# Patient Record
Sex: FEMALE | Race: WHITE | NOT HISPANIC OR LATINO | Employment: OTHER | URBAN - METROPOLITAN AREA
[De-identification: names, ages, dates, MRNs, and addresses within clinical notes are randomized per-mention and may not be internally consistent; named-entity substitution may affect disease eponyms.]

---

## 2017-03-24 ENCOUNTER — HOSPITAL ENCOUNTER (OUTPATIENT)
Dept: MRI IMAGING | Facility: HOSPITAL | Age: 72
Discharge: HOME | End: 2017-03-24
Admitting: INTERNAL MEDICINE

## 2017-03-24 DIAGNOSIS — C50.812 BILATERAL MALIGNANT NEOPLASM OF OVERLAPPING SITES OF BREAST IN FEMALE (HCC): ICD-10-CM

## 2017-03-24 DIAGNOSIS — C50.811 BILATERAL MALIGNANT NEOPLASM OF OVERLAPPING SITES OF BREAST IN FEMALE (HCC): ICD-10-CM

## 2017-03-24 PROCEDURE — 0 GADOBENATE DIMEGLUMINE 529 MG/ML SOLUTION: Performed by: INTERNAL MEDICINE

## 2017-03-24 PROCEDURE — C8908 MRI W/O FOL W/CONT, BREAST,: HCPCS

## 2017-03-24 PROCEDURE — 0159T MRI BREAST BILATERAL W WO CONTRAST: CPT | Performed by: RADIOLOGY

## 2017-03-24 PROCEDURE — A9577 INJ MULTIHANCE: HCPCS | Performed by: INTERNAL MEDICINE

## 2017-03-24 PROCEDURE — 0159T HC MRI BREAST COMPUTER ANALYSIS: CPT

## 2017-03-24 PROCEDURE — 77059 MRI BREAST BILATERAL W WO CONTRAST: CPT | Performed by: RADIOLOGY

## 2017-03-24 RX ADMIN — GADOBENATE DIMEGLUMINE 15 ML: 529 INJECTION, SOLUTION INTRAVENOUS at 13:30

## 2017-03-31 ENCOUNTER — HOSPITAL ENCOUNTER (OUTPATIENT)
Dept: MAMMOGRAPHY | Facility: HOSPITAL | Age: 72
End: 2017-03-31

## 2017-03-31 ENCOUNTER — APPOINTMENT (OUTPATIENT)
Dept: MAMMOGRAPHY | Facility: HOSPITAL | Age: 72
End: 2017-03-31

## 2017-03-31 ENCOUNTER — HOSPITAL ENCOUNTER (OUTPATIENT)
Dept: ULTRASOUND IMAGING | Facility: HOSPITAL | Age: 72
End: 2017-03-31

## 2017-04-05 ENCOUNTER — APPOINTMENT (OUTPATIENT)
Dept: MAMMOGRAPHY | Facility: HOSPITAL | Age: 72
End: 2017-04-05

## 2017-04-05 ENCOUNTER — HOSPITAL ENCOUNTER (OUTPATIENT)
Dept: ULTRASOUND IMAGING | Facility: HOSPITAL | Age: 72
Discharge: HOME | End: 2017-04-05

## 2017-04-05 ENCOUNTER — HOSPITAL ENCOUNTER (OUTPATIENT)
Dept: MAMMOGRAPHY | Facility: HOSPITAL | Age: 72
Discharge: HOME | End: 2017-04-05
Admitting: INTERNAL MEDICINE

## 2017-04-05 ENCOUNTER — HOSPITAL ENCOUNTER (OUTPATIENT)
Dept: MAMMOGRAPHY | Facility: HOSPITAL | Age: 72
Discharge: HOME | End: 2017-04-05

## 2017-04-05 ENCOUNTER — HOSPITAL ENCOUNTER (OUTPATIENT)
Dept: MAMMOGRAPHY | Facility: HOSPITAL | Age: 72
End: 2017-04-05

## 2017-04-05 ENCOUNTER — APPOINTMENT (OUTPATIENT)
Dept: ULTRASOUND IMAGING | Facility: HOSPITAL | Age: 72
End: 2017-04-05

## 2017-04-05 DIAGNOSIS — C50.811 CANCER OF MIDLINE OF BREAST, RIGHT (HCC): ICD-10-CM

## 2017-04-05 DIAGNOSIS — C50.811 BILATERAL MALIGNANT NEOPLASM OF OVERLAPPING SITES OF BREAST IN FEMALE (HCC): ICD-10-CM

## 2017-04-05 DIAGNOSIS — C50.812 BILATERAL MALIGNANT NEOPLASM OF OVERLAPPING SITES OF BREAST IN FEMALE (HCC): ICD-10-CM

## 2017-04-05 PROCEDURE — G0206 DX MAMMO INCL CAD UNI: HCPCS | Performed by: RADIOLOGY

## 2017-04-05 PROCEDURE — 19083 BX BREAST 1ST LESION US IMAG: CPT | Performed by: RADIOLOGY

## 2017-04-05 PROCEDURE — 88305 TISSUE EXAM BY PATHOLOGIST: CPT | Performed by: RADIOLOGY

## 2017-04-05 PROCEDURE — G0279 TOMOSYNTHESIS, MAMMO: HCPCS

## 2017-04-05 PROCEDURE — G0206 DX MAMMO INCL CAD UNI: HCPCS

## 2017-04-05 RX ORDER — LIDOCAINE HYDROCHLORIDE 10 MG/ML
5 INJECTION, SOLUTION INFILTRATION; PERINEURAL ONCE
Status: COMPLETED | OUTPATIENT
Start: 2017-04-05 | End: 2017-04-05

## 2017-04-05 RX ORDER — LIDOCAINE HYDROCHLORIDE AND EPINEPHRINE 10; 10 MG/ML; UG/ML
10 INJECTION, SOLUTION INFILTRATION; PERINEURAL ONCE
Status: COMPLETED | OUTPATIENT
Start: 2017-04-05 | End: 2017-04-05

## 2017-04-05 RX ADMIN — LIDOCAINE HYDROCHLORIDE 5 ML: 10 INJECTION, SOLUTION INFILTRATION; PERINEURAL at 12:37

## 2017-04-05 RX ADMIN — LIDOCAINE HYDROCHLORIDE,EPINEPHRINE BITARTRATE 10 ML: 10; .01 INJECTION, SOLUTION INFILTRATION; PERINEURAL at 12:37

## 2017-04-05 NOTE — PROGRESS NOTES
Alert and orientated. Denies discomfort. No active bleeding. Steri-strips & gauze bandage applied by MENDY CassidyRT . Ice packs given. Verbalizes and demonstrates understanding of post-care instructions and written copy given.

## 2017-04-06 LAB
CYTO UR: NORMAL
LAB AP CASE REPORT: NORMAL
LAB AP CLINICAL INFORMATION: NORMAL
LAB AP DIAGNOSIS COMMENT: NORMAL
Lab: NORMAL
PATH REPORT.FINAL DX SPEC: NORMAL
PATH REPORT.GROSS SPEC: NORMAL

## 2017-04-10 ENCOUNTER — TELEPHONE (OUTPATIENT)
Dept: MAMMOGRAPHY | Facility: HOSPITAL | Age: 72
End: 2017-04-10

## 2017-04-10 NOTE — TELEPHONE ENCOUNTER
04.10.17 @ 1230: Notified pt of recommended follow-up in 6 months. She is aware of appt with Dr Ma in [place] tomorrow (04.11.17 @ [id number]).

## 2020-10-14 ENCOUNTER — HOSPITAL ENCOUNTER (INPATIENT)
Facility: HOSPITAL | Age: 75
LOS: 3 days | Discharge: HOME | End: 2020-10-17
Attending: INTERNAL MEDICINE | Admitting: INTERNAL MEDICINE

## 2020-10-14 ENCOUNTER — APPOINTMENT (OUTPATIENT)
Dept: CT IMAGING | Facility: HOSPITAL | Age: 75
End: 2020-10-14

## 2020-10-14 ENCOUNTER — APPOINTMENT (OUTPATIENT)
Dept: OTHER | Facility: HOSPITAL | Age: 75
End: 2020-10-14

## 2020-10-14 DIAGNOSIS — I63.9 CEREBROVASCULAR ACCIDENT (CVA), UNSPECIFIED MECHANISM (HCC): Primary | ICD-10-CM

## 2020-10-14 DIAGNOSIS — I48.0 PAROXYSMAL ATRIAL FIBRILLATION (HCC): ICD-10-CM

## 2020-10-14 DIAGNOSIS — Z00.6 EXAMINATION FOR NORMAL COMPARISON FOR CLINICAL RESEARCH: ICD-10-CM

## 2020-10-14 LAB
GLUCOSE BLDC GLUCOMTR-MCNC: 115 MG/DL (ref 70–130)
GLUCOSE BLDC GLUCOMTR-MCNC: 127 MG/DL (ref 70–130)

## 2020-10-14 PROCEDURE — 99222 1ST HOSP IP/OBS MODERATE 55: CPT | Performed by: NURSE PRACTITIONER

## 2020-10-14 PROCEDURE — 93005 ELECTROCARDIOGRAM TRACING: CPT | Performed by: STUDENT IN AN ORGANIZED HEALTH CARE EDUCATION/TRAINING PROGRAM

## 2020-10-14 PROCEDURE — 0 IOPAMIDOL PER 1 ML: Performed by: INTERNAL MEDICINE

## 2020-10-14 PROCEDURE — 93010 ELECTROCARDIOGRAM REPORT: CPT | Performed by: INTERNAL MEDICINE

## 2020-10-14 PROCEDURE — 70450 CT HEAD/BRAIN W/O DYE: CPT

## 2020-10-14 PROCEDURE — 0042T HC CT CEREBRAL PERFUSION W/WO CONTRAST: CPT

## 2020-10-14 PROCEDURE — 99222 1ST HOSP IP/OBS MODERATE 55: CPT | Performed by: INTERNAL MEDICINE

## 2020-10-14 PROCEDURE — 82962 GLUCOSE BLOOD TEST: CPT

## 2020-10-14 RX ORDER — LOSARTAN POTASSIUM AND HYDROCHLOROTHIAZIDE 25; 100 MG/1; MG/1
1 TABLET ORAL DAILY
Status: ON HOLD | COMMUNITY
End: 2020-12-17

## 2020-10-14 RX ORDER — MULTIPLE VITAMINS W/ MINERALS TAB 2148-113
1 TAB ORAL DAILY
COMMUNITY

## 2020-10-14 RX ORDER — ASPIRIN 81 MG/1
81 TABLET, CHEWABLE ORAL DAILY
Status: ON HOLD | COMMUNITY
End: 2020-12-17

## 2020-10-14 RX ORDER — ATORVASTATIN CALCIUM 40 MG/1
80 TABLET, FILM COATED ORAL NIGHTLY
Status: DISCONTINUED | OUTPATIENT
Start: 2020-10-14 | End: 2020-10-17 | Stop reason: HOSPADM

## 2020-10-14 RX ORDER — ANASTROZOLE 1 MG/1
1 TABLET ORAL DAILY
COMMUNITY
End: 2023-03-02

## 2020-10-14 RX ORDER — AMLODIPINE BESYLATE 10 MG/1
10 TABLET ORAL DAILY
Status: DISCONTINUED | OUTPATIENT
Start: 2020-10-14 | End: 2020-10-17 | Stop reason: HOSPADM

## 2020-10-14 RX ORDER — ASPIRIN 300 MG/1
300 SUPPOSITORY RECTAL DAILY
Status: DISCONTINUED | OUTPATIENT
Start: 2020-10-14 | End: 2020-10-17 | Stop reason: HOSPADM

## 2020-10-14 RX ORDER — SODIUM CHLORIDE 0.9 % (FLUSH) 0.9 %
10 SYRINGE (ML) INJECTION EVERY 12 HOURS SCHEDULED
Status: DISCONTINUED | OUTPATIENT
Start: 2020-10-14 | End: 2020-10-17

## 2020-10-14 RX ORDER — ASPIRIN 325 MG
325 TABLET ORAL DAILY
Status: DISCONTINUED | OUTPATIENT
Start: 2020-10-14 | End: 2020-10-17 | Stop reason: HOSPADM

## 2020-10-14 RX ORDER — BUSPIRONE HYDROCHLORIDE 5 MG/1
5 TABLET ORAL 2 TIMES DAILY
COMMUNITY

## 2020-10-14 RX ORDER — AMLODIPINE BESYLATE 10 MG/1
10 TABLET ORAL DAILY
COMMUNITY
End: 2022-02-28 | Stop reason: SDUPTHER

## 2020-10-14 RX ORDER — GEMFIBROZIL 600 MG/1
600 TABLET, FILM COATED ORAL
COMMUNITY
End: 2020-11-09

## 2020-10-14 RX ORDER — SODIUM CHLORIDE 0.9 % (FLUSH) 0.9 %
10 SYRINGE (ML) INJECTION AS NEEDED
Status: DISCONTINUED | OUTPATIENT
Start: 2020-10-14 | End: 2020-10-17 | Stop reason: HOSPADM

## 2020-10-14 RX ORDER — CLOPIDOGREL BISULFATE 75 MG/1
75 TABLET ORAL DAILY
Status: DISCONTINUED | OUTPATIENT
Start: 2020-10-14 | End: 2020-10-17 | Stop reason: HOSPADM

## 2020-10-14 RX ORDER — OMEPRAZOLE 40 MG/1
40 CAPSULE, DELAYED RELEASE ORAL DAILY
COMMUNITY
End: 2020-11-09

## 2020-10-14 RX ADMIN — ATORVASTATIN CALCIUM 80 MG: 40 TABLET, FILM COATED ORAL at 22:32

## 2020-10-14 RX ADMIN — AMLODIPINE BESYLATE 10 MG: 10 TABLET ORAL at 18:46

## 2020-10-14 RX ADMIN — IOPAMIDOL 40 ML: 755 INJECTION, SOLUTION INTRAVENOUS at 17:30

## 2020-10-14 RX ADMIN — SODIUM CHLORIDE, PRESERVATIVE FREE 10 ML: 5 INJECTION INTRAVENOUS at 22:32

## 2020-10-14 RX ADMIN — ASPIRIN 325 MG ORAL TABLET 325 MG: 325 PILL ORAL at 17:42

## 2020-10-14 RX ADMIN — CLOPIDOGREL BISULFATE 75 MG: 75 TABLET ORAL at 17:42

## 2020-10-14 NOTE — NURSING NOTE
ACC REVIEW REPORT: [facility]        PATIENT NAME: Yumi Crooks    PATIENT ID: [id number]      COVID-19 ACC SCREENING       DOES THE PATIENT HAVE A FEVER GREATER THAN OR EQUAL .4: DENIES    IS THE PATIENT EXPERIENCING SHORTNESS OF BREATH: DENIES    DOES THE PATIENT HAVE A COUGH: DENIES    DOES THE PATIENT HAVE ANY OF THE FOLLOWING RISK FACTORS:    EXPOSURE TO SUSPECTED OR KNOWN COVID-19: DENIES    RECENT TRAVEL HISTORY TO ENDEMIC AREA (DOMESTIC/LOCAL): DENIES    IS THE PATIENT A HEALTHCARE WORKER: DENIES    HAS THE PATIENT BEEN TESTED FOR COVID-19: YES, PENDING    DATE TESTED: 10/13    LAB TESTING SENT TO: UNKNOWN      BED: S304    BED TYPE: TELE    BED GIVEN TO:     TIME BED GIVEN: 1347    YOB: 1945    AGE: 76YO    GENDER: F    PREVIOUS ADMIT TO [facility]:     PREVIOUS ADMISSION DATE:     PATIENT CLASS: INPATIENT    TODAY'S DATE: 10/14/2020    TRANSFER DATE: 10/14/2020    ETA: 1430    TRANSFERRING FACILITY: [facility]    TRANSFERRING FACILITY PHONE # : [phone]    TRANSFERRING MD: CHI    ACCEPTING PROVIDER: MD POWERS    NEUROLOGY PHYSICIAN:     DATE/TIME REQUEST RECEIVED:     [facility] RN: REBA HINKLE    REPORT FROM: BRANDEN VALVERDE    TIME REPORT TAKEN: 1540    DIAGNOSIS: STROKE    REASON FOR TRANSFER TO [facility]: HIGHER LEVEL OF CARE    TRANSPORTATION: AMBULANCE    CLINICAL REASON FOR TRANSFER TO [facility]: PATIENT HAS HAD INCREASING RIGHT ARM TINGLING, WEAKNESS, AND NUMBNESS FOR THE PAST MONTH. PATIENT BEGAN TO DISPLAY SIGNS OF STROKE LAST Thursday, LAST KNOWN WELL MOST LIKELY 2000 10/13.  PATIENT WAS TRANSPORTED VIA EMS TO ED WITH DIAGNOSIS OF ACUTE STROKE AND UTI.     CLINICAL INFORMATION    HEIGHT: UNKNOWN PER RN    WEIGHT: 172.2LBS    ALLERGIES: SULFA, AMOXICILLIN, OXYCODONE    PIMENTEL: NONE    INFECTIOUS DISEASE: NONE    ISOLATION: NONE    LAST VITAL SIGNS:  TIME: 1200  TEMP: 98.4  PULSE: 60   B/P: 153/83  RESP: 16    LAB INFORMATION: 10/13 2300 - WBC 6.0, HBG 14.0, HCT 41.3, , MG 2.1, , K 3.8,  CREATININE 1.0, GLUCOSE 112     CULTURE INFORMATION: NONE    MEDS/IV FLUIDS: 20' LEFT AC, SALINE LOCKED      CARDIAC SYSTEM:    CHEST PAIN: NONE    RATE: NONE    SCALE: NONE    RHYTHM: SINUS BRADYCARDIA TO NORMAL SINUS RHYTHM    Is patient taking or has patient been given any drugs that could increase bleeding? YES  (Plavix, Brilinta, Effient, Eliquis, Xarelto, Warfarin, Integrilin, Angiomax)    DRUG: PLAVIX, ELIQUIS     DOSE/FREQUENCY: 75MG PLAVIX DAILY, 5 MG ELIQUIS DAILY    CARDIAC ENZYMES: NOT DONE    CARDIAC NOTES: 10/13/20 EKG NORMAL SINUS RHYTHM.      RESPIRATORY SYSTEM:    LUNG SOUNDS: PRESENT    CLEAR: YES    OXYGEN: ROOM AIR    O2 SAT: 97%    RESPIRATORY STATUS: PER RN, PATIENT IS NOT LABORED, O2 SAT GREATER THAN 95%.    CNS/MUSCULOSKELETAL      MARCEL COMA SCALE:    E: 4  M: 6  V: 5    LAST KNOWN WELL: 10/14/20      NIHSS    Survey Item  0: Means Alert  1: Drowsy or Answer Correctly  2: Incorrect, Forced, Can't Resist Gravity  3: Complete or No Effort  4: No Movement  NT: Not Testable Acceptable As Noted Above    1A: Level of Consciousness: 0    1B: LOC Questions (month, age) : 0    1C: LOC Commands (open/close eyes, make a fist & let go): 0    2:  Best Gaze (eyes open-pt follows examiner's fingers or face): 0    3:  Visual (introduce visual stimulus/threat to pt's visual field quad. Cover 1 eye and hold up fingers in all 4 quadrants) : 0    4.  Facial Palsy (show teeth, raise eyebrows and squeeze eyes tightly shut): 0    5A: Motor Arm-Left (elevate extremity to 90 degrees and score drift/movement.  Count to 10 aloud and use fingers for visual cue): 0    5B:  Motor Arm-Right (elevate extremity to 90 degrees and score drift/movement.  Count to 10 aloud and use fingers for visual cue): 0    6A:  Motor Leg-Left (elevate extremity to 30 degrees and score drift/movement.  Count to 5 out loud and use fingers for visual cue): 0    6B:  Motor Leg-Right (elevate extremity to 30 degrees and score drift/movement.   Count to 5 out loud and use fingers for visual cue): 0    7:  Limb Ataxia- finger to nose, heel down shin: 0    8:  Sensory- pin prick to face, arms, trunk, and legs. Compare sharpness side to side: 0    9:  Best Language- name, items, describe picture, and read sentences.  Do not forget glasses if they normally wear them: 0    10: Dysarthria- elevate speech clarity by pt reading or repeating words on a list: 0    11: Extinction and Inattention- Use information from prior testing or double simultaneous stimuli testing to identify neglect. Face, arms, legs and visual field: 0    Total NIHSS Score: 0  Date: 10/14/2020   Time of NIHSS Assessment: 0800  PER RN, PATIENT DOES NOT DISPLAY ANY DEFICITS. RN IS UNABLE TO ACCESS PREVIOUS NIHSS ASSESSMENT.      SIZE OF HEMORRHAGE: UNKNOWN PER RN    CAT SCAN RESULTS: 10/14 CT OF HEAD SHOWS MULTIPLE MINOR BLOCKAGES, MULTIPLE ACUTE INFARCTS, 1 LARGE CEREBRAL POSTERIOR OCCLUSION OF CONCERN (LEFT POSTERIOR CEREBELLAR ARTERY).    MRI RESULTS: 10/14 PENDING, RESULTS UNKNOWN PER RN    CNS/MUSCULOSKELETAL NOTES:  10/14/20 BILATERAL LOWER EXTREMITY DOPPLER SCAN, RESULTS PENDING. 10/13/20 CTA HEAD AND NECK, RESULTS PENDING. RESULTS UNKNOWN PER RN.    GI//GY    ABDOMINAL PAIN: NONE    VOMITING: NONE    DIARRHEA: 10/14/20 - LIQUID STOOL, MOST LIKELY DUE TO CONTRAST    NAUSEA: NONE    BOWEL SOUNDS: PRESENT    OCCULT STOOL: NONE    VAGINAL BLEEDING: NONE    HEMATURIA: NONE    NG TUBE:    SIZE:   DATE INSERTED:       ULTRASOUND:     ULTRASOUND RESULTS:       ACUTE ABDOMEN:     ACUTE ABDOMEN RESULTS:       CT SCAN:     CT SCAN RESULTS:       GI//GY NOTES:     PAST MEDICAL HISTORY: HTN, HIGH CHOLESTEROL, MINI STROKES, GERD, BREAST CANCER    OTHER SYMPTOM NOTES: PATIENT COMPLAINTS OF BILATERAL LOWER EXTREMITY TINGLING 10/14 AM, RESULTING IN DUPLEX SCAN, PATIENT HAS HAD NO COMPLAINTS SINCE THEN.    ADDITIONAL NOTES: PER RN, PATIENT IS UP AD YARIEL, VITALS ARE STABLE.     PER DR. MIRELES DURING  CONVERSATION WITH STROKE NAVIGATOR, PATIENT HAS RIGHT SIDED WEAKNESS, MINOR HEMINOPSIA OF THE RIGHT EYE, AND SOME APHASIA (NIHSS 5).      Jamila Bishop RN  10/14/2020  13:52 EDT

## 2020-10-14 NOTE — CONSULTS
"Stroke Consult Note    Patient Name: Yumi Crooks   MRN: [id number]  Age: 75 y.o.  Sex: female  : 1945    Primary Care Physician: Kya Huerta MD  Referring Physician:  Leonila Osborn MD    TIME STROKE TEAM CALLED: 1557 EST     TIME PATIENT SEEN: 1605 EST    Handedness: Right  Race: white    Chief Complaint/Reason for Consultation: right arm numbness    HPI: Mrs Crooks is a 75 year old white, right handed female with known diagnosis of breast cancer (s/p mastectomy, currently on arimidex),HTN, HLD and anxiety that started having right arm numbness approximately one month ago.  She has been going to the chiropractor because she thought it was \"pinched nerve\". Has also had intermittent posterior headache, 2/10, with associated nausea and blurred vision. Has been to her opthamologist and got new glasses which did not help blurred vision.     Right arm numbness worsened in the past week prompting her to go to OSH for evaluation yesterday.  CTA at their facility revealed and reported occluded right vertebral artery and left PCA occlusion, thus she was transferred to our facility for further evaluation.     She is on ASA 81mg daily at home. Initial /104    Last Known Normal Date/Time: one month ago     Review of Systems   Constitutional: Negative for fever.   HENT: Negative for trouble swallowing.    Eyes: Negative for photophobia and visual disturbance.   Respiratory: Negative for cough and shortness of breath.    Cardiovascular: Negative for chest pain and palpitations.   Gastrointestinal: Positive for nausea. Negative for vomiting.   Endocrine: Positive for cold intolerance and heat intolerance.   Genitourinary: Positive for frequency.   Musculoskeletal: Positive for arthralgias.   Skin: Negative.    Allergic/Immunologic: Negative.    Neurological: Positive for dizziness, numbness and headaches. Negative for tremors, seizures, syncope, facial asymmetry, speech difficulty, weakness and " light-headedness.   Hematological: Negative.    Psychiatric/Behavioral: Negative.         Temp:  [97.8 °F (36.6 °C)] 97.8 °F (36.6 °C)  Heart Rate:  [64] 64  Resp:  [18] 18  BP: (195)/(104) 195/104    Neurological Exam  Mental Status  Awake, alert and oriented to person, place and time.Alert. Recent and remote memory are intact. Speech is normal. Language is fluent with no aphasia. Attention and concentration are normal. Fund of knowledge is appropriate for level of education.    Cranial Nerves  CN II: Left visual acuity: normal. Right homonymous hemianopsia.  CN III, IV, VI: Extraocular movements intact bilaterally.  CN V: Facial sensation is normal.  CN VII: Full and symmetric facial movement.  CN IX, X: Palate elevates symmetrically  CN XI: Shoulder shrug strength is normal.  CN XII: Tongue midline without atrophy or fasciculations.    Motor  Normal muscle bulk throughout. No fasciculations present. Strength is 5/5 throughout all four extremities.    Sensory  Light touch is normal in upper and lower extremities.     Coordination  Finger-to-nose, rapid alternating movements and heel-to-shin normal bilaterally without dysmetria.    Gait  Normal casual, toe, heel and tandem gait.      Physical Exam  Vitals signs and nursing note reviewed.   Constitutional:       Appearance: Normal appearance.   HENT:      Head: Normocephalic and atraumatic.      Mouth/Throat:      Mouth: Mucous membranes are moist.   Eyes:      Extraocular Movements: Extraocular movements intact.   Cardiovascular:      Rate and Rhythm: Normal rate and regular rhythm.   Abdominal:      General: Abdomen is flat.   Skin:     General: Skin is warm and dry.   Neurological:      Mental Status: She is alert.      Coordination: Coordination is intact.      Deep Tendon Reflexes: Strength normal.   Psychiatric:         Mood and Affect: Mood normal.         Speech: Speech normal.         Behavior: Behavior normal.         Acute Stroke Data    Alteplase (tPA)  Inclusion / Exclusion Criteria    Time: 1600  Person Administering Scale: DIONY Sofia    Inclusion Criteria  [x]   18 years of age or greater   []   Onset of symptoms < 4.5 hours before beginning treatment (stroke onset = time patient was last seen well or without symptoms).   []   Diagnosis of acute ischemic stroke causing measurable disabling deficit (Complete Hemianopia, Any Aphasia, Visual or Sensory Extinction, Any weakness limiting sustained effort against gravity)   []   Any remaining deficit considered potentially disabling in view of patient and practitioner   Exclusion criteria (Do not proceed with Alteplase if any are checked under exclusion criteria)  [x]   Onset unknown or GREATER than 4.5 hours   []   ICH on CT/MRI   []   CT demonstrates hypodensity representing acute or subacute infarct   []   Significant head trauma or prior stroke in the previous 3 months   []   Symptoms suggestive of subarachnoid hemorrhage   []   History of un-ruptured intracranial aneurysm GREATER than 10 mm   []   Recent intracranial or intraspinal surgery within the last 3 months   []   Arterial puncture at a non-compressible site in the previous 7 days   []   Active internal bleeding   []   Acute bleeding tendency   []   Platelet count LESS than 100,000 for known hematological diseases such as leukemia, thrombocytopenia or chronic cirrhosis   []   Current use of anticoagulant with INR GREATER than 1.7 or PT GREATER than 15 seconds, aPTT GREATER than 40 seconds   []   Heparin received within 48 hours, resulting in abnormally elevated aPTT GREATER than upper limit of normal   []   Current use of direct thrombin inhibitors or direct factor Xa inhibitors in the past 48 hours   []   Elevated blood pressure refractory to treatment (systolic GREATER than 185 mm/Hg or diastolic  GREATER than 110 mm/Hg   []   Suspected infective endocarditis and aortic arch dissection   []   Current use of therapeutic treatment dose of  low-molecular-weight heparin (LMWH) within the previous 24 hours   []   Structural GI malignancy or bleed   Relative exclusion for all patients  []   Only minor non-disabling symptoms   []   Pregnancy   []   Seizure at onset with postictal residual neurological impairments   []   Major surgery or previous trauma within past 14 days   []   History of previous spontaneous ICH, intracranial neoplasm, or AV malformation   []   Postpartum (within previous 14 days)   []   Recent GI or urinary tract hemorrhage (within previous 21 days)   []   Recent acute MI (within previous 3 months)   []   History of un-ruptured intracranial aneurysm LESS than 10 mm   []   History of ruptured intracranial aneurysm   []   Blood glucose LESS than 50 mg/dL (2.7 mmol/L)   []   Dural puncture within the last 7 days   []   Known GREATER than 10 cerebral microbleeds   Additional exclusions for patients with symptoms onset between 3 and 4.5 hours.  []   Age > 80.   []   On any anticoagulants regardless of INR  >>> Warfarin (Coumadin), Heparin, Enoxaparin (Lovenox), fondaparinux (Arixtra), bivalirudin (Angiomax), Argatroban, dabigatran (Pradaxa), rivaroxaban (Xarelto), or apixaban (Eliquis)   []   Severe stroke (NIHSS > 25).   []   History of BOTH diabetes and previous ischemic stroke.   []   The risks and benefits have been discussed with the patient or family related to the administration of IV Alteplase for stroke symptoms.   []   I have discussed and reviewed the patient's case and imaging with the attending prior to IV Alteplase.   Not administered Time Alteplase administered       History reviewed. No pertinent past medical history.  Past Surgical History:   Procedure Laterality Date   • BREAST BIOPSY Right     2015     Family History   Problem Relation Age of Onset   • Breast cancer Neg Hx    • Ovarian cancer Neg Hx      Social History     Socioeconomic History   • Marital status:      Spouse name: Not on file   • Number of  children: Not on file   • Years of education: Not on file   • Highest education level: Not on file   Tobacco Use   • Smoking status: Never Smoker   • Smokeless tobacco: Never Used     No Known Allergies  Prior to Admission medications    Not on File       Hospital Meds:  Scheduled- [COMPLETED] iopamidol, 50 mL, Intravenous, Once in imaging      Infusions-     PRNs-     Functional Status Prior to Current Stroke/[place] Score: 0    NIH Stroke Scale  Time: 1600  Person Administering Scale: DIONY Sofia    1a  Level of consciousness: 0=alert; keenly responsive   1b. LOC questions:  0=Performs both tasks correctly   1c. LOC commands: 0=Performs both tasks correctly   2.  Best Gaze: 0=normal   3.  Visual: 1=Partial hemianopia   4. Facial Palsy: 0=Normal symmetric movement   5a.  Motor left arm: 0=No drift, limb holds 90 (or 45) degrees for full 10 seconds   5b.  Motor right arm: 0=No drift, limb holds 90 (or 45) degrees for full 10 seconds   6a. motor left le=No drift, limb holds 90 (or 45) degrees for full 10 seconds   6b  Motor right le=No drift, limb holds 90 (or 45) degrees for full 10 seconds   7. Limb Ataxia: 0=Absent   8.  Sensory: 0=Normal; no sensory loss   9. Best Language:  0=No aphasia, normal   10. Dysarthria: 0=Normal   11. Extinction and Inattention: 0=No abnormality    Total:   1       Results Reviewed:  I have personally reviewed current lab, radiology, and data and agree with results.  Lab Results (last 24 hours)     ** No results found for the last 24 hours. **        Imaging Results (Last 24 Hours)     Procedure Component Value Units Date/Time    CT Cerebral Perfusion With & Without Contrast [[id number]] Collected: 10/14/20 1643     Updated: 10/14/20 1654    Narrative:      EXAMINATION: CT CEREBRAL PERFUSION W WO CONTRAST-10/14/2020:      INDICATION: Transient ischemic attack (TIA); Z00.6-Encounter for  examination for normal comparison and control in clinical research  program.      TECHNIQUE: Cerebral perfusion analysis was performed using computed  tomography with IV contrast administration, including postprocessing of  parametric maps with determination of cerebral blood flow, cerebral  blood volume, mean transit time and time to drain.     The radiation dose reduction device was turned on for each scan per the  ALARA (As Low as Reasonably Achievable) protocol.     COMPARISON: Unenhanced CT scan of same day.     FINDINGS: Rapid analysis shows no areas of the brain with cerebral blood  flow less than 30% or T-Max greater than six seconds. Individual  perfusion maps, however, show a roughly 7 x 3 cm area of increased time  to drain, mean transit time and T-Max in the left medial parietal lobe,  perhaps extending into the occipital lobe. There is mild, if any  asymmetry on cerebral blood flow images, and cerebral blood volume  images appear appear essentially normal. This suggests possibility of  left posterior cerebral artery territory slow flow, without apparent  ischemia or infarct.       Impression:      1. Negative rapid study for significant ischemia or infarct.  2. Individual perfusion maps suggest slow flow centered in the medial  left parietal lobe, perhaps in the posterior left MCA/PCA watershed  territory.     D:  10/14/2020  E:  10/14/2020             CT Head Without Contrast [[id number]] Collected: 10/14/20 1636     Updated: 10/14/20 1644    Narrative:      EXAMINATION: CT HEAD WO CONTRAST-      INDICATION: stroke; Z00.6-Encounter for examination for normal  comparison and control in clinical research program     TECHNIQUE: 5 mm unenhanced images through the brain     The radiation dose reduction device was turned on for each scan per the  ALARA (As Low as Reasonably Achievable) protocol.     COMPARISON: NONE     FINDINGS: Patient history indicates stroke, right arm numbness.     The calvarium appears intact. Included paranasal sinuses and mastoids  appear clear. Soft tissue window  images show expected degree of  generalized cervical atrophy for age. There is extensive central white  matter change, typical of microvascular leukoencephalopathy.  Low-attenuation changes in both right and left basal ganglia may  represent chronic ischemic foci. There is a sharply defined CSF density  2.2 cm lesion of the medial left temporal lobe is in the usual location  of a normal variant dilated perivascular space, although larger than  typically seen, possibly an arachnoid cyst, but likely benign in any  event. No similar findings are identified elsewhere. There is no  apparent well-defined edema/infarct, no evidence of hemorrhage,  hydrocephalus, soft tissue mass or significant mass effect, or abnormal  extra-axial collection.                   Impression:      1. Extensive central white matter changes, likely representing chronic  small vessel disease. Punctate low-density changes of the right and left  basal ganglia, favored to represent small old ischemic lesions.  2. Probable 2.2 cm arachnoid cyst of the medial left temporal lobe, as  noted above.  3. No evidence of intracranial hemorrhage, well-defined infarct, or  other clearly acute intracranial abnormality.              Note: Exam time is shown as 4:23 PM. Study was reviewed dictated and  signed at 4:40 PM.     This report was finalized on 10/14/2020 4:41 PM by Dr. Nabeel Kendrick MD.       MRI Outside Films [[id number]] Resulted: 10/14/20 1632     Updated: 10/14/20 1632    Narrative:      This procedure was auto-finalized with no dictation required.    CT Outside Films [[id number]] Resulted: 10/14/20 1630     Updated: 10/14/20 1630    Narrative:      This procedure was auto-finalized with no dictation required.    CT Outside Films [[id number]] Resulted: 10/14/20 1629     Updated: 10/14/20 1629    Narrative:      This procedure was auto-finalized with no dictation required.             Assessment/Plan:   Mrs Crooks is a 75 year old white, right handed  "female with known diagnosis of breast cancer (s/p mastectomy, currently on arimidex),HTN, HLD and anxiety that started having right arm numbness approximately one month ago.  She has been going to the chiropractor because she thought it was \"pinched nerve\". Has also had intermittent posterior headache, 2/10, with associated nausea and blurred vision. Has been to her opthamologist and got new glasses which did not help blurred vision.     Right arm numbness worsened in the past week prompting her to go to OSH for evaluation yesterday.  CTA at their facility reported occluded right vertebral artery and left PCA occlusion, and MRI revealed multiple small infarcts of left splenium of corpus collosum and medial left occipital, thus she was transferred to our facility for further evaluation.     She is on ASA 81mg daily at home. Initial /104          1. Acute CVA  -CT head negative for acute changes  -CTA head from OSH reviewed left PCA occlusion, right vertebral occlusion  -MRI from OSH reviewed multiple small infarcts left splenium of corpus collosum and medial left occipital infarct  -Initiate TIA/AIS order set  -Echo performed at OSH, will not repeat at this time  -ASA 325mg daily  -Plavix 75mg daily  -Lipitor 80mg qhs  -a1c in am  -lipid panel in am    2.  HTN  -slowly normalize BP  -will start only amlodipine today, hold Hyzar    3.  HLD  -lipid panel in am  -start Lipitor 80mg qhs    Thank you for the consult. Case discussed with Dr Jim and imaging reviewed. Also discussed with patient and nursing staff.            Shanna Mcconnell, APRN  October 14, 2020  17:22 EDT      "

## 2020-10-15 ENCOUNTER — APPOINTMENT (OUTPATIENT)
Dept: OTHER | Facility: HOSPITAL | Age: 75
End: 2020-10-15

## 2020-10-15 PROBLEM — E78.5 HLD (HYPERLIPIDEMIA): Status: ACTIVE | Noted: 2020-10-15

## 2020-10-15 PROBLEM — I10 HTN (HYPERTENSION): Status: ACTIVE | Noted: 2020-10-15

## 2020-10-15 LAB
ALBUMIN SERPL-MCNC: 4.1 G/DL (ref 3.5–5.2)
ALBUMIN/GLOB SERPL: 1.6 G/DL
ALP SERPL-CCNC: 112 U/L (ref 39–117)
ALT SERPL W P-5'-P-CCNC: 21 U/L (ref 1–33)
ANION GAP SERPL CALCULATED.3IONS-SCNC: 12 MMOL/L (ref 5–15)
AST SERPL-CCNC: 25 U/L (ref 1–32)
BASOPHILS # BLD AUTO: 0.09 10*3/MM3 (ref 0–0.2)
BASOPHILS NFR BLD AUTO: 1.1 % (ref 0–1.5)
BILIRUB SERPL-MCNC: 0.2 MG/DL (ref 0–1.2)
BUN SERPL-MCNC: 21 MG/DL (ref 8–23)
BUN/CREAT SERPL: 23.9 (ref 7–25)
CALCIUM SPEC-SCNC: 9.6 MG/DL (ref 8.6–10.5)
CHLORIDE SERPL-SCNC: 108 MMOL/L (ref 98–107)
CHOLEST SERPL-MCNC: 203 MG/DL (ref 0–200)
CO2 SERPL-SCNC: 21 MMOL/L (ref 22–29)
CREAT SERPL-MCNC: 0.88 MG/DL (ref 0.57–1)
DEPRECATED RDW RBC AUTO: 44.2 FL (ref 37–54)
EOSINOPHIL # BLD AUTO: 0.33 10*3/MM3 (ref 0–0.4)
EOSINOPHIL NFR BLD AUTO: 4.1 % (ref 0.3–6.2)
ERYTHROCYTE [DISTWIDTH] IN BLOOD BY AUTOMATED COUNT: 12.3 % (ref 12.3–15.4)
GFR SERPL CREATININE-BSD FRML MDRD: 63 ML/MIN/1.73
GLOBULIN UR ELPH-MCNC: 2.5 GM/DL
GLUCOSE BLDC GLUCOMTR-MCNC: 112 MG/DL (ref 70–130)
GLUCOSE BLDC GLUCOMTR-MCNC: 168 MG/DL (ref 70–130)
GLUCOSE SERPL-MCNC: 106 MG/DL (ref 65–99)
HBA1C MFR BLD: 5.7 % (ref 4.8–5.6)
HCT VFR BLD AUTO: 40.3 % (ref 34–46.6)
HDLC SERPL-MCNC: 30 MG/DL (ref 40–60)
HGB BLD-MCNC: 13.3 G/DL (ref 12–15.9)
IMM GRANULOCYTES # BLD AUTO: 0.02 10*3/MM3 (ref 0–0.05)
IMM GRANULOCYTES NFR BLD AUTO: 0.2 % (ref 0–0.5)
INR PPP: 1.09 (ref 0.85–1.16)
LDLC SERPL CALC-MCNC: 143 MG/DL (ref 0–100)
LDLC/HDLC SERPL: 4.68 {RATIO}
LYMPHOCYTES # BLD AUTO: 3.1 10*3/MM3 (ref 0.7–3.1)
LYMPHOCYTES NFR BLD AUTO: 38.4 % (ref 19.6–45.3)
MCH RBC QN AUTO: 32.1 PG (ref 26.6–33)
MCHC RBC AUTO-ENTMCNC: 33 G/DL (ref 31.5–35.7)
MCV RBC AUTO: 97.3 FL (ref 79–97)
MONOCYTES # BLD AUTO: 0.68 10*3/MM3 (ref 0.1–0.9)
MONOCYTES NFR BLD AUTO: 8.4 % (ref 5–12)
NEUTROPHILS NFR BLD AUTO: 3.85 10*3/MM3 (ref 1.7–7)
NEUTROPHILS NFR BLD AUTO: 47.8 % (ref 42.7–76)
NRBC BLD AUTO-RTO: 0 /100 WBC (ref 0–0.2)
PLATELET # BLD AUTO: 267 10*3/MM3 (ref 140–450)
PMV BLD AUTO: 10.9 FL (ref 6–12)
POTASSIUM SERPL-SCNC: 3.8 MMOL/L (ref 3.5–5.2)
PROT SERPL-MCNC: 6.6 G/DL (ref 6–8.5)
PROTHROMBIN TIME: 13.9 SECONDS (ref 11.5–14)
RBC # BLD AUTO: 4.14 10*6/MM3 (ref 3.77–5.28)
SARS-COV-2 RDRP RESP QL NAA+PROBE: NOT DETECTED
SODIUM SERPL-SCNC: 141 MMOL/L (ref 136–145)
TRIGL SERPL-MCNC: 163 MG/DL (ref 0–150)
VLDLC SERPL-MCNC: 30 MG/DL (ref 5–40)
WBC # BLD AUTO: 8.07 10*3/MM3 (ref 3.4–10.8)

## 2020-10-15 PROCEDURE — 80061 LIPID PANEL: CPT | Performed by: STUDENT IN AN ORGANIZED HEALTH CARE EDUCATION/TRAINING PROGRAM

## 2020-10-15 PROCEDURE — 92523 SPEECH SOUND LANG COMPREHEN: CPT

## 2020-10-15 PROCEDURE — 97165 OT EVAL LOW COMPLEX 30 MIN: CPT

## 2020-10-15 PROCEDURE — 82962 GLUCOSE BLOOD TEST: CPT

## 2020-10-15 PROCEDURE — 97161 PT EVAL LOW COMPLEX 20 MIN: CPT

## 2020-10-15 PROCEDURE — 85025 COMPLETE CBC W/AUTO DIFF WBC: CPT | Performed by: INTERNAL MEDICINE

## 2020-10-15 PROCEDURE — 80053 COMPREHEN METABOLIC PANEL: CPT | Performed by: INTERNAL MEDICINE

## 2020-10-15 PROCEDURE — 85610 PROTHROMBIN TIME: CPT | Performed by: INTERNAL MEDICINE

## 2020-10-15 PROCEDURE — 83036 HEMOGLOBIN GLYCOSYLATED A1C: CPT | Performed by: STUDENT IN AN ORGANIZED HEALTH CARE EDUCATION/TRAINING PROGRAM

## 2020-10-15 PROCEDURE — 99233 SBSQ HOSP IP/OBS HIGH 50: CPT | Performed by: STUDENT IN AN ORGANIZED HEALTH CARE EDUCATION/TRAINING PROGRAM

## 2020-10-15 PROCEDURE — 87635 SARS-COV-2 COVID-19 AMP PRB: CPT | Performed by: INTERNAL MEDICINE

## 2020-10-15 RX ORDER — LOSARTAN POTASSIUM 50 MG/1
100 TABLET ORAL
Status: DISCONTINUED | OUTPATIENT
Start: 2020-10-15 | End: 2020-10-17 | Stop reason: HOSPADM

## 2020-10-15 RX ORDER — SODIUM CHLORIDE 9 MG/ML
75 INJECTION, SOLUTION INTRAVENOUS CONTINUOUS
Status: DISCONTINUED | OUTPATIENT
Start: 2020-10-15 | End: 2020-10-16

## 2020-10-15 RX ORDER — BUSPIRONE HYDROCHLORIDE 10 MG/1
5 TABLET ORAL DAILY
Status: DISCONTINUED | OUTPATIENT
Start: 2020-10-15 | End: 2020-10-17 | Stop reason: HOSPADM

## 2020-10-15 RX ORDER — SODIUM CHLORIDE 0.9 % (FLUSH) 0.9 %
10 SYRINGE (ML) INJECTION EVERY 12 HOURS SCHEDULED
Status: DISCONTINUED | OUTPATIENT
Start: 2020-10-15 | End: 2020-10-17 | Stop reason: HOSPADM

## 2020-10-15 RX ORDER — PANTOPRAZOLE SODIUM 40 MG/1
40 TABLET, DELAYED RELEASE ORAL EVERY MORNING
Status: DISCONTINUED | OUTPATIENT
Start: 2020-10-15 | End: 2020-10-17 | Stop reason: HOSPADM

## 2020-10-15 RX ORDER — SODIUM CHLORIDE 0.9 % (FLUSH) 0.9 %
10 SYRINGE (ML) INJECTION AS NEEDED
Status: DISCONTINUED | OUTPATIENT
Start: 2020-10-15 | End: 2020-10-17 | Stop reason: HOSPADM

## 2020-10-15 RX ORDER — ANASTROZOLE 1 MG/1
1 TABLET ORAL DAILY
Status: DISCONTINUED | OUTPATIENT
Start: 2020-10-15 | End: 2020-10-17 | Stop reason: HOSPADM

## 2020-10-15 RX ADMIN — PANTOPRAZOLE SODIUM 40 MG: 40 TABLET, DELAYED RELEASE ORAL at 06:48

## 2020-10-15 RX ADMIN — AMLODIPINE BESYLATE 10 MG: 10 TABLET ORAL at 09:55

## 2020-10-15 RX ADMIN — ANASTROZOLE 1 MG: 1 TABLET, COATED ORAL at 09:56

## 2020-10-15 RX ADMIN — ATORVASTATIN CALCIUM 80 MG: 40 TABLET, FILM COATED ORAL at 22:41

## 2020-10-15 RX ADMIN — SODIUM CHLORIDE 500 ML: 9 INJECTION, SOLUTION INTRAVENOUS at 17:16

## 2020-10-15 RX ADMIN — BUSPIRONE HYDROCHLORIDE 5 MG: 10 TABLET ORAL at 09:55

## 2020-10-15 RX ADMIN — CLOPIDOGREL BISULFATE 75 MG: 75 TABLET ORAL at 09:56

## 2020-10-15 RX ADMIN — ASPIRIN 325 MG ORAL TABLET 325 MG: 325 PILL ORAL at 09:55

## 2020-10-15 RX ADMIN — LOSARTAN POTASSIUM 100 MG: 50 TABLET, FILM COATED ORAL at 09:55

## 2020-10-15 RX ADMIN — SODIUM CHLORIDE, PRESERVATIVE FREE 10 ML: 5 INJECTION INTRAVENOUS at 22:41

## 2020-10-15 RX ADMIN — SODIUM CHLORIDE, PRESERVATIVE FREE 10 ML: 5 INJECTION INTRAVENOUS at 22:42

## 2020-10-15 RX ADMIN — SODIUM CHLORIDE 75 ML/HR: 9 INJECTION, SOLUTION INTRAVENOUS at 17:16

## 2020-10-15 NOTE — PLAN OF CARE
Goal Outcome Evaluation:  Pt. A&Ox4, VSS, NS-SB per monitor. RA while awake, 2L NC while sleeping to keep O2 saturation >90%. No c/o pain, no c/o N/V/D. NIH 0. Will continue to monitor.

## 2020-10-15 NOTE — PROGRESS NOTES
Discharge Planning Assessment  [facility]     Patient Name: Yumi Crooks  MRN: [id number]  Today's Date: 10/15/2020    Admit Date: 10/14/2020    Discharge Needs Assessment     Row Name 10/15/20 1515       Living Environment    Lives With  spouse    Name(s) of Who Lives With Patient  Linus    Current Living Arrangements  home/apartment/condo    Primary Care Provided by  self    Provides Primary Care For  no one    Family Caregiver if Needed  spouse;child(jovani), adult    Family Caregiver Names  Sofia and spouse    Quality of Family Relationships  supportive    Able to Return to Prior Arrangements  yes       Resource/Environmental Concerns    Resource/Environmental Concerns  none    Transportation Concerns  car, none       Transition Planning    Patient/Family Anticipates Transition to  home with family    Patient/Family Anticipated Services at Transition  rehabilitation services    Transportation Anticipated  family or friend will provide       Discharge Needs Assessment    Readmission Within the Last 30 Days  no previous admission in last 30 days    Equipment Currently Used at Home  -- has access to cane, walker and rollator but she does not use for her self    Concerns to be Addressed  discharge planning    Anticipated Changes Related to Illness  none        Discharge Plan     Row Name 10/15/20 6747       Plan    Plan  HOme with family    Plan Comments  I spoke with patient who was up in room with her daughter Sofia. She tells me she is independent with ADL's at home, using no DME for herself. Rehab has seen and no further recommendations with this. Plans home with family.    Final Discharge Disposition Code  01 - home or self-care        Continued Care and Services - Admitted Since 10/14/2020    Coordination has not been started for this encounter.       Expected Discharge Date and Time     Expected Discharge Date Expected Discharge Time    Oct 18, 2020         Demographic Summary     Row Name 10/15/20 1519        General Information    Admission Type  inpatient    Referral Source  admission list    Preferred Language  English    General Information Comments  PCP Sofia Oquendo       Contact Information    Permission Granted to Share Info With  ;family/designee Sofia daughter        Functional Status     Row Name 10/15/20 1515       Functional Status    Usual Activity Tolerance  excellent       Functional Status, IADL    Medications  independent    Meal Preparation  independent    Housekeeping  independent    Laundry  independent    Shopping  independent       Employment/    Employment/ Comments  Patient has [employer] Medicare insurance and denies concerns regarding coverage or disruption in coverage issues. Patient has drug coverage and denies issues obtaining or affording current medications. .        Psychosocial    No documentation.       Abuse/Neglect    No documentation.       Legal    No documentation.       Substance Abuse    No documentation.       Patient Forms    No documentation.           Kallie Rasmussen RN

## 2020-10-15 NOTE — CONSULTS
Patient does not meet diabetes education order criteria, therefore patient was not seen for diabetes education at this time. Current A1C is 5.7%. No hx No DM meds.  Please re consult as needed.

## 2020-10-15 NOTE — H&P
"    [facility] Medicine Services  HISTORY AND PHYSICAL    Patient Name: Yumi Crooks  : 1945  MRN: [id number]  Primary Care Physician: Sofia Oquendo APRN  Date of admission: 10/14/2020      Subjective   Subjective     Chief Complaint:  Transfer for stroke evaluation    HPI:  Yumi Crooks is a 75 y.o. female with history of breast cancer on anastrozole, HTN, dyslipidemia who was transferred from outside hospital for stroke evaluation.  She reports approximately 1 month of right upper extremity numbness and right perioral numbness.  She thought she had pinched nerve and had seen her chiropractor to have her self \"adjusted\" but did not improve.  During this time she did develop blurring of her vision and generalized symmetrical weakness of the lower extremities stating \"my legs just could not carry me.\" Since her symptoms persisted she presented to the outside hospital where she had imaging initially reported is concerning for acute posterior circulation stroke and was transferred to our facility for further evaluation.  At this time she reports persistent numbness is noted, denies any weakness of the extremities, slurring of speech.  She denies fever, shortness of breath, change in taste or smell      Current COVID Risks are:  [] Fever []  Cough [] Shortness of breath [] Fatigue [] Change in taste or smell    [] Exposure to COVID positive patient  [] High risk facility   [x]  NONE    Review of Systems   Gen- No fevers, chills  CV- No chest pain, palpitations  Resp- No cough, dyspnea  GI- No N/V/D, abd pain    All other systems reviewed and are negative.     Personal History     Past Medical History:   Diagnosis Date   • Dyslipidemia    • Hypertension        Past Surgical History:   Procedure Laterality Date   • BREAST BIOPSY Right            Family History: family history is not on file. Otherwise pertinent FHx was reviewed and unremarkable.     Social History:  reports that " she has never smoked. She has never used smokeless tobacco.  Social History     Social History Narrative   • Not on file       Medications:  Available home medication information reviewed.  Medications Prior to Admission   Medication Sig Dispense Refill Last Dose   • amLODIPine (NORVASC) 10 MG tablet Take 10 mg by mouth Daily.      • anastrozole (ARIMIDEX) 1 MG tablet Take 1 mg by mouth Daily.      • aspirin 81 MG chewable tablet Chew 81 mg Daily.      • busPIRone (BUSPAR) 5 MG tablet Take 5 mg by mouth Daily.      • gemfibrozil (LOPID) 600 MG tablet Take 600 mg by mouth 2 (Two) Times a Day Before Meals.      • losartan-hydrochlorothiazide (HYZAAR) 100-25 MG per tablet Take 1 tablet by mouth Daily.      • Multiple Vitamins-Minerals (multivitamin, eye vitamin,) tablet tablet Take 1 tablet by mouth Daily.      • omeprazole (priLOSEC) 40 MG capsule Take 40 mg by mouth Daily.          No Known Allergies    Objective   Objective     Vital Signs:   Temp:  [97.8 °F (36.6 °C)-98.7 °F (37.1 °C)] 98.7 °F (37.1 °C)  Heart Rate:  [62-65] 62  Resp:  [17-18] 18  BP: (132-195)/() 149/80   Total (NIH Stroke Scale): 0    Physical Exam   Constitutional: Awake, alert, NAD, laying in bed  Eyes: PERRL, sclerae anicteric, no conjunctival injection  HENT: NCAT, mucous membranes moist  Neck: Supple, trachea midline  Respiratory: Clear to auscultation bilaterally, nonlabored respirations   Cardiovascular: RRR, no murmurs, rubs, or gallops, palpable radial pulses bilaterally  Gastrointestinal: Positive bowel sounds, soft, nontender, nondistended  Musculoskeletal: No bilateral ankle edema, no clubbing or cyanosis to extremities  Psychiatric: Appropriate affect, cooperative  Neurologic: Oriented x 3, strength symmetric in all extremities, speech clear    Results Reviewed:  I have personally reviewed most recent indicated data and agree with findings including:  []  Laboratory  [x]  Radiology  [x]  EKG/Telemetry  []  Pathology  []   Cardiac/Vascular Studies  []  Old records  []  Other:  Most pertinent findings include: NSR on telemetry, CT imaging/perfusion with potential area of watershed poor perfusion, old stroke              Invalid input(s):  ALKPHOS  CrCl cannot be calculated (No successful lab value found.).  Brief Urine Lab Results     None        Imaging Results (Last 24 Hours)     Procedure Component Value Units Date/Time    CT Cerebral Perfusion With & Without Contrast [[id number]] Collected: 10/14/20 1643     Updated: 10/14/20 2039    Narrative:      EXAMINATION: CT CEREBRAL PERFUSION W WO CONTRAST-10/14/2020:      INDICATION: Transient ischemic attack (TIA); Z00.6-Encounter for  examination for normal comparison and control in clinical research  program.     TECHNIQUE: Cerebral perfusion analysis was performed using computed  tomography with IV contrast administration, including postprocessing of  parametric maps with determination of cerebral blood flow, cerebral  blood volume, mean transit time and time to drain.     The radiation dose reduction device was turned on for each scan per the  ALARA (As Low as Reasonably Achievable) protocol.     COMPARISON: Unenhanced CT scan of same day.     FINDINGS: RAPID analysis shows no areas of the brain with cerebral blood  flow less than 30% or T-Max greater than six seconds. Individual  perfusion maps, however, show a roughly 7 x 3 cm area of increased time  to drain, mean transit time and T-Max in the left medial parietal lobe,  perhaps extending into the occipital lobe. There is mild, if any  asymmetry on cerebral blood flow images, and cerebral blood volume  images appear appear essentially normal. This suggests possibility of  left posterior cerebral artery territory slow flow, without apparent  ischemia or infarct.       Impression:      1. Negative RAPID study for significant ischemia or infarct.  2. Individual perfusion maps suggest slow flow centered in the medial  left parietal lobe,  perhaps in the posterior left MCA/PCA watershed  territory.     D:  10/14/2020  E:  10/14/2020           This report was finalized on 10/14/2020 8:36 PM by Dr. Nabeel Kendrick MD.       CT Head Without Contrast [[id number]] Collected: 10/14/20 1636     Updated: 10/14/20 1644    Narrative:      EXAMINATION: CT HEAD WO CONTRAST-      INDICATION: stroke; Z00.6-Encounter for examination for normal  comparison and control in clinical research program     TECHNIQUE: 5 mm unenhanced images through the brain     The radiation dose reduction device was turned on for each scan per the  ALARA (As Low as Reasonably Achievable) protocol.     COMPARISON: NONE     FINDINGS: Patient history indicates stroke, right arm numbness.     The calvarium appears intact. Included paranasal sinuses and mastoids  appear clear. Soft tissue window images show expected degree of  generalized cervical atrophy for age. There is extensive central white  matter change, typical of microvascular leukoencephalopathy.  Low-attenuation changes in both right and left basal ganglia may  represent chronic ischemic foci. There is a sharply defined CSF density  2.2 cm lesion of the medial left temporal lobe is in the usual location  of a normal variant dilated perivascular space, although larger than  typically seen, possibly an arachnoid cyst, but likely benign in any  event. No similar findings are identified elsewhere. There is no  apparent well-defined edema/infarct, no evidence of hemorrhage,  hydrocephalus, soft tissue mass or significant mass effect, or abnormal  extra-axial collection.                   Impression:      1. Extensive central white matter changes, likely representing chronic  small vessel disease. Punctate low-density changes of the right and left  basal ganglia, favored to represent small old ischemic lesions.  2. Probable 2.2 cm arachnoid cyst of the medial left temporal lobe, as  noted above.  3. No evidence of intracranial hemorrhage,  well-defined infarct, or  other clearly acute intracranial abnormality.              Note: Exam time is shown as 4:23 PM. Study was reviewed dictated and  signed at 4:40 PM.     This report was finalized on 10/14/2020 4:41 PM by Dr. Nabeel Kendrick MD.       MRI Outside Films [[id number]] Resulted: 10/14/20 1632     Updated: 10/14/20 1632    Narrative:      This procedure was auto-finalized with no dictation required.    CT Outside Films [[id number]] Resulted: 10/14/20 1630     Updated: 10/14/20 1630    Narrative:      This procedure was auto-finalized with no dictation required.    CT Outside Films [[id number]] Resulted: 10/14/20 1629     Updated: 10/14/20 1629    Narrative:      This procedure was auto-finalized with no dictation required.             Assessment/Plan   Assessment & Plan     Active Hospital Problems    Diagnosis POA   • HTN (hypertension) [I10] Unknown   • HLD (hyperlipidemia) [E78.5] Unknown   • Stroke (CMS/HCC) [I63.9] Yes     Summary: This is a 75-year-old female with history of breast cancer now on Rheumatrex, HTN, dyslipidemia transferred from outside facility for 1 month of right upper extremity and right perioral numbness with associated blurred vision headache concerns for posterior circulation strokes on imaging transferred for stroke work-up    Assessment/plan    Stroke evaluation  - Imaging at outside hospital concerning for stroke  -Imaging here concerning for old stroke in the left temporal region, perfusion study with possible low flow/watershed area  -Seen by the stroke navigator who initiated the ischemic stroke order set, will not duplicate  -Had echo at outside facility  - Aspirin, Plavix, Lipitor started by stroke navigator  - Risk factor modification, monitor on telemetry  -Basic labs    HTN  - Restart home losartan, monitor blood pressure, eventually restart HCTZ    Dyslipidemia  - Atorvastatin high intensity, we do not have gemfibrozil so we will hold at this time    DVT prophylaxis:  Mechanical    CODE STATUS:   Code Status and Medical Interventions:   Ordered at: 10/14/20 1728     Level Of Support Discussed With:    Patient     Code Status:    CPR     Medical Interventions (Level of Support Prior to Arrest):    Full       Admission Status:  I believe this patient meets INPATIENT status due to stroke evaluation with abnormal brain imaging.  I feel patient’s risk for adverse outcomes and need for care warrant INPATIENT evaluation and I predict the patient’s care encounter to likely last beyond 2 midnights.    Linus Dean, DO  10/15/20

## 2020-10-15 NOTE — NURSING NOTE
Patient complaining of right foot numbness and tingling around her mouth. Dr. Diandra anderson. HR-58, B/P-133/78, O2-94% on room air, glucose-168. 500  saline bolus given. MD came to unit to access patient. See new orders. Will continue to monitor.

## 2020-10-15 NOTE — PROGRESS NOTES
Stroke Progress Note       Chief Complaint:  Vision problems     Subjective    Subjective     Subjective:  Decreased vision    Review of Systems   Constitutional: No fatigue  HENT: Negative for nosebleeds and rhinorrhea.    Eyes: Negative for redness.   Respiratory: Negative for cough.    Gastrointestinal: Negative for anal bleeding.   Endocrine: Negative for polydipsia.   Genitourinary: Negative for enuresis and urgency.   Musculoskeletal: Negative for joint swelling.   Neurological: Negative for tremors.   Psychiatric/Behavioral: Negative for hallucinations.     Objective      Temp:  [97.8 °F (36.6 °C)-98.7 °F (37.1 °C)] 98 °F (36.7 °C)  Heart Rate:  [50-65] 54  Resp:  [17-18] 17  BP: (130-195)/() 138/77    GEN: NAD, pleasant, cooperative  Eyes-show anicteric sclera, moist conjunctiva with no lid lag, no redness  Neck-trachea midline.  There is no thyromegaly.  ENMT-oropharynx clear with moist mucous membranes and good dentition.  Skin-no rash, lesions or ulcers.  Cardiovascular exam-no pedal edema, regular rate and rhythm.  CHEST: No signs of resp distress, on room air  Abdomen-no abdominal distention, nontender.  Psychiatric exam-alert oriented x3 with intact judgment and insight      NEURO    MENTAL STATUS: AAOx3, memory intact, fund of knowledge appropriate    LANG/SPEECH: Naming and repetition intact, fluent, follows 3-step commands    CRANIAL NERVES:      II: Pupils equal and reactive, no RAPD, Right quadrantanopsia     III, IV, VI: EOM intact, no gaze preference or deviation, no nystagmus.      V: normal sensation in V1, V2, and V3 segments bilaterally      VII: no asymmetry, no nasolabial fold flattening      VIII: normal hearing to speech      IX, X: normal palatal elevation, no uvular deviation      XI: 5/5 head turn and 5/5 shoulder shrug bilaterally      XII: midline tongue protrusion    MOTOR:  Normal tone throughout  5/5 muscle power in Rt shoulder abductors/adductors, elbow flexors/extensors,  wrist flexors/extensors, finger abductors/adductors.  5/5 in Rt hip flexors/extensors, knee flexors/extensors, ankle dorsiflexors and plantar flexors.    5/5 muscle power in Lt shoulder abductors/adductors, elbow flexors/extensors, wrist flexors/extensors, finger abductors/adductors.  5/5 in Lt hip flexors/extensors, knee flexors/extensors, ankle dorsiflexors and plantea flexors.    REFLEXES:  no Gonsales's, no clonus    SENSORY:    Normal to light touch all throughout     COORDINATION: Normal finger to nose and heel to shin, no tremor, no dysmetria    STATION: Not assessed due to patient condition    GAIT: Not assessed due to patient condition      Results Review:    I reviewed the patient's new clinical results.    Lab Results (last 24 hours)     Procedure Component Value Units Date/Time    Protime-INR [[id number]]  (Normal) Collected: 10/15/20 0332    Specimen: Blood Updated: 10/15/20 0506     Protime 13.9 Seconds      INR 1.09    Lipid Panel [[id number]]  (Abnormal) Collected: 10/15/20 0331    Specimen: Blood Updated: 10/15/20 0502     Total Cholesterol 203 mg/dL      Triglycerides 163 mg/dL      HDL Cholesterol 30 mg/dL      LDL Cholesterol  143 mg/dL      VLDL Cholesterol 30 mg/dL      LDL/HDL Ratio 4.68    Narrative:      Cholesterol Reference Ranges  (U.S. Department of Health and Human Services ATP III Classifications)    Desirable          <200 mg/dL  Borderline High    200-239 mg/dL  High Risk          >240 mg/dL      Triglyceride Reference Ranges  (U.S. Department of Health and Human Services ATP III Classifications)    Normal           <150 mg/dL  Borderline High  150-199 mg/dL  High             200-499 mg/dL  Very High        >500 mg/dL    HDL Reference Ranges  (U.S. Department of Health and Human Services ATP III Classifcations)    Low     <40 mg/dl (major risk factor for CHD)  High    >60 mg/dl ('negative' risk factor for CHD)        LDL Reference Ranges  (U.S. Department of Health and Human Services  ATP III Classifcations)    Optimal          <100 mg/dL  Near Optimal     100-129 mg/dL  Borderline High  130-159 mg/dL  High             160-189 mg/dL  Very High        >189 mg/dL    Comprehensive Metabolic Panel [[id number]]  (Abnormal) Collected: 10/15/20 0331    Specimen: Blood Updated: 10/15/20 0502     Glucose 106 mg/dL      BUN 21 mg/dL      Creatinine 0.88 mg/dL      Sodium 141 mmol/L      Potassium 3.8 mmol/L      Chloride 108 mmol/L      CO2 21.0 mmol/L      Calcium 9.6 mg/dL      Total Protein 6.6 g/dL      Albumin 4.10 g/dL      ALT (SGPT) 21 U/L      AST (SGOT) 25 U/L      Alkaline Phosphatase 112 U/L      Total Bilirubin 0.2 mg/dL      eGFR Non African Amer 63 mL/min/1.73      Globulin 2.5 gm/dL      A/G Ratio 1.6 g/dL      BUN/Creatinine Ratio 23.9     Anion Gap 12.0 mmol/L     Narrative:      GFR Normal >60  Chronic Kidney Disease <60  Kidney Failure <15      Hemoglobin A1c [[id number]]  (Abnormal) Collected: 10/15/20 0331    Specimen: Blood Updated: 10/15/20 0500     Hemoglobin A1C 5.70 %     Narrative:      Hemoglobin A1C Ranges:    Increased Risk for Diabetes  5.7% to 6.4%  Diabetes                     >= 6.5%  Diabetic Goal                < 7.0%    CBC & Differential [[id number]]  (Abnormal) Collected: 10/15/20 0331    Specimen: Blood Updated: 10/15/20 0447    Narrative:      The following orders were created for panel order CBC & Differential.  Procedure                               Abnormality         Status                     ---------                               -----------         ------                     CBC Auto Differential[[id number]]        Abnormal            Final result                 Please view results for these tests on the individual orders.    CBC Auto Differential [[id number]]  (Abnormal) Collected: 10/15/20 0331    Specimen: Blood Updated: 10/15/20 0447     WBC 8.07 10*3/mm3      RBC 4.14 10*6/mm3      Hemoglobin 13.3 g/dL      Hematocrit 40.3 %      MCV 97.3 fL      MCH 32.1  pg      MCHC 33.0 g/dL      RDW 12.3 %      RDW-SD 44.2 fl      MPV 10.9 fL      Platelets 267 10*3/mm3      Neutrophil % 47.8 %      Lymphocyte % 38.4 %      Monocyte % 8.4 %      Eosinophil % 4.1 %      Basophil % 1.1 %      Immature Grans % 0.2 %      Neutrophils, Absolute 3.85 10*3/mm3      Lymphocytes, Absolute 3.10 10*3/mm3      Monocytes, Absolute 0.68 10*3/mm3      Eosinophils, Absolute 0.33 10*3/mm3      Basophils, Absolute 0.09 10*3/mm3      Immature Grans, Absolute 0.02 10*3/mm3      nRBC 0.0 /100 WBC     POC Glucose Once [[id number]]  (Normal) Collected: 10/14/20 2354    Specimen: Blood Updated: 10/14/20 2356     Glucose 115 mg/dL     POC Glucose Once [[id number]]  (Normal) Collected: 10/14/20 1819    Specimen: Blood Updated: 10/14/20 1821     Glucose 127 mg/dL         Ct Head Without Contrast    Result Date: 10/14/2020  1. Extensive central white matter changes, likely representing chronic small vessel disease. Punctate low-density changes of the right and left basal ganglia, favored to represent small old ischemic lesions. 2. Probable 2.2 cm arachnoid cyst of the medial left temporal lobe, as noted above. 3. No evidence of intracranial hemorrhage, well-defined infarct, or other clearly acute intracranial abnormality.     Note: Exam time is shown as 4:23 PM. Study was reviewed dictated and signed at 4:40 PM.  This report was finalized on 10/14/2020 4:41 PM by Dr. Nabeel Kendrick MD.      Ct Cerebral Perfusion With & Without Contrast    Result Date: 10/14/2020  1. Negative RAPID study for significant ischemia or infarct. 2. Individual perfusion maps suggest slow flow centered in the medial left parietal lobe, perhaps in the posterior left MCA/PCA watershed territory.  D:  10/14/2020 E:  10/14/2020    This report was finalized on 10/14/2020 8:36 PM by Dr. Nabeel Kendrick MD.            Assessment/Plan     Assessment/Plan:    75 right-handed white female with a history of breast cancer status post mastectomy 3 years  ago, currently taking hormone supplement, hypertension, hyperlipidemia had subacute onset of symptoms including right arm numbness, vision and memory issues.           Diagnostics- I personally reviewed all the imaging and labs  -CT head-no acute abnormality, chronic white matter changes, arachnoid cyst in the left temporal lobe.  -CT head and neck-intracranial atherosclerosis, questionable right V4 occlusion/ending in PICA, right before atherosclerosis, left P1 P2 junction occlusion.  -MRI brain-  infarcts in the left splenium of corpus callosum and left occipital lobe.   -Transesophageal echo pending  -143 LDL, triglycerides 163, cholesterol 203, A1c 5.7       1a. Acute ischemic stroke, left PCA, likely etiology small vessel versus cardio embolic  We will plan to get a transesophageal echo, cardiac event monitor at discharge  1b. Stroke secondary prevention-aspirin Plavix for 90 days followed by aspirin monotherapy.  1c. Stroke recovery- Activity, PT/OT/Speech- full consult  1d. Stroke education- Educated on modifiable stroke risk factors.                       #2 Hypertension-normalize blood pressure goals  #4 Hyperlipidemia- LDL goal less than < 70. Start high intensity statin        This was a high complex patient and nature of presentation was acute necessitating evaluation for life/limb saving condition. The test results were discussed with the patient/ family and with admitting/primary physician taking care in the hospital and recommended further diagnostics and management plans in a collaborative fashion.               Zach Jim MD  10/15/20  10:33 EDT

## 2020-10-15 NOTE — PROGRESS NOTES
Patient seen and examined at the bedside.  Patient resting in bed in no acute distress eating her lunch.  Tells me that she feels much better but earlier today she had an episode of low blood pressure and she experienced stroke-like symptoms for a very short period of time.  Currently all symptoms resolved.  -Continue current care.  Put parameters on blood pressure medications.  -Neurology has seen and evaluated the patient.  They recommend dual platelet therapy for 90 days.  He also recommend cardiac monitoring for possible atrial fibrillation on discharge.  -Medicine will follow.

## 2020-10-15 NOTE — THERAPY DISCHARGE NOTE
Acute Care - Occupational Therapy Discharge  [facility]    Patient Name: Yumi Crooks  : 1945    MRN: [id number]                              Today's Date: 10/15/2020       Admit Date: 10/14/2020    Visit Dx:     ICD-10-CM ICD-9-CM   1. Examination for normal comparison for clinical research  Z00.6 V70.7     Patient Active Problem List   Diagnosis   • Stroke (CMS/HCC)   • HTN (hypertension)   • HLD (hyperlipidemia)     Past Medical History:   Diagnosis Date   • Dyslipidemia    • Hypertension      Past Surgical History:   Procedure Laterality Date   • BREAST BIOPSY Right          General Information     Row Name 10/15/20 1450          OT Time and Intention    Document Type  discharge evaluation/summary  -AN     Row Name 10/15/20 145          General Information    Patient Profile Reviewed  yes  -AN     Prior Level of Function  independent:;all household mobility;community mobility;transfer;bed mobility;ADL's;home management;cleaning;driving;using stairs pt takes care of spouse with physical deficits at home  -AN     Existing Precautions/Restrictions  no known precautions/restrictions  -AN     Barriers to Rehab  none identified  -AN     Row Name 10/15/20 [facility]          Living Environment    Lives With  spouse  -AN     Row Name 10/15/20 [facility]          Home Main Entrance    Number of Stairs, Main Entrance  none  -AN     Row Name 10/15/20 145          Stairs Within Home, Primary    Number of Stairs, Within Home, Primary  none  -AN     Row Name 10/15/20 [facility]0          Cognition    Orientation Status (Cognition)  oriented x 4  -AN     Row Name 10/15/20 [facility]0          Safety Issues, Functional Mobility    Comment, Safety Issues/Impairments (Mobility)  Pt reports decreased sensation in R toes, but did not affect balance during eval.  -AN       User Key  (r) = Recorded By, (t) = Taken By, (c) = Cosigned By    Initials Name Provider Type    AN Isi Varghese, OT Occupational Therapist        Mobility/ADL's     Row  Name 10/15/20 1456          Bed Mobility    Bed Mobility  -- pt out of bed  -AN     Row Name 10/15/20 1456          Transfers    Transfers  sit-stand transfer  -AN     Sit-Stand [place] (Transfers)  standby assist  -AN     Row Name 10/15/20 1456          Functional Mobility    Functional Mobility- Ind. Level  supervision required  -AN     Functional Mobility-Distance (Feet)  40  -AN     Functional Mobility- Comment  pt did not need assist, nor did she have LOB; pt reports she is just somewhat slower  -AN     Row Name 10/15/20 1456          Activities of Daily Living    BADL Assessment/Intervention  lower body dressing;grooming;bathing  -AN     Row Name 10/15/20 1456          Lower Body Dressing Assessment/Training    [place] Level (Lower Body Dressing)  don;shoes/slippers;socks;set up;modified independence  -AN     Position (Lower Body Dressing)  unsupported sitting  -AN     Row Name 10/15/20 1456          Grooming Assessment/Training    [place] Level (Grooming)  hair care, combing/brushing;independent  -AN     Row Name 10/15/20 1456          Bathing Assessment/Intervention    Comment (Bathing)  daughter reports she was supv for pt's shower with shower chair prior to OT eval  -AN       User Key  (r) = Recorded By, (t) = Taken By, (c) = Cosigned By    Initials Name Provider Type    AN Isi Varghese, OT Occupational Therapist        Obj/Interventions     Row Name 10/15/20 1458          Vision Assessment/Intervention    Visual Impairment/Limitations  unable/difficult to assess pt appeared to have some difficulty in R medial upper quadrant, pt denies with basic screening  -AN     Row Name 10/15/20 1458          Range of Motion Comprehensive    General Range of Motion  no range of motion deficits identified  -AN     Row Name 10/15/20 1458          Strength Comprehensive (MMT)    General Manual Muscle Testing (MMT) Assessment  no strength deficits identified  -AN     Comment, General Manual Muscle Testing  (MMT) Assessment  Johnathan UE WFl  -AN     Row Name 10/15/20 1458          Balance    Static Sitting Balance  WNL  -AN     Dynamic Sitting Balance  WNL  -AN     Static Standing Balance  WNL  -AN     Dynamic Standing Balance  WFL  -AN       User Key  (r) = Recorded By, (t) = Taken By, (c) = Cosigned By    Initials Name Provider Type    Isi Waggoner OT Occupational Therapist        Goals/Plan    No documentation.       Clinical Impression     Row Name 10/15/20 1502          Pain Scale: Numbers Pre/Post-Treatment    Pretreatment Pain Rating  0/10 - no pain  -AN     Posttreatment Pain Rating  0/10 - no pain  -AN     Row Name 10/15/20 1502          Plan of Care Review    Plan of Care Reviewed With  patient  -AN     Outcome Summary  Pt does not exhibit any functional decline despite c/o numbness in R side toes, possible slight change in R vision. Pt reports she is fatigued, but has cardiology tests tomorrow. OT recommends home with assist from family to manage household and assist spouse.  -AN     Row Name 10/15/20 1502          Therapy Assessment/Plan (OT)    Therapy Frequency (OT)  evaluation only  -AN     [facility] Name 10/15/20 1502          Vital Signs    Pre Systolic BP Rehab  111  -AN     Pre Treatment Diastolic BP  68  -AN     Post Systolic BP Rehab  128  -AN     Post Treatment Diastolic BP  70  -AN     Pre SpO2 (%)  93  -AN     O2 Delivery Pre Treatment  room air  -AN     Post SpO2 (%)  95  -AN     O2 Delivery Post Treatment  room air  -AN     [facility] Name 10/15/20 1502          Positioning and Restraints    Pre-Treatment Position  standing in room  -AN     Post Treatment Position  chair  -AN     In Chair  with family/caregiver;reclined;call light within reach;encouraged to call for assist  -AN       User Key  (r) = Recorded By, (t) = Taken By, (c) = Cosigned By    Initials Name Provider Type    Isi Waggoner, FLAKO Occupational Therapist        Outcome Measures     Row Name 10/15/20 1507          How much help from  another is currently needed...    Putting on and taking off regular lower body clothing?  4  -AN     Bathing (including washing, rinsing, and drying)  4  -AN     Toileting (which includes using toilet bed pan or urinal)  4  -AN     Putting on and taking off regular upper body clothing  4  -AN     Taking care of personal grooming (such as brushing teeth)  4  -AN     Eating meals  4  -AN     AM-PAC 6 Clicks Score (OT)  24  -AN     Row Name 10/15/20 1507          Modified [place] Scale    Modified [place] Scale  1 - No significant disability despite symptoms.  Able to carry out all usual duties and activities.  -AN     Row Name 10/15/20 1507          Functional Assessment    Outcome Measure Options  AM-PAC 6 Clicks Daily Activity (OT)  -AN       User Key  (r) = Recorded By, (t) = Taken By, (c) = Cosigned By    Initials Name Provider Type    Isi Waggoner, OT Occupational Therapist          OT Recommendation and Plan  Retired Outcome Summary/Treatment Plan (OT)  Anticipated Discharge Disposition (OT): home  Therapy Frequency (OT): evaluation only  Plan of Care Review  Plan of Care Reviewed With: patient  Outcome Summary: Pt does not exhibit any functional decline despite c/o numbness in R side toes, possible slight change in R vision. Pt reports she is fatigued, but has cardiology tests tomorrow. OT recommends home with assist from family to manage household and assist spouse.  Plan of Care Reviewed With: patient  Outcome Summary: Pt does not exhibit any functional decline despite c/o numbness in R side toes, possible slight change in R vision. Pt reports she is fatigued, but has cardiology tests tomorrow. OT recommends home with assist from family to manage household and assist spouse.     Time Calculation:   Time Calculation- OT     Row Name 10/15/20 1509             Time Calculation- OT    OT Start Time  1424  -AN      Total Timed Code Minutes- OT  0 minute(s)  -AN      OT Received On  10/15/20  -AN        User Key   (r) = Recorded By, (t) = Taken By, (c) = Cosigned By    Initials Name Provider Type    Isi Waggoner OT Occupational Therapist        Therapy Charges for Today     Code Description Service Date Service Provider Modifiers Qty    [id number]  OT EVAL LOW COMPLEXITY 4 10/15/2020 Isi Varghese OT GO 1               Iis Varghese OT  10/15/2020

## 2020-10-15 NOTE — THERAPY DISCHARGE NOTE
Patient Name: Yumi Crooks  : 1945    MRN: [id number]                              Today's Date: 10/15/2020       Admit Date: 10/14/2020    Visit Dx:     ICD-10-CM ICD-9-CM   1. Examination for normal comparison for clinical research  Z00.6 V70.7     Patient Active Problem List   Diagnosis   • Stroke (CMS/HCC)   • HTN (hypertension)   • HLD (hyperlipidemia)     Past Medical History:   Diagnosis Date   • Dyslipidemia    • Hypertension      Past Surgical History:   Procedure Laterality Date   • BREAST BIOPSY Right          General Information     Row Name 10/15/20 0930          Physical Therapy Time and Intention    Document Type  discharge evaluation/summary  -CD     Mode of Treatment  physical therapy  -CD     Row Name 10/15/20 0930          General Information    Patient Profile Reviewed  yes  -CD     Prior Level of Function  independent:;all household mobility;community mobility;driving;yard work;ADL's  -CD     Existing Precautions/Restrictions  no known precautions/restrictions  -CD     Barriers to Rehab  none identified  -CD     Row Name 10/15/20 0930          Living Environment    Lives With  spouse 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  -CD     Row Name 10/15/20 0930          Home Main Entrance    Number of Stairs, Main Entrance  none  -CD     Row Name 10/15/20 0930          Stairs Within Home, Primary    Number of Stairs, Within Home, Primary  none  -CD     Row Name 10/15/20 0930          Cognition    Orientation Status (Cognition)  oriented x 4  -CD     Row Name 10/15/20 0930          Safety Issues, Functional Mobility    Comment, Safety Issues/Impairments (Mobility)  MILD WEAKNESS  R LE BUT DOES NOT IMPACT FUNCTION.  -CD       User Key  (r) = Recorded By, (t) = Taken By, (c) = Cosigned By    Initials Name Provider Type    CD Britni Norris PT Physical Therapist        Mobility     Row Name 10/15/20 0932          Bed Mobility    Bed Mobility  bed mobility (all) activities   -CD     All Activities, [place] (Bed Mobility)  independent  -CD     Row Name 10/15/20 0932          Sit-Stand Transfer    Sit-Stand [place] (Transfers)  independent  -CD     Row Name 10/15/20 0932          Gait/Stairs (Locomotion)    [place] Level (Gait)  independent  -CD     Distance in Feet (Gait)  400 FEET  -CD     Comment (Gait/Stairs)  NO LOB OR DIFFICULTY WITH BACKWARDS GAIT, TURNING 360 DEGREES OR RETRIEVING ITEM FROM FLOOR.  -CD       User Key  (r) = Recorded By, (t) = Taken By, (c) = Cosigned By    Initials Name Provider Type    CD Britni Norris PT Physical Therapist        Obj/Interventions     Row Name 10/15/20 0936          Range of Motion Comprehensive    General Range of Motion  bilateral lower extremity ROM WFL  -CD     Row Name 10/15/20 0936          Strength Comprehensive (MMT)    Comment, General Manual Muscle Testing (MMT) Assessment  MILD WEAKNESS B HIP FLEX 4/5, R DF 4/5 OTHERWISE 5/5 B LE'S  -CD     Row Name 10/15/20 0936          Balance    Balance Assessment  sitting static balance;standing static balance;standing dynamic balance;sitting dynamic balance  -CD     Static Sitting Balance  WNL  -CD     Dynamic Sitting Balance  WNL  -CD     Static Standing Balance  WNL  -CD     Dynamic Standing Balance  WNL  -CD     Comment, Balance  SEE GAIT NOTE.  -CD     Row Name 10/15/20 0936          Sensory Assessment (Somatosensory)    Sensory Assessment (Somatosensory)  sensation intact B LE INTACT AND SYMMETRICAL TO LT TOUCH . PT ABLE TO READ SIGNS IN [place] ON L/R WHILE AMBULATING.  -CD       User Key  (r) = Recorded By, (t) = Taken By, (c) = Cosigned By    Initials Name Provider Type    Britni Cornell PT Physical Therapist        Goals/Plan    No documentation.       Clinical Impression     Row Name 10/15/20 0937          Pain    Additional Documentation  Pain Scale: Numbers Pre/Post-Treatment (Group)  -CD     Row Name 10/15/20 0937          Pain Scale: Numbers Pre/Post-Treatment     Pretreatment Pain Rating  0/10 - no pain  -CD     Posttreatment Pain Rating  0/10 - no pain  -CD     Row Name 10/15/20 0937          Plan of Care Review    Plan of Care Reviewed With  patient;daughter  -CD     Outcome Summary  GOALS NOT ESTABLISHED AS PT IS AT BASELINE WITH FUNCTIONAL MOBILITY. DEMONSTRATED INDEPENDENCE WITH DYNAMIC BALANCE ACTIVITIES AND GAIT X 400 FEET. RECOMMEND D/C HOME.  -CD     Row Name 10/15/20 0937          Therapy Assessment/Plan (PT)    Patient/Family Therapy Goals Statement (PT)  TO GO HOME.  -CD     Criteria for Skilled Interventions Met (PT)  no  -CD     Row Name 10/15/20 0937          Vital Signs    Pre Systolic BP Rehab  138  -CD     Pre Treatment Diastolic BP  77  -CD     Post Systolic BP Rehab  155  -CD     Post Treatment Diastolic BP  137  -CD     Posttreatment Heart Rate (beats/min)  67  -CD     Pre SpO2 (%)  97  -CD     O2 Delivery Pre Treatment  room air  -CD     O2 Delivery Intra Treatment  room air  -CD     Post SpO2 (%)  97  -CD     Pre Patient Position  Supine  -CD     Intra Patient Position  Standing  -CD     Post Patient Position  Sitting  -CD     Row Name 10/15/20 0937          Positioning and Restraints    Pre-Treatment Position  in bed  -CD     Post Treatment Position  chair  -CD     In Chair  reclined;notified nsg;legs elevated;exit alarm on;encouraged to call for assist DR MONZON PRESENT.  -CD       User Key  (r) = Recorded By, (t) = Taken By, (c) = Cosigned By    Initials Name Provider Type    CD Britni Norris, PT Physical Therapist        Outcome Measures     Row Name 10/15/20 0942          How much help from another person do you currently need...    Turning from your back to your side while in flat bed without using bedrails?  4  -CD     Moving from lying on back to sitting on the side of a flat bed without bedrails?  4  -CD     Moving to and from a bed to a chair (including a wheelchair)?  4  -CD     Standing up from a chair using your arms (e.g., wheelchair,  bedside chair)?  4  -CD     Climbing 3-5 steps with a railing?  4  -CD     To walk in hospital room?  4  -CD     AM-PAC 6 Clicks Score (PT)  24  -CD     Row Name 10/15/20 0942          Modified [place] Scale    Modified [place] Scale  1 - No significant disability despite symptoms.  Able to carry out all usual duties and activities. MILD WEAKNESS R DF.  -CD     Row Name 10/15/20 0942          Functional Assessment    Outcome Measure Options  AM-PAC 6 Clicks Basic Mobility (PT);Modified [place]  -CD       User Key  (r) = Recorded By, (t) = Taken By, (c) = Cosigned By    Initials Name Provider Type    CD Britni Norris PT Physical Therapist        Physical Therapy Education                 Title: PT OT SLP Therapies (In Progress)     Topic: Physical Therapy (In Progress)     Point: Mobility training (Not Started)     Learner Progress:  Not documented in this visit.          Point: Home exercise program (Not Started)     Learner Progress:  Not documented in this visit.          Point: Body mechanics (Not Started)     Learner Progress:  Not documented in this visit.          Point: Precautions (Done)     Learning Progress Summary           Patient Acceptance, E, VU by CD at 10/15/2020 0943    Comment: [employer] CON[employer],  F.A.S.T.   Caregiver Acceptance, E, VU by CD at 10/15/2020 0943    Comment: [employer] CON[employer],  F.A.S.T.                               User Key     Initials Effective Dates Name Provider Type Discipline    CD 06/19/15 -  Britni Norris PT Physical Therapist PT              PT Recommendation and Plan     Plan of Care Reviewed With: patient, daughter  Outcome Summary: GOALS NOT ESTABLISHED AS PT IS AT BASELINE WITH FUNCTIONAL MOBILITY. DEMONSTRATED INDEPENDENCE WITH DYNAMIC BALANCE ACTIVITIES AND GAIT X 400 FEET. RECOMMEND D/C HOME.     Time Calculation:   PT Charges     Row Name 10/15/20 0944             Time Calculation    Start Time  0855  -CD      PT Received On  10/15/20  -        User Key  (r) = Recorded By, (t) =  Taken By, (c) = Cosigned By    Initials Name Provider Type    CD Britni Norris, PT Physical Therapist        Therapy Charges for Today     Code Description Service Date Service Provider Modifiers Qty    16675958277 HC PT EVAL LOW COMPLEXITY 4 10/15/2020 Britni Norris, PT GP 1          PT G-Codes  Outcome Measure Options: AM-PAC 6 Clicks Basic Mobility (PT), Modified [place]  AM-PAC 6 Clicks Score (PT): 24  Modified [place] Scale: 1 - No significant disability despite symptoms.  Able to carry out all usual duties and activities.(MILD WEAKNESS R DF.)    Britni Norris, PT  10/15/2020

## 2020-10-15 NOTE — PLAN OF CARE
Problem: Adult Inpatient Plan of Care  Goal: Plan of Care Review  Outcome: Ongoing, Progressing  Flowsheets (Taken 10/15/2020 0937 by Britni Norris PT)  Plan of Care Reviewed With:   patient   daughter    SLP evaluation completed. Will sign-off as no deficits identified with speech, language or cognition at this time. Please see note for further details and recommendations.

## 2020-10-15 NOTE — THERAPY DISCHARGE NOTE
Acute Care - Speech Language Pathology Initial Evaluation/Discharge  [facility]   Cognitive-Communication Evaluation       Patient Name: Yumi Crooks  : 1945  MRN: [id number]  Today's Date: 10/15/2020               Admit Date: 10/14/2020     Visit Dx:    ICD-10-CM ICD-9-CM   1. Examination for normal comparison for clinical research  Z00.6 V70.7     Patient Active Problem List   Diagnosis   • Stroke (CMS/HCC)   • HTN (hypertension)   • HLD (hyperlipidemia)     Past Medical History:   Diagnosis Date   • Dyslipidemia    • Hypertension      Past Surgical History:   Procedure Laterality Date   • BREAST BIOPSY Right               SLP EVALUATION (last 72 hours)      SLP SLC Evaluation     Row Name 10/15/20 1030                   Communication Assessment/Intervention    Document Type  discharge evaluation/summary  -        Subjective Information  no complaints  -        Patient Observations  alert;cooperative;agree to therapy  -        Patient/Family/Caregiver Comments/Observations  daughter present  -        Patient Effort  excellent  -           General Information    Patient Profile Reviewed  yes  -        Pertinent History Of Current Problem  Pt. admitted on stroke pathway with acute R arm numbness, vision and recall issues. SLC evaluation completed per stroke protocol.   -        Precautions/Limitations, Vision  WFL;for purposes of eval  -CH        Precautions/Limitations, Hearing  WFL;for purposes of eval  -        Prior Level of Function-Communication  WFL  -        Plans/Goals Discussed with  patient and family;agreed upon  -        Barriers to Rehab  none identified  -        Patient's Goals for Discharge  return to home  -           Pain    Additional Documentation  Pain Scale: FACES Pre/Post-Treatment (Group)  -           Pain Scale: Numbers Pre/Post-Treatment    Pretreatment Pain Rating  0/10 - no pain  -        Posttreatment Pain Rating  0/10 - no pain  -            Pain Scale: FACES Pre/Post-Treatment    Pain: FACES Scale, Pretreatment  0-->no hurt  -CH        Posttreatment Pain Rating  0-->no hurt  -CH           Comprehension Assessment/Intervention    Comprehension Assessment/Intervention  Auditory Comprehension;Reading Comprehension  -           Auditory Comprehension Assessment/Intervention    Auditory Comprehension (Communication)  WNL  -        Answers Questions (Communication)  yes/no;wh questions;personal;simple;concrete;mulit-unit;abstract;WFL  -        Able to Follow Commands (Communication)  multi-step;L  -        Narrative Discourse  conversational level;L  -           Reading Comprehension Assessment/Intervention    Reading Comprehension (Communication)  WNL  -        Functional Reading Tasks  WNL  -           Expression Assessment/Intervention    Expression Assessment/Intervention  verbal expression;graphic expression  -           Verbal Expression Assessment/Intervention    Verbal Expression  WNL  -        Repetition  sentences;WNL  -        Phrase Completion  unpredictable;WNL  -        Responsive Naming  complex;[facility]  -        Confrontational Naming  low frequency;[facility]  -        Spontaneous/Functional Words  complex;[facility]  -        Sentence Formulation  complex;[facility]  -        Conversational Discourse/Fluency  WN  -           Graphic Expression Assessment/Intervention    Graphic Expression  WNL  -        Biographical Information  name;WNL  -           Oral Motor Structure and Function    Oral Motor Structure and Function  WNL  -           Motor Speech Assessment/Intervention    Motor Speech Function  WNL  -           Cursory Voice Assessment/Intervention    Quality and Resonance (Voice)  WNL  -           Cognitive Assessment Intervention- SLP    Cognitive Function (Cognition)  WNL  -        Orientation Status (Cognition)  awareness of basic personal information;person;place;time;situation;WNL  -        Memory  (Cognitive)  simple;functional;immediate;short-term;long-term;WNL  -        Attention (Cognitive)  WNL  -        Thought Organization (Cognitive)  WNL  -        Reasoning (Cognitive)  simple;deductive;WNL  -        Problem Solving (Cognitive)  simple;temporal;WNL  -        Functional Math (Cognitive)  simple;word problems;WNL  -        Executive Function (Cognition)  WNL  -        Pragmatics (Communication)  WNL  -           SLP Clinical Impressions    SLP Diagnosis  No deficits identified with speech, language or cognition at this time  -        SLC Criteria for Skilled Therapy Interventions Met  no problems identified which require skilled intervention;baseline status  -        Functional Impact  no impact on function  -           Recommendations    Therapy Frequency (SLP SLC)  evaluation only  -        Anticipated Discharge Disposition (SLP)  home  -           SLP Discharge Summary    Discharge Destination  unknown  -        Discharge Diagnostic Statement  No deficits identified with speech, language or cognition at this time  -        Progress Toward Achieving Short/long Term Goals  discharge on same date as initial evaluation  -        Reason for Discharge  all goals and outcomes met, no further needs identified  -          User Key  (r) = Recorded By, (t) = Taken By, (c) = Cosigned By    Initials Name Effective Dates     Damaris Coffey, MS CCC-SLP 08/09/20 -            EDUCATION  The patient has been educated in the following areas:   Cognitive Impairment Communication Impairment.      SLP Recommendation and Plan  SLP Diagnosis: No deficits identified with speech, language or cognition at this time     SLC Criteria for Skilled Therapy Interventions Met: no problems identified which require skilled intervention, baseline status  Anticipated Discharge Disposition (SLP): home                    Reason for Discharge: all goals and outcomes met, no further needs identified                    Time Calculation:   Time Calculation- SLP     Row Name 10/15/20 1111             Time Calculation- SLP    SLP Start Time  1030  -CH      SLP Received On  10/15/20  -        User Key  (r) = Recorded By, (t) = Taken By, (c) = Cosigned By    Initials Name Provider Type    Damaris Norman MS CCC-SLP Speech and Language Pathologist          Therapy Charges for Today     Code Description Service Date Service Provider Modifiers Qty    62644724726 HC ST EVAL SPEECH AND PROD W LANG  3 10/15/2020 Damaris Coffey MS CCC-SLP GN 1                   SLP Discharge Summary  Anticipated Discharge Disposition (SLP): home  Reason for Discharge: all goals and outcomes met, no further needs identified  Progress Toward Achieving Short/long Term Goals: discharge on same date as initial evaluation  Discharge Destination: unknown    Damaris Coffey MS CCC-SLP  10/15/2020     Patient was not wearing a face mask and did not exhibit coughing during this therapy encounter.  Procedure performed was aerosolizing, involved close contact (within 6 feet for at least 15 minutes or longer), and did not involve contact with infectious secretions or specimens.  Therapist used appropriate personal protective equipment including gloves, standard procedure mask and eye protection.  Appropriate PPE was worn during the entire therapy session.  Hand hygiene was completed before and after therapy session.

## 2020-10-16 ENCOUNTER — APPOINTMENT (OUTPATIENT)
Dept: CARDIOLOGY | Facility: HOSPITAL | Age: 75
End: 2020-10-16

## 2020-10-16 ENCOUNTER — APPOINTMENT (OUTPATIENT)
Dept: NEUROLOGY | Facility: HOSPITAL | Age: 75
End: 2020-10-16

## 2020-10-16 LAB
BH CV ECHO MEAS - BSA(HAYCOCK): 1.9 M^2
BH CV ECHO MEAS - BSA: 1.9 M^2
BH CV ECHO MEAS - BZI_BMI: 28 KILOGRAMS/M^2
BH CV ECHO MEAS - BZI_METRIC_HEIGHT: 167 CM
BH CV ECHO MEAS - BZI_METRIC_WEIGHT: 78 KG
BH CV VAS BP LEFT ARM: NORMAL MMHG

## 2020-10-16 PROCEDURE — 25010000002 MIDAZOLAM PER 1 MG: Performed by: INTERNAL MEDICINE

## 2020-10-16 PROCEDURE — 95816 EEG AWAKE AND DROWSY: CPT

## 2020-10-16 PROCEDURE — 99233 SBSQ HOSP IP/OBS HIGH 50: CPT | Performed by: STUDENT IN AN ORGANIZED HEALTH CARE EDUCATION/TRAINING PROGRAM

## 2020-10-16 PROCEDURE — 93312 ECHO TRANSESOPHAGEAL: CPT

## 2020-10-16 PROCEDURE — 93325 DOPPLER ECHO COLOR FLOW MAPG: CPT

## 2020-10-16 PROCEDURE — 93312 ECHO TRANSESOPHAGEAL: CPT | Performed by: INTERNAL MEDICINE

## 2020-10-16 PROCEDURE — 93320 DOPPLER ECHO COMPLETE: CPT

## 2020-10-16 PROCEDURE — 93320 DOPPLER ECHO COMPLETE: CPT | Performed by: INTERNAL MEDICINE

## 2020-10-16 PROCEDURE — 99232 SBSQ HOSP IP/OBS MODERATE 35: CPT | Performed by: INTERNAL MEDICINE

## 2020-10-16 PROCEDURE — 99152 MOD SED SAME PHYS/QHP 5/>YRS: CPT

## 2020-10-16 PROCEDURE — 93325 DOPPLER ECHO COLOR FLOW MAPG: CPT | Performed by: INTERNAL MEDICINE

## 2020-10-16 RX ORDER — MIDAZOLAM HYDROCHLORIDE 1 MG/ML
INJECTION INTRAMUSCULAR; INTRAVENOUS
Status: COMPLETED | OUTPATIENT
Start: 2020-10-16 | End: 2020-10-16

## 2020-10-16 RX ADMIN — SODIUM CHLORIDE, PRESERVATIVE FREE 10 ML: 5 INJECTION INTRAVENOUS at 21:06

## 2020-10-16 RX ADMIN — MIDAZOLAM 2 MG: 1 INJECTION INTRAMUSCULAR; INTRAVENOUS at 15:07

## 2020-10-16 RX ADMIN — SODIUM CHLORIDE 75 ML/HR: 9 INJECTION, SOLUTION INTRAVENOUS at 05:52

## 2020-10-16 RX ADMIN — ASPIRIN 325 MG ORAL TABLET 325 MG: 325 PILL ORAL at 17:53

## 2020-10-16 RX ADMIN — AMLODIPINE BESYLATE 10 MG: 10 TABLET ORAL at 17:54

## 2020-10-16 RX ADMIN — PANTOPRAZOLE SODIUM 40 MG: 40 TABLET, DELAYED RELEASE ORAL at 05:52

## 2020-10-16 RX ADMIN — ANASTROZOLE 1 MG: 1 TABLET, COATED ORAL at 18:28

## 2020-10-16 RX ADMIN — METHOHEXITAL SODIUM 30 MG: 500 INJECTION, POWDER, LYOPHILIZED, FOR SOLUTION INTRAMUSCULAR; INTRAVENOUS; RECTAL at 15:08

## 2020-10-16 RX ADMIN — LOSARTAN POTASSIUM 100 MG: 50 TABLET, FILM COATED ORAL at 17:54

## 2020-10-16 RX ADMIN — BUSPIRONE HYDROCHLORIDE 5 MG: 10 TABLET ORAL at 17:53

## 2020-10-16 RX ADMIN — PANTOPRAZOLE SODIUM 40 MG: 40 TABLET, DELAYED RELEASE ORAL at 17:53

## 2020-10-16 RX ADMIN — MIDAZOLAM 2 MG: 1 INJECTION INTRAMUSCULAR; INTRAVENOUS at 15:09

## 2020-10-16 RX ADMIN — CLOPIDOGREL BISULFATE 75 MG: 75 TABLET ORAL at 17:54

## 2020-10-16 RX ADMIN — ATORVASTATIN CALCIUM 80 MG: 40 TABLET, FILM COATED ORAL at 21:05

## 2020-10-16 NOTE — PROGRESS NOTES
[facility] Medicine Services  PROGRESS NOTE    Patient Name: Yumi Crooks  : 1945  MRN: [id number]    Date of Admission: 10/14/2020  Primary Care Physician: Sofia Oquendo APRN    Subjective   Subjective     CC:  Patient was transferred from outside hospital for CVA evaluation.    HPI:  Resting in bed in no acute distress and feels comfortable.  No fever or chills.  No chest pain or palpitation or shortness of breath at rest.  No nausea, vomiting, diarrhea, abdominal pain.    Review of Systems      Objective   Objective     Vital Signs:   Temp:  [98.2 °F (36.8 °C)-98.6 °F (37 °C)] 98.5 °F (36.9 °C)  Heart Rate:  [52-76] 60  Resp:  [16-24] 20  BP: (136-202)/() 152/85  Total (NIH Stroke Scale): 1     Physical Exam:  Constitutional: No acute distress, looks comfortable  HENT: NCAT, mucous membranes moist  Respiratory: Clear to auscultation bilaterally, respiratory effort normal   Cardiovascular: RRR, no murmurs, rubs, or gallops.  Gastrointestinal: Positive bowel sounds, soft, nontender, nondistended  Musculoskeletal: No bilateral ankle edema  Psychiatric: Appropriate affect, cooperative  Neurologic: Oriented x 3, strength symmetric in all extremities, Cranial Nerves grossly intact to confrontation, speech clear  Skin: No rashes    Results Reviewed:  Results from last 7 days   Lab Units 10/15/20  0332 10/15/20  033   WBC 10*3/mm3  --  8.07   HEMOGLOBIN g/dL  --  13.3   HEMATOCRIT %  --  40.3   PLATELETS 10*3/mm3  --  267   INR  1.09  --      Results from last 7 days   Lab Units 10/15/20  0331   SODIUM mmol/L 141   POTASSIUM mmol/L 3.8   CHLORIDE mmol/L 108*   CO2 mmol/L 21.0*   BUN mg/dL 21   CREATININE mg/dL 0.88   GLUCOSE mg/dL 106*   CALCIUM mg/dL 9.6   ALT (SGPT) U/L 21   AST (SGOT) U/L 25     Estimated Creatinine Clearance: 58.2 mL/min (by C-G formula based on SCr of 0.88 mg/dL).    Microbiology Results Abnormal     Procedure Component Value - Date/Time    COVID PRE-OP  / PRE-PROCEDURE SCREENING ORDER (NO ISOLATION) - Swab, Nasal Cavity [[id number]]  (Normal) Collected: 10/15/20 1540    Lab Status: Final result Specimen: Swab from Nasal Cavity Updated: 10/15/20 1637    Narrative:      The following orders were created for panel order COVID PRE-OP / PRE-PROCEDURE SCREENING ORDER (NO ISOLATION) - Swab, Nasal Cavity.  Procedure                               Abnormality         Status                     ---------                               -----------         ------                     COVID-19, ABBOTT IN-HOUS...[[id number]]  Normal              Final result                 Please view results for these tests on the individual orders.    COVID-19, ABBOTT IN-HOUSE,NP Swab (NO TRANSPORT MEDIA) 2 HR TAT - Swab, Nasal Cavity [[id number]]  (Normal) Collected: 10/15/20 1540    Lab Status: Final result Specimen: Swab from Nasal Cavity Updated: 10/15/20 1637     COVID19 Not Detected    Narrative:      Fact sheet for providers: https://www.fda.gov/media/392003/download     Fact sheet for patients: https://www.fda.gov/media/907281/download          Imaging Results (Last 24 Hours)     ** No results found for the last 24 hours. **          Results for orders placed during the hospital encounter of 10/14/20   Adult Transesophageal Echo (VIDAL) W/ Cont if Necessary Per Protocol    Narrative · Left ventricular ejection fraction appears to be 61 - 65%. Left   ventricular systolic function is normal.  · Left ventricular wall thickness is consistent with concentric   hypertrophy.  · No evidence of a left atrial appendage thrombus  · Saline test results are negative for right to left atrial level shunt.          I have reviewed the medications:  Scheduled Meds:amLODIPine, 10 mg, Oral, Daily  anastrozole, 1 mg, Oral, Daily  aspirin, 325 mg, Oral, Daily    Or  aspirin, 300 mg, Rectal, Daily  atorvastatin, 80 mg, Oral, Nightly  busPIRone, 5 mg, Oral, Daily  clopidogrel, 75 mg, Oral, Daily  losartan, 100 mg,  Oral, Q24H  pantoprazole, 40 mg, Oral, QAM  sodium chloride, 10 mL, Intravenous, Q12H  sodium chloride, 10 mL, Intravenous, Q12H      Continuous Infusions:niCARdipine, 5-15 mg/hr, Last Rate: Stopped (10/14/20 0697)      PRN Meds:.sodium chloride  •  sodium chloride    Assessment/Plan   Assessment & Plan     Active Hospital Problems    Diagnosis  POA   • HTN (hypertension) [I10]  Unknown   • HLD (hyperlipidemia) [E78.5]  Unknown   • Stroke (CMS/HCC) [I63.9]  Yes      Resolved Hospital Problems   No resolved problems to display.        Brief Hospital Course to date:  Yumi Crooks is a 75 y.o. female with past medical history significant for hypertension, hyperlipidemia, history of breast cancer status post mastectomy and currently on hormone therapy.  Patient was transferred to [facility] for stroke evaluation.  MRI done at the other hospital is significant for acute/subacute CVA.  VIDAL done here is negative for PFO or any left atrial appendage blood clot.  Ejection fraction also is normal.    PLAN:  - continue current care  - home when ok with neurology possibly tomorrow.  - cardiac monitoring      DVT Prophylaxis:        Disposition: home soon.    CODE STATUS:   Code Status and Medical Interventions:   Ordered at: 10/14/20 0175     Level Of Support Discussed With:    Patient     Code Status:    CPR     Medical Interventions (Level of Support Prior to Arrest):    Full       Garo Velazquez MD  10/16/20

## 2020-10-16 NOTE — PROGRESS NOTES
Stroke Progress Note       Chief Complaint:  Vision problems     Subjective    Subjective     Subjective:  Patient had transient right arm and vision fluctuation     Review of Systems   Constitutional: No fatigue  HENT: Negative for nosebleeds and rhinorrhea.    Eyes: Negative for redness.   Respiratory: Negative for cough.    Gastrointestinal: Negative for anal bleeding.   Endocrine: Negative for polydipsia.   Genitourinary: Negative for enuresis and urgency.   Musculoskeletal: Negative for joint swelling.   Neurological: Negative for tremors.   Psychiatric/Behavioral: Negative for hallucinations.     Objective      Temp:  [98.2 °F (36.8 °C)-98.6 °F (37 °C)] 98.5 °F (36.9 °C)  Heart Rate:  [52-67] 56  Resp:  [16-18] 16  BP: (133-169)/(78-94) 156/86    GEN: NAD, pleasant, cooperative  Eyes-show anicteric sclera, moist conjunctiva with no lid lag, no redness  Neck-trachea midline.  There is no thyromegaly.  ENMT-oropharynx clear with moist mucous membranes and good dentition.  Skin-no rash, lesions or ulcers.  Cardiovascular exam-no pedal edema, regular rate and rhythm.  CHEST: No signs of resp distress, on room air  Abdomen-no abdominal distention, nontender.  Psychiatric exam-alert oriented x3 with intact judgment and insight      NEURO    MENTAL STATUS: AAOx3, memory intact, fund of knowledge appropriate    LANG/SPEECH: Naming and repetition intact, fluent, follows 3-step commands    CRANIAL NERVES:      II: Pupils equal and reactive, no RAPD, Right quadrantanopsia     III, IV, VI: EOM intact, no gaze preference or deviation, no nystagmus.      V: normal sensation in V1, V2, and V3 segments bilaterally      VII: no asymmetry, no nasolabial fold flattening      VIII: normal hearing to speech      IX, X: normal palatal elevation, no uvular deviation      XI: 5/5 head turn and 5/5 shoulder shrug bilaterally      XII: midline tongue protrusion    MOTOR:  Normal tone throughout  5/5 muscle power in Rt shoulder  abductors/adductors, elbow flexors/extensors, wrist flexors/extensors, finger abductors/adductors.  5/5 in Rt hip flexors/extensors, knee flexors/extensors, ankle dorsiflexors and plantar flexors.    5/5 muscle power in Lt shoulder abductors/adductors, elbow flexors/extensors, wrist flexors/extensors, finger abductors/adductors.  5/5 in Lt hip flexors/extensors, knee flexors/extensors, ankle dorsiflexors and plantea flexors.    REFLEXES:  no Gonsales's, no clonus    SENSORY:    Normal to light touch all throughout     COORDINATION: Normal finger to nose and heel to shin, no tremor, no dysmetria    STATION: Not assessed due to patient condition    GAIT: Not assessed due to patient condition      Results Review:    I reviewed the patient's new clinical results.    Lab Results (last 24 hours)     Procedure Component Value Units Date/Time    COVID PRE-OP / PRE-PROCEDURE SCREENING ORDER (NO ISOLATION) - Swab, Nasal Cavity [[id number]]  (Normal) Collected: 10/15/20 1540    Specimen: Swab from Nasal Cavity Updated: 10/15/20 1637    Narrative:      The following orders were created for panel order COVID PRE-OP / PRE-PROCEDURE SCREENING ORDER (NO ISOLATION) - Swab, Nasal Cavity.  Procedure                               Abnormality         Status                     ---------                               -----------         ------                     COVID-19, ABBOTT IN-HOUS...[[id number]]  Normal              Final result                 Please view results for these tests on the individual orders.    COVID-19, ABBOTT IN-HOUSE,NP Swab (NO TRANSPORT MEDIA) 2 HR TAT - Swab, Nasal Cavity [[id number]]  (Normal) Collected: 10/15/20 1540    Specimen: Swab from Nasal Cavity Updated: 10/15/20 1637     COVID19 Not Detected    Narrative:      Fact sheet for providers: https://www.fda.gov/media/245492/download     Fact sheet for patients: https://www.fda.gov/media/179956/download    POC Glucose Once [[id number]]  (Abnormal) Collected:  10/15/20 1520    Specimen: Blood Updated: 10/15/20 1522     Glucose 168 mg/dL         Ct Head Without Contrast    Result Date: 10/14/2020  1. Extensive central white matter changes, likely representing chronic small vessel disease. Punctate low-density changes of the right and left basal ganglia, favored to represent small old ischemic lesions. 2. Probable 2.2 cm arachnoid cyst of the medial left temporal lobe, as noted above. 3. No evidence of intracranial hemorrhage, well-defined infarct, or other clearly acute intracranial abnormality.     Note: Exam time is shown as 4:23 PM. Study was reviewed dictated and signed at 4:40 PM.  This report was finalized on 10/14/2020 4:41 PM by Dr. Nabeel Kendrick MD.      Ct Cerebral Perfusion With & Without Contrast    Result Date: 10/14/2020  1. Negative RAPID study for significant ischemia or infarct. 2. Individual perfusion maps suggest slow flow centered in the medial left parietal lobe, perhaps in the posterior left MCA/PCA watershed territory.  D:  10/14/2020 E:  10/14/2020    This report was finalized on 10/14/2020 8:36 PM by Dr. Nabeel Kendrick MD.            Assessment/Plan     Assessment/Plan:    75 right-handed white female with a history of breast cancer status post mastectomy 3 years ago, currently taking hormone supplement, hypertension, hyperlipidemia had subacute onset of symptoms including right arm numbness, vision and memory issues.           Diagnostics- I personally reviewed all the imaging and labs  -CT head-no acute abnormality, chronic white matter changes, arachnoid cyst in the left temporal lobe.  -CT head and neck-intracranial atherosclerosis, questionable right V4 occlusion/ending in PICA, right before atherosclerosis, left P1 P2 junction occlusion.  -MRI brain-  infarcts in the left splenium of corpus callosum and left occipital lobe.   -Transesophageal echo pending  -143 LDL, triglycerides 163, cholesterol 203, A1c 5.7       1a. Acute ischemic stroke, left  PCA, likely etiology small vessel versus cardio embolic  We will plan to get a transesophageal echo, cardiac event monitor at discharge  1b. Stroke secondary prevention-aspirin Plavix for 90 days followed by aspirin monotherapy.  1c. Stroke recovery- Activity, PT/OT/Speech- full consult  1d. Stroke education- Educated on modifiable stroke risk factors.                  #2 Hypertension-normalize blood pressure goals  #4 Hyperlipidemia- LDL goal less than < 70. Start high intensity statin    Plan  -If VIDAL is negative, recommend event monitor at discharge with DAPT for 90 days followed by aspirin monotherapy                 Zach Jim MD  10/16/20  11:36 EDT

## 2020-10-16 NOTE — PROGRESS NOTES
Continued Stay Note  Psychiatric     Patient Name: Yumi Crooks  MRN: [id number]  Today's Date: 10/16/2020    Admit Date: 10/14/2020    Discharge Plan     Row Name 10/16/20 1409       Plan    Plan  Home    Plan Comments  No current discharge planning needs identified. Patient plans home with spouse.    Final Discharge Disposition Code  01 - home or self-care        Discharge Codes    No documentation.       Expected Discharge Date and Time     Expected Discharge Date Expected Discharge Time    Oct 18, 2020             Kallie Rasmussen RN

## 2020-10-17 VITALS
DIASTOLIC BLOOD PRESSURE: 89 MMHG | BODY MASS INDEX: 27.64 KG/M2 | OXYGEN SATURATION: 97 % | HEIGHT: 66 IN | SYSTOLIC BLOOD PRESSURE: 147 MMHG | RESPIRATION RATE: 16 BRPM | TEMPERATURE: 98.2 F | WEIGHT: 172 LBS | HEART RATE: 68 BPM

## 2020-10-17 PROCEDURE — 93010 ELECTROCARDIOGRAM REPORT: CPT | Performed by: INTERNAL MEDICINE

## 2020-10-17 PROCEDURE — 99231 SBSQ HOSP IP/OBS SF/LOW 25: CPT | Performed by: NURSE PRACTITIONER

## 2020-10-17 PROCEDURE — 99239 HOSP IP/OBS DSCHRG MGMT >30: CPT | Performed by: INTERNAL MEDICINE

## 2020-10-17 PROCEDURE — 93005 ELECTROCARDIOGRAM TRACING: CPT | Performed by: INTERNAL MEDICINE

## 2020-10-17 RX ORDER — ATORVASTATIN CALCIUM 80 MG/1
80 TABLET, FILM COATED ORAL NIGHTLY
Qty: 30 TABLET | Refills: 0 | Status: SHIPPED | OUTPATIENT
Start: 2020-10-17 | End: 2020-11-09

## 2020-10-17 RX ORDER — CLOPIDOGREL BISULFATE 75 MG/1
75 TABLET ORAL DAILY
Qty: 30 TABLET | Refills: 1 | Status: SHIPPED | OUTPATIENT
Start: 2020-10-18 | End: 2020-11-09 | Stop reason: SDUPTHER

## 2020-10-17 RX ADMIN — PANTOPRAZOLE SODIUM 40 MG: 40 TABLET, DELAYED RELEASE ORAL at 08:46

## 2020-10-17 RX ADMIN — SODIUM CHLORIDE, PRESERVATIVE FREE 10 ML: 5 INJECTION INTRAVENOUS at 08:46

## 2020-10-17 RX ADMIN — CLOPIDOGREL BISULFATE 75 MG: 75 TABLET ORAL at 08:46

## 2020-10-17 RX ADMIN — BUSPIRONE HYDROCHLORIDE 5 MG: 10 TABLET ORAL at 08:45

## 2020-10-17 RX ADMIN — ASPIRIN 325 MG ORAL TABLET 325 MG: 325 PILL ORAL at 08:46

## 2020-10-17 RX ADMIN — ANASTROZOLE 1 MG: 1 TABLET, COATED ORAL at 08:45

## 2020-10-17 RX ADMIN — LOSARTAN POTASSIUM 100 MG: 50 TABLET, FILM COATED ORAL at 08:46

## 2020-10-17 RX ADMIN — AMLODIPINE BESYLATE 10 MG: 10 TABLET ORAL at 08:46

## 2020-10-17 NOTE — PROGRESS NOTES
Stroke Progress Note       Chief Complaint:  Vision problems     Subjective    Subjective     Subjective:  Patient had transient right arm and vision fluctuation. Denies any vision disturbances currently. No acute events overnight, wants to go home today.     Review of Systems   Constitutional: No fatigue  HENT: Negative for nosebleeds and rhinorrhea.    Eyes: Negative for redness.   Respiratory: Negative for cough.    Gastrointestinal: Negative for anal bleeding.   Endocrine: Negative for polydipsia.   Genitourinary: Negative for enuresis and urgency.   Musculoskeletal: Negative for joint swelling.   Neurological: Negative for tremors.   Psychiatric/Behavioral: Negative for hallucinations.     Objective      Temp:  [98.1 °F (36.7 °C)-98.5 °F (36.9 °C)] 98.3 °F (36.8 °C)  Heart Rate:  [51-76] 51  Resp:  [16-24] 16  BP: (136-202)/() 176/78    GEN: NAD, pleasant, cooperative  Eyes-show anicteric sclera, moist conjunctiva with no lid lag, no redness  Neck-trachea midline.  There is no thyromegaly.  ENMT-oropharynx clear with moist mucous membranes and good dentition.  Skin-no rash, lesions or ulcers.  Cardiovascular exam-no pedal edema, regular rate and rhythm.  CHEST: No signs of resp distress, on room air  Abdomen-no abdominal distention, nontender.  Psychiatric exam-alert oriented x3 with intact judgment and insight      NEURO    MENTAL STATUS: AAOx3, memory intact, fund of knowledge appropriate    LANG/SPEECH: Naming and repetition intact, fluent, follows 3-step commands    CRANIAL NERVES:      II: Pupils equal and reactive, no RAPD, no visual field deficits    III, IV, VI: EOM intact, no gaze preference or deviation, no nystagmus.      V: normal sensation in V1, V2, and V3 segments bilaterally      VII: no asymmetry, no nasolabial fold flattening      VIII: normal hearing to speech      IX, X: normal palatal elevation, no uvular deviation      XI: 5/5 head turn and 5/5 shoulder shrug bilaterally      XII:  midline tongue protrusion    MOTOR:  Normal tone throughout  Strength 5/5 throughout    SENSORY:    Normal to light touch all throughout     COORDINATION: Normal finger to nose and heel to shin, no tremor, no dysmetria    STATION: Not assessed due to patient condition    GAIT: Not assessed due to patient condition      Results Review:    I reviewed the patient's new clinical results.    Lab Results (last 24 hours)     ** No results found for the last 24 hours. **        No radiology results for the last day        Assessment/Plan     Assessment/Plan:    75 right-handed white female with a history of breast cancer status post mastectomy 3 years ago, currently taking hormone supplement, hypertension, hyperlipidemia had subacute onset of symptoms including right arm numbness, vision and memory issues.           Diagnostics- I personally reviewed all the imaging and labs  -CT head-no acute abnormality, chronic white matter changes, arachnoid cyst in the left temporal lobe.  -CT head and neck-intracranial atherosclerosis, questionable right V4 occlusion/ending in PICA, right before atherosclerosis, left P1 P2 junction occlusion.  -MRI brain-  infarcts in the left splenium of corpus callosum and left occipital lobe.   -Transesophageal echo negative for PFO or atrial appendage  -143 LDL, triglycerides 163, cholesterol 203, A1c 5.7       1a. Acute ischemic stroke, left PCA, likely etiology small vessel vs cardioembolic  VIDAL negative. Will order cardiac event monitor at discharge  1b. Stroke secondary prevention-aspirin Plavix for 90 days followed by aspirin monotherapy.  1c. Stroke recovery- Activity, PT/OT/Speech  1d. Stroke education- Educated on modifiable stroke risk factors.                  #2 Hypertension-normalize blood pressure goals. Discussed with patient the importance of BP control <140/90, fu with PCP closely    #4 Hyperlipidemia- LDL goal less than < 70. Cont high intensity statin. Recommended to pt recheck  in 3-6 months with PCP    Ok to discharge today. Followup with outpatient Neurology in 4-6 weeks.                  Shanna Mcconnell, DIONY  10/17/20  08:32 EDT

## 2020-10-17 NOTE — DISCHARGE SUMMARY
[facility] Medicine Services  DISCHARGE SUMMARY    Patient Name: Yumi Crooks  : 1945  MRN: [id number]    Date of Admission: 10/14/2020  3:57 PM  Date of Discharge:  10/17/20  Primary Care Physician: Sofia Oquendo APRN    Consults     Date and Time Order Name Status Description    10/17/2020 0032 Inpatient Cardiology Consult      10/14/2020 1721 Inpatient Neurology Consult Stroke Completed           Hospital Course     Presenting Problem:   Stroke (CMS/HCC) [I63.9]    Active Hospital Problems    Diagnosis  POA   • HTN (hypertension) [I10]  Unknown   • HLD (hyperlipidemia) [E78.5]  Unknown   • Stroke (CMS/HCC) [I63.9]  Yes      Resolved Hospital Problems   No resolved problems to display.          Hospital Course:  Yumi Crooks is a 75 y.o. female  with past medical history significant for hypertension, hyperlipidemia, history of breast cancer status post mastectomy and currently on hormone therapy.  Patient was transferred to [facility] for stroke evaluation.  MRI done at the other hospital is significant for acute/subacute CVA.   Initial evaluation here included a CBC which was basically normal study.  Compressive metabolic panel showed glucose of 116 BUN/creatinine sodium potassium and liver enzymes were all within normal limits.  Patient was admitted to telemetry and neurology was consulted.  Neurology saw evaluated and followed the patient while the patient was in the hospital.  Patient was put on aspirin and Plavix.  Home medication for blood pressure control also was resumed.  Patient also was put on high-dose Lipitor.  Fasting lipid was also measured which showed total cholesterol 203 triglyceride 163 HDL 30 and .  I talked to the patient in detail about lowering cholesterol.  Hemoglobin A1c also was measured which was elevated at 5.70.     VIDAL done here is negative for PFO or any left atrial appendage blood clot.  Ejection fraction also is normal.   Patient was monitored here for possible rhythm problem.  However, no atrial fibrillation was documented here.  We will send the patient home with a cardiac monitor which will be mailed to the patient by cardiology.  Currently patient is hemodynamically stable and very very eager to go home as her  is sick at home and she is the caregiver.  Patient is to follow-up with the primary care physician within 1 week and also follow-up with the neurology as per schedule.      Discharge Follow Up Recommendations for outpatient labs/diagnostics:       Day of Discharge     HPI:   Earlier this morning patient had an episode of feeling weak but the episode resolved after she lied down.  Currently resting in bed in no acute distress.  No fever or chills.  No chest pain, palpitation, shortness of breath at rest.  No nausea, vomiting, diarrhea, abdominal pain.    Review of Systems      Vital Signs:   Temp:  [98.1 °F (36.7 °C)-98.3 °F (36.8 °C)] 98.2 °F (36.8 °C)  Heart Rate:  [51-76] 68  Resp:  [16-24] 16  BP: (136-202)/() 147/89     Physical Exam:  Constitutional: No acute distress, looks comfortable  HENT: NCAT, mucous membranes moist  Respiratory: Clear to auscultation bilaterally, respiratory effort normal   Cardiovascular: RRR, no murmurs, rubs, or gallops.  Gastrointestinal: Positive bowel sounds, soft, nontender, nondistended  Musculoskeletal: No bilateral ankle edema  Psychiatric: Appropriate affect, cooperative  Neurologic: Oriented x 3, strength symmetric in all extremities, Cranial Nerves grossly intact to confrontation, speech clear  Skin: No rashes    Pertinent  and/or Most Recent Results     Results from last 7 days   Lab Units 10/15/20  0331   WBC 10*3/mm3 8.07   HEMOGLOBIN g/dL 13.3   HEMATOCRIT % 40.3   PLATELETS 10*3/mm3 267   SODIUM mmol/L 141   POTASSIUM mmol/L 3.8   CHLORIDE mmol/L 108*   CO2 mmol/L 21.0*   BUN mg/dL 21   CREATININE mg/dL 0.88   GLUCOSE mg/dL 106*   CALCIUM mg/dL 9.6     Results  from last 7 days   Lab Units 10/15/20  0332 10/15/20  0331   BILIRUBIN mg/dL  --  0.2   ALK PHOS U/L  --  112   ALT (SGPT) U/L  --  21   AST (SGOT) U/L  --  25   PROTIME Seconds 13.9  --    INR  1.09  --      Results from last 7 days   Lab Units 10/15/20  0331   CHOLESTEROL mg/dL 203*   TRIGLYCERIDES mg/dL 163*   HDL CHOL mg/dL 30*     Results from last 7 days   Lab Units 10/15/20  0331   HEMOGLOBIN A1C % 5.70*       Brief Urine Lab Results     None          Microbiology Results Abnormal     Procedure Component Value - Date/Time    COVID PRE-OP / PRE-PROCEDURE SCREENING ORDER (NO ISOLATION) - Swab, Nasal Cavity [[id number]]  (Normal) Collected: 10/15/20 1540    Lab Status: Final result Specimen: Swab from Nasal Cavity Updated: 10/15/20 1637    Narrative:      The following orders were created for panel order COVID PRE-OP / PRE-PROCEDURE SCREENING ORDER (NO ISOLATION) - Swab, Nasal Cavity.  Procedure                               Abnormality         Status                     ---------                               -----------         ------                     COVID-19, ABBOTT IN-HOUS...[[id number]]  Normal              Final result                 Please view results for these tests on the individual orders.    COVID-19, ABBOTT IN-HOUSE,NP Swab (NO TRANSPORT MEDIA) 2 HR TAT - Swab, Nasal Cavity [[id number]]  (Normal) Collected: 10/15/20 1540    Lab Status: Final result Specimen: Swab from Nasal Cavity Updated: 10/15/20 1637     COVID19 Not Detected    Narrative:      Fact sheet for providers: https://www.fda.gov/media/800788/download     Fact sheet for patients: https://www.fda.gov/media/072858/download          Imaging Results (All)     Procedure Component Value Units Date/Time    XR Outside Films [[id number]] Resulted: 10/15/20 0936     Updated: 10/15/20 0936    Narrative:      This procedure was auto-finalized with no dictation required.    CT Outside Films [[id number]] Resulted: 10/15/20 0931     Updated: 10/15/20  0931    Narrative:      This procedure was auto-finalized with no dictation required.    CT Cerebral Perfusion With & Without Contrast [[id number]] Collected: 10/14/20 1643     Updated: 10/14/20 2039    Narrative:      EXAMINATION: CT CEREBRAL PERFUSION W WO CONTRAST-10/14/2020:      INDICATION: Transient ischemic attack (TIA); Z00.6-Encounter for  examination for normal comparison and control in clinical research  program.     TECHNIQUE: Cerebral perfusion analysis was performed using computed  tomography with IV contrast administration, including postprocessing of  parametric maps with determination of cerebral blood flow, cerebral  blood volume, mean transit time and time to drain.     The radiation dose reduction device was turned on for each scan per the  ALARA (As Low as Reasonably Achievable) protocol.     COMPARISON: Unenhanced CT scan of same day.     FINDINGS: RAPID analysis shows no areas of the brain with cerebral blood  flow less than 30% or T-Max greater than six seconds. Individual  perfusion maps, however, show a roughly 7 x 3 cm area of increased time  to drain, mean transit time and T-Max in the left medial parietal lobe,  perhaps extending into the occipital lobe. There is mild, if any  asymmetry on cerebral blood flow images, and cerebral blood volume  images appear appear essentially normal. This suggests possibility of  left posterior cerebral artery territory slow flow, without apparent  ischemia or infarct.       Impression:      1. Negative RAPID study for significant ischemia or infarct.  2. Individual perfusion maps suggest slow flow centered in the medial  left parietal lobe, perhaps in the posterior left MCA/PCA watershed  territory.     D:  10/14/2020  E:  10/14/2020           This report was finalized on 10/14/2020 8:36 PM by Dr. Nabeel Kendrick MD.       CT Head Without Contrast [[id number]] Collected: 10/14/20 1636     Updated: 10/14/20 1644    Narrative:      EXAMINATION: CT HEAD WO  CONTRAST-      INDICATION: stroke; Z00.6-Encounter for examination for normal  comparison and control in clinical research program     TECHNIQUE: 5 mm unenhanced images through the brain     The radiation dose reduction device was turned on for each scan per the  ALARA (As Low as Reasonably Achievable) protocol.     COMPARISON: NONE     FINDINGS: Patient history indicates stroke, right arm numbness.     The calvarium appears intact. Included paranasal sinuses and mastoids  appear clear. Soft tissue window images show expected degree of  generalized cervical atrophy for age. There is extensive central white  matter change, typical of microvascular leukoencephalopathy.  Low-attenuation changes in both right and left basal ganglia may  represent chronic ischemic foci. There is a sharply defined CSF density  2.2 cm lesion of the medial left temporal lobe is in the usual location  of a normal variant dilated perivascular space, although larger than  typically seen, possibly an arachnoid cyst, but likely benign in any  event. No similar findings are identified elsewhere. There is no  apparent well-defined edema/infarct, no evidence of hemorrhage,  hydrocephalus, soft tissue mass or significant mass effect, or abnormal  extra-axial collection.                   Impression:      1. Extensive central white matter changes, likely representing chronic  small vessel disease. Punctate low-density changes of the right and left  basal ganglia, favored to represent small old ischemic lesions.  2. Probable 2.2 cm arachnoid cyst of the medial left temporal lobe, as  noted above.  3. No evidence of intracranial hemorrhage, well-defined infarct, or  other clearly acute intracranial abnormality.              Note: Exam time is shown as 4:23 PM. Study was reviewed dictated and  signed at 4:40 PM.     This report was finalized on 10/14/2020 4:41 PM by Dr. Nabeel Kendrick MD.       MRI Outside Films [[id number]] Resulted: 10/14/20 6294      Updated: 10/14/20 1632    Narrative:      This procedure was auto-finalized with no dictation required.    CT Outside Films [[id number]] Resulted: 10/14/20 1630     Updated: 10/14/20 1630    Narrative:      This procedure was auto-finalized with no dictation required.    CT Outside Films [[id number]] Resulted: 10/14/20 1629     Updated: 10/14/20 1629    Narrative:      This procedure was auto-finalized with no dictation required.                    Results for orders placed during the hospital encounter of 10/14/20   Adult Transesophageal Echo (VIDAL) W/ Cont if Necessary Per Protocol    Narrative · Left ventricular ejection fraction appears to be 61 - 65%. Left   ventricular systolic function is normal.  · Left ventricular wall thickness is consistent with concentric   hypertrophy.  · No evidence of a left atrial appendage thrombus  · Saline test results are negative for right to left atrial level shunt.              Discharge Details        Discharge Medications      New Medications      Instructions Start Date   atorvastatin 80 MG tablet  Commonly known as: LIPITOR   80 mg, Oral, Nightly      clopidogrel 75 MG tablet  Commonly known as: PLAVIX   75 mg, Oral, Daily   Start Date: October 18, 2020        Continue These Medications      Instructions Start Date   amLODIPine 10 MG tablet  Commonly known as: NORVASC   10 mg, Oral, Daily      anastrozole 1 MG tablet  Commonly known as: ARIMIDEX   1 mg, Oral, Daily      aspirin 81 MG chewable tablet   81 mg, Oral, Daily      busPIRone 5 MG tablet  Commonly known as: BUSPAR   5 mg, Oral, Daily      gemfibrozil 600 MG tablet  Commonly known as: LOPID   600 mg, Oral, 2 Times Daily Before Meals      losartan-hydrochlorothiazide 100-25 MG per tablet  Commonly known as: HYZAAR   1 tablet, Oral, Daily      multivitamin (eye vitamin) tablet tablet   1 tablet, Oral, Daily      omeprazole 40 MG capsule  Commonly known as: priLOSEC   40 mg, Oral, Daily             No Known  Allergies      Discharge Disposition:  Home or Self Care    Diet:  Hospital:  Diet Order   Procedures   • Diet Regular; Cardiac       Activity:  Activity Instructions     Activity as Tolerated                   CODE STATUS:    Code Status and Medical Interventions:   Ordered at: 10/14/20 1956     Level Of Support Discussed With:    Patient     Code Status:    CPR     Medical Interventions (Level of Support Prior to Arrest):    Full       No future appointments.    Additional Instructions for the Follow-ups that You Need to Schedule     Discharge Follow-up with PCP   As directed       Currently Documented PCP:    Sofia Oquendo APRN    PCP Phone Number:    [phone]     Follow Up Details: within one week         Discharge Follow-up with Specified Provider: neurology as per schedule.   As directed      To: neurology as per schedule.                     Garo Velazquez MD  10/17/20      Time Spent on Discharge:  I spent  45  minutes on this discharge activity which included: face-to-face encounter with the patient, reviewing the data in the system, coordination of the care with the nursing staff as well as consultants, documentation, and entering orders.    Electronically signed by Garo Velazquez MD, 10/17/20, 1:55 PM EDT.

## 2020-10-17 NOTE — DISCHARGE INSTR - ACTIVITY
If you have an episode sit down and rest. Take your blood pressure and check heart rate. Document all symptoms. EVERY symptom. This will help the neurologist and cardiologist a lot! Included in this packet is a blood pressure form you can use to keep track

## 2020-10-18 ENCOUNTER — READMISSION MANAGEMENT (OUTPATIENT)
Dept: CALL CENTER | Facility: HOSPITAL | Age: 75
End: 2020-10-18

## 2020-10-18 NOTE — OUTREACH NOTE
Prep Survey      Responses   [facility] facility patient discharged from?  [facility]   Is LACE score < 7 ?  No   Eligibility  Readm Mgmt   Discharge diagnosis  Stroke    Does the patient have one of the following disease processes/diagnoses(primary or secondary)?  Stroke (TIA)   Does the patient have Home health ordered?  No   Is there a DME ordered?  No   Prep survey completed?  Yes          Jennifer Mcallister RN

## 2020-10-19 ENCOUNTER — TELEPHONE (OUTPATIENT)
Dept: NEUROLOGY | Facility: CLINIC | Age: 75
End: 2020-10-19

## 2020-10-19 NOTE — TELEPHONE ENCOUNTER
New or established patient: NEW  Date of discharge: 10-17-20  Facility discharged from: [facility]  Diagnosis/symptoms: STROKE  Length of stay (if applicable): 4 DAYS  Best call back number: [phone], DAUGHTER (OMAR) ANNE.  PLEASE LEAVE VM IF NO ANSWER.    PATIENT WAS SEEN BY DR MONZON IN HOSPITAL, REQUESTED FIVE WEEK FOLLOW UP.  NOTHING AVAILABLE PRIOR TO 01-04-20.    PLEASE CALL TO SCHEDULE APPOINTMENT.

## 2020-10-23 ENCOUNTER — READMISSION MANAGEMENT (OUTPATIENT)
Dept: CALL CENTER | Facility: HOSPITAL | Age: 75
End: 2020-10-23

## 2020-10-23 NOTE — OUTREACH NOTE
Stroke Week 1 Survey      Responses   [facility] patient discharged from?  [place]   Does the patient have one of the following disease processes/diagnoses(primary or secondary)?  Stroke (TIA)   Week 1 attempt successful?  Yes   Call start time  2226   Call end time  1029   Discharge diagnosis  Stroke    Meds reviewed with patient/caregiver?  Yes   Is the patient having any side effects they believe may be caused by any medication additions or changes?  No   Does the patient have all medications ordered at discharge?  Yes   Is the patient taking all medications as directed (includes completed medication regime)?  Yes   Does the patient have a primary care provider?   Yes   Does the patient have an appointment with their PCP within 7 days of discharge?  Yes   Has the patient kept scheduled appointments due by today?  Yes   Has home health visited the patient within 72 hours of discharge?  N/A   Psychosocial issues?  No   Does the patient require any assistance with activities of daily living such as eating, bathing, dressing, walking, etc.?  No   Does the patient have any residual symptoms from stroke/TIA?  No   Does the patient understand the diet ordered at discharge?  Yes   Did the patient receive a copy of their discharge instructions?  Yes   Nursing interventions  Reviewed instructions with patient   What is the patient's perception of their health status since discharge?  Improving   Nursing interventions  Nurse provided patient education   Is the patient able to teach back FAST for Stroke?  Yes   Is the patient/caregiver able to teach back the risk factors for a stroke?  High blood pressure-goal below 120/80, High Cholesterol, Physical inactivity and obesity   Is the patient/caregiver able to teach back signs and symptoms related to disease process for when to call PCP?  Yes   Is the patient/caregiver able to teach back signs and symptoms related to disease process for when to call 911?  Yes   Is the  patient/caregiver able to teach back the hierarchy of who to call/visit for symptoms/problems? PCP, Specialist, Home health nurse, Urgent Care, ED, 911  Yes   Week 1 call completed?  Yes          Jatinder Lockwood RN

## 2020-10-30 ENCOUNTER — READMISSION MANAGEMENT (OUTPATIENT)
Dept: CALL CENTER | Facility: HOSPITAL | Age: 75
End: 2020-10-30

## 2020-11-04 ENCOUNTER — READMISSION MANAGEMENT (OUTPATIENT)
Dept: CALL CENTER | Facility: HOSPITAL | Age: 75
End: 2020-11-04

## 2020-11-04 NOTE — OUTREACH NOTE
Stroke Week 2 Survey      Responses   [facility] patient discharged from?  [place]   Does the patient have one of the following disease processes/diagnoses(primary or secondary)?  Stroke (TIA)   Week 2 attempt successful?  Yes   Call start time  1554   Call end time  1557   Discharge diagnosis  Stroke    Meds reviewed with patient/caregiver?  Yes   Is the patient having any side effects they believe may be caused by any medication additions or changes?  No   Does the patient have all medications ordered at discharge?  Yes   Is the patient taking all medications as directed (includes completed medication regime)?  Yes   Does the patient have an appointment with their PCP within 7 days of discharge?  Yes   Has the patient kept scheduled appointments due by today?  Yes   Psychosocial issues?  No   Does the patient require any assistance with activities of daily living such as eating, bathing, dressing, walking, etc.?  No   Does the patient understand the diet ordered at discharge?  Yes [weak, able to do a little]   Did the patient receive a copy of their discharge instructions?  Yes   Nursing interventions  Reviewed instructions with patient   What is the patient's perception of their health status since discharge?  Same   Nursing interventions  Nurse provided patient education   Is the patient able to teach back FAST for Stroke?  Yes   Is the patient/caregiver able to teach back the risk factors for a stroke?  High blood pressure-goal below 120/80   Is the patient/caregiver able to teach back signs and symptoms related to disease process for when to call PCP?  Yes   Is the patient/caregiver able to teach back signs and symptoms related to disease process for when to call 911?  Yes   If the patient is a current smoker, are they able to teach back resources for cessation?  Not a smoker   Is the patient/caregiver able to teach back the hierarchy of who to call/visit for symptoms/problems? PCP, Specialist, Home  health nurse, Urgent Care, ED, 911  Yes   Week 2 call completed?  Yes   Wrap up additional comments  pt feels better, wearing a holter monitor and will see cardio next week.          Jennifer Mcallister RN

## 2020-11-09 ENCOUNTER — CONSULT (OUTPATIENT)
Dept: CARDIOLOGY | Facility: CLINIC | Age: 75
End: 2020-11-09

## 2020-11-09 VITALS
DIASTOLIC BLOOD PRESSURE: 90 MMHG | TEMPERATURE: 98 F | BODY MASS INDEX: 26.52 KG/M2 | HEIGHT: 66 IN | SYSTOLIC BLOOD PRESSURE: 140 MMHG | WEIGHT: 165 LBS | HEART RATE: 51 BPM

## 2020-11-09 DIAGNOSIS — E78.2 MIXED HYPERLIPIDEMIA: ICD-10-CM

## 2020-11-09 DIAGNOSIS — I10 ESSENTIAL HYPERTENSION: Primary | ICD-10-CM

## 2020-11-09 DIAGNOSIS — I63.9 CEREBROVASCULAR ACCIDENT (CVA), UNSPECIFIED MECHANISM (HCC): ICD-10-CM

## 2020-11-09 PROCEDURE — 99204 OFFICE O/P NEW MOD 45 MIN: CPT | Performed by: INTERNAL MEDICINE

## 2020-11-09 PROCEDURE — 93000 ELECTROCARDIOGRAM COMPLETE: CPT | Performed by: INTERNAL MEDICINE

## 2020-11-09 RX ORDER — CLOPIDOGREL BISULFATE 75 MG/1
75 TABLET ORAL DAILY
Qty: 30 TABLET | Refills: 1 | Status: SHIPPED | OUTPATIENT
Start: 2020-11-09 | End: 2020-11-09 | Stop reason: SDUPTHER

## 2020-11-09 RX ORDER — SIMVASTATIN 10 MG
TABLET ORAL
COMMUNITY
Start: 2020-10-22 | End: 2020-11-09

## 2020-11-09 RX ORDER — ESOMEPRAZOLE MAGNESIUM 40 MG/1
40 CAPSULE, DELAYED RELEASE ORAL
Qty: 30 CAPSULE | Refills: 5 | Status: SHIPPED | OUTPATIENT
Start: 2020-11-09 | End: 2021-03-08

## 2020-11-09 RX ORDER — CLOPIDOGREL BISULFATE 75 MG/1
75 TABLET ORAL DAILY
Qty: 90 TABLET | Refills: 1 | Status: SHIPPED | OUTPATIENT
Start: 2020-11-09 | End: 2021-07-08 | Stop reason: SDUPTHER

## 2020-11-09 RX ORDER — ROSUVASTATIN CALCIUM 10 MG/1
10 TABLET, COATED ORAL DAILY
Qty: 30 TABLET | Refills: 11 | Status: SHIPPED | OUTPATIENT
Start: 2020-11-09 | End: 2021-02-22

## 2020-11-09 NOTE — PROGRESS NOTES
[facility]  Consultation H&P  Yumi Crooks  1945  [phone]  There is no work phone number on file..    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 Left ventricular systolic function is normal.  · Left ventricular wall thickness is consistent with concentric hypertrophy.  · No evidence of a left atrial appendage thrombus  · Saline test results are negative for right to left atrial level shunt.    ASSESSMENT:   Diagnosis Plan   1. Essential hypertension     2. Mixed hyperlipidemia     3. Cerebrovascular accident (CVA), unspecified mechanism (CMS/HCC)           PLAN:  CVA: Upcoming follow-up with neurology colleague.  Would consider dual antiplatelet therapy for a total of 3 weeks status post stroke then down titrate to clopidogrel single therapy.  Switching omeprazole to Nexium to limit drug drug interaction with clopidogrel.  Switching simvastatin to rosuvastatin due to its potency, decrease likelihood induced myalgias and avoid interactions with amlodipine.  Continue to titrate afterload reduction as indicated.  Ambulatory ECG monitor pending, not recommending loop recorder if this is nondiagnostic or negative.    Hypertension: Goal less than 130/80 mmHg.  Continue current medical therapy.  Patient will keep a blood pressure log.    Hyperlipidemia: Goal LDL less than 70.  Switching to rosuvastatin 10 mg p.o. daily.  Discontinuing gemfibrozil to limit drug drug interactions.    History of Present Illness   75-year-old female with a history of  "breast cancer status postmastectomy, on Arimidex, hypertension, dyslipidemia presenting with worsening right arm numbness with a evaluation revealing evidence of multiple small infarcts in the left splenium of the corpus callosum and medial left occipital.  VIDAL unremarkable.  Inpatient telemetry unremarkable.  Ambulatory ECG monitor pending.  She was on aspirin at the time of her stroke.  Complaining of fatigue, nausea, intermittent mild headaches, intermittent episodes of sudden onset dizziness, and intermittent nocturnal leg cramps.  She denies chest pain, dyspnea exertion or palpitations.  Blood pressures running less than 140/90 mmHg.  Atorvastatin elicited myalgias.  She is compliant with medical therapy.    PHYSICAL EXAMINATION:  Vitals:    11/09/20 0808   BP: 140/90   BP Location: Left arm   Patient Position: Sitting   Pulse: 51   Temp: 98 °F (36.7 °C)   Weight: 74.8 kg (165 lb)   Height: 167.6 cm (66\")     General Appearance:    Alert, cooperative, no distress, appears stated age   Head:    Normocephalic, without obvious abnormality, atraumatic   Eyes:    conjunctiva/corneas clear, EOM's intact, fundi     benign, both eyes   Ears:    Normal TM's and external ear canals, both ears   Nose:   Nares normal, septum midline, mucosa normal, no drainage    or sinus tenderness   Throat:   Lips, mucosa, and tongue normal; teeth and gums normal   Neck:   Supple, symmetrical, trachea midline, no adenopathy;     thyroid:  no enlargement/tenderness/nodules; no carotid    bruit or JVD   Back:     Symmetric, no curvature, ROM normal, no CVA tenderness   Lungs:     Clear to auscultation bilaterally, respirations unlabored   Chest Wall:    No tenderness or deformity    Heart:    Regular rate and rhythm, S1 and S2 normal, no murmur, rub   or gallop, normal carotid impulse bilaterally without bruit.   Abdomen:     Soft, non-tender, bowel sounds active all four quadrants,     no masses, no organomegaly   Extremities:   " Extremities normal, atraumatic, no cyanosis or edema   Pulses:   2+ and symmetric all extremities   Skin:   Skin color, texture, turgor normal, no rashes or lesions   Lymph nodes:   Cervical, supraclavicular, and axillary nodes normal   Neurologic:   normal strength, sensation intact     throughout       Diagnostic Data:    ECG 12 Lead    Date/Time: 11/9/2020 8:20 AM  Performed by: Nicanor Vega III, MD  Authorized by: Nicanor Vega III, MD   Comparison: compared with previous ECG from 10/17/2020  Similar to previous ECG  Rhythm: sinus bradycardia    Clinical impression: normal ECG          Lab Results   Component Value Date    TRIG 163 (H) 10/15/2020    HDL 30 (L) 10/15/2020     Lab Results   Component Value Date    GLUCOSE 106 (H) 10/15/2020    BUN 21 10/15/2020    CREATININE 0.88 10/15/2020     10/15/2020    K 3.8 10/15/2020     (H) 10/15/2020    CO2 21.0 (L) 10/15/2020     Lab Results   Component Value Date    HGBA1C 5.70 (H) 10/15/2020     Lab Results   Component Value Date    WBC 8.07 10/15/2020    HGB 13.3 10/15/2020    HCT 40.3 10/15/2020     10/15/2020       PROBLEM LIST:  Patient Active Problem List   Diagnosis   • Stroke (CMS/HCC)   • HTN (hypertension)   • HLD (hyperlipidemia)       PAST MEDICAL HX  Past Medical History:   Diagnosis Date   • Dyslipidemia    • Hypertension        Allergies  Allergies   Allergen Reactions   • Lipitor [Atorvastatin] Myalgia   • Lortab [Hydrocodone-Acetaminophen] Other (See Comments)         • Penicillins Other (See Comments)     Had too much as a kid   • Sulfa Antibiotics Other (See Comments)             Current Medications    Current Outpatient Medications:   •  amLODIPine (NORVASC) 10 MG tablet, Take 10 mg by mouth Daily., Disp: , Rfl:   •  anastrozole (ARIMIDEX) 1 MG tablet, Take 1 mg by mouth Daily., Disp: , Rfl:   •  aspirin 81 MG chewable tablet, Chew 81 mg Daily., Disp: , Rfl:   •  busPIRone (BUSPAR) 5 MG tablet, Take 5 mg by mouth Daily., Disp: ,  Rfl:   •  clopidogrel (PLAVIX) 75 MG tablet, Take 1 tablet by mouth Daily., Disp: 30 tablet, Rfl: 1  •  losartan-hydrochlorothiazide (HYZAAR) 100-25 MG per tablet, Take 1 tablet by mouth Daily., Disp: , Rfl:   •  Multiple Vitamins-Minerals (multivitamin, eye vitamin,) tablet tablet, Take 1 tablet by mouth Daily., Disp: , Rfl:   •  esomeprazole (nexIUM) 40 MG capsule, Take 1 capsule by mouth Every Morning Before Breakfast., Disp: 30 capsule, Rfl: 5  •  rosuvastatin (CRESTOR) 10 MG tablet, Take 1 tablet by mouth Daily., Disp: 30 tablet, Rfl: 11         ROS  Review of Systems   Constitution: Positive for malaise/fatigue and weight gain.   Cardiovascular: Positive for leg swelling.   Respiratory: Positive for shortness of breath.    Gastrointestinal: Positive for change in bowel habit and nausea.   Neurological: Positive for dizziness and headaches.   Psychiatric/Behavioral: Positive for memory loss.       All other body systems reviewed and are negative    SOCIAL HX  Social History     Socioeconomic History   • Marital status:      Spouse name: Not on file   • Number of children: Not on file   • Years of education: Not on file   • Highest education level: Not on file   Tobacco Use   • Smoking status: Never Smoker   • Smokeless tobacco: Never Used       FAMILY HX  Family History   Problem Relation Age of Onset   • Breast cancer Neg Hx    • Ovarian cancer Neg Hx              Nicanor Vega III, MD, FACC

## 2020-11-12 ENCOUNTER — OFFICE VISIT (OUTPATIENT)
Dept: NEUROLOGY | Facility: CLINIC | Age: 75
End: 2020-11-12

## 2020-11-12 ENCOUNTER — LAB REQUISITION (OUTPATIENT)
Dept: LAB | Facility: HOSPITAL | Age: 75
End: 2020-11-12

## 2020-11-12 VITALS
TEMPERATURE: 97.7 F | WEIGHT: 165 LBS | BODY MASS INDEX: 26.52 KG/M2 | OXYGEN SATURATION: 98 % | DIASTOLIC BLOOD PRESSURE: 84 MMHG | HEART RATE: 53 BPM | HEIGHT: 66 IN | SYSTOLIC BLOOD PRESSURE: 130 MMHG

## 2020-11-12 DIAGNOSIS — I63.9 CEREBROVASCULAR ACCIDENT (CVA), UNSPECIFIED MECHANISM (HCC): ICD-10-CM

## 2020-11-12 DIAGNOSIS — M79.10 MYALGIA: Primary | ICD-10-CM

## 2020-11-12 DIAGNOSIS — Z00.00 ROUTINE GENERAL MEDICAL EXAMINATION AT A HEALTH CARE FACILITY: ICD-10-CM

## 2020-11-12 DIAGNOSIS — R53.83 FATIGUE, UNSPECIFIED TYPE: ICD-10-CM

## 2020-11-12 PROCEDURE — 36415 COLL VENOUS BLD VENIPUNCTURE: CPT

## 2020-11-12 PROCEDURE — 99215 OFFICE O/P EST HI 40 MIN: CPT | Performed by: PSYCHIATRY & NEUROLOGY

## 2020-11-13 ENCOUNTER — READMISSION MANAGEMENT (OUTPATIENT)
Dept: CALL CENTER | Facility: HOSPITAL | Age: 75
End: 2020-11-13

## 2020-11-13 LAB
25(OH)D3+25(OH)D2 SERPL-MCNC: 43.8 NG/ML (ref 30–100)
ALBUMIN SERPL-MCNC: 4.8 G/DL (ref 3.5–5.2)
ALBUMIN/GLOB SERPL: 2.3 G/DL
ALP SERPL-CCNC: 111 U/L (ref 39–117)
ALT SERPL-CCNC: 30 U/L (ref 1–33)
AST SERPL-CCNC: 24 U/L (ref 1–32)
BILIRUB SERPL-MCNC: 0.2 MG/DL (ref 0–1.2)
BUN SERPL-MCNC: 30 MG/DL (ref 8–23)
BUN/CREAT SERPL: 26.8 (ref 7–25)
CALCIUM SERPL-MCNC: 9.5 MG/DL (ref 8.6–10.5)
CHLORIDE SERPL-SCNC: 107 MMOL/L (ref 98–107)
CHOLEST SERPL-MCNC: 150 MG/DL (ref 0–200)
CK SERPL-CCNC: 210 U/L (ref 20–180)
CO2 SERPL-SCNC: 20 MMOL/L (ref 22–29)
CREAT SERPL-MCNC: 1.12 MG/DL (ref 0.57–1)
GLOBULIN SER CALC-MCNC: 2.1 GM/DL
GLUCOSE SERPL-MCNC: 114 MG/DL (ref 65–99)
HDLC SERPL-MCNC: 45 MG/DL (ref 40–60)
LDLC SERPL CALC-MCNC: 78 MG/DL (ref 0–100)
POTASSIUM SERPL-SCNC: 3.6 MMOL/L (ref 3.5–5.2)
PROT SERPL-MCNC: 6.9 G/DL (ref 6–8.5)
SODIUM SERPL-SCNC: 142 MMOL/L (ref 136–145)
T4 FREE SERPL-MCNC: 1.12 NG/DL (ref 0.93–1.7)
TRIGL SERPL-MCNC: 154 MG/DL (ref 0–150)
TSH SERPL DL<=0.005 MIU/L-ACNC: 1.21 UIU/ML (ref 0.27–4.2)
VIT B12 SERPL-MCNC: 620 PG/ML (ref 211–946)
VLDLC SERPL CALC-MCNC: 27 MG/DL (ref 5–40)

## 2020-11-13 NOTE — PROGRESS NOTES
Subjective:    CC: Yumi Crooks is seen today in consultation at the request of [facility] for stroke    HPI:  75-year-old female accompanied by her daughter with a history of hypertension, hyperlipidemia, breast cancer status post mastectomy, anxiety, depression, arthritis presents as a hospital follow-up for stroke.  As per patient she had symptoms of blurred vision along with right arm numbness and a headache on 10/14.  She was initially taken to an outside hospital and subsequently transferred here.  Her initial blood pressure was 195/104..  Initial CT scan of the head showed arachnoid cyst in the left temporal lobe but no any acute abnormalities.  She had a CT angiogram of the head and neck that showed right distal vertebral occlusion as well as left P1/P2 occlusion with no significant intracarotid stenosis.  MRI brain showed acute infarcts in the left medial occipital lobe as well as an splenium of corpus callosum.  EEG was normal. VIDAL showed a normal EF with left ventricular concentric hypertrophy but normal left atrial appendage thrombus or PFO.  She was started on dual antiplatelets as well as Lipitor 80 mg daily.  Her lipid panel was as follows-, , , HDL 30.  A1c was 5.7.  Patient could not tolerate Lipitor and she was recently switched to Crestor 10 mg daily by her cardiologist but is unable to tolerate that as well as she is having pain in her legs.  She also feels tired at all times.  Had a Holter monitor after discharge the results of which are still pending.  Her systolic blood pressure at home is still running high in the 140s and 150s.  Of note-I personally reviewed her MRI brain and Dr. Jim's's notes.    The following portions of the patient's history were reviewed today and updated as of 11/12/2020  : allergies, current medications, past family history, past medical history, past social history, past surgical history and problem list  These document will be scanned to  "patient's chart.      Current Outpatient Medications:   •  amLODIPine (NORVASC) 10 MG tablet, Take 10 mg by mouth Daily., Disp: , Rfl:   •  anastrozole (ARIMIDEX) 1 MG tablet, Take 1 mg by mouth Daily., Disp: , Rfl:   •  aspirin 81 MG chewable tablet, Chew 81 mg Daily., Disp: , Rfl:   •  busPIRone (BUSPAR) 5 MG tablet, Take 5 mg by mouth Daily., Disp: , Rfl:   •  clopidogrel (PLAVIX) 75 MG tablet, Take 1 tablet by mouth Daily., Disp: 90 tablet, Rfl: 1  •  esomeprazole (nexIUM) 40 MG capsule, Take 1 capsule by mouth Every Morning Before Breakfast., Disp: 30 capsule, Rfl: 5  •  losartan-hydrochlorothiazide (HYZAAR) 100-25 MG per tablet, Take 1 tablet by mouth Daily., Disp: , Rfl:   •  Multiple Vitamins-Minerals (multivitamin, eye vitamin,) tablet tablet, Take 1 tablet by mouth Daily., Disp: , Rfl:   •  rosuvastatin (CRESTOR) 10 MG tablet, Take 1 tablet by mouth Daily., Disp: 30 tablet, Rfl: 11   Past Medical History:   Diagnosis Date   • Dyslipidemia    • Hypertension       Past Surgical History:   Procedure Laterality Date   • BREAST BIOPSY Right     2015      Family History   Problem Relation Age of Onset   • Breast cancer Neg Hx    • Ovarian cancer Neg Hx       Social History     Socioeconomic History   • Marital status:      Spouse name: Not on file   • Number of children: Not on file   • Years of education: Not on file   • Highest education level: Not on file   Tobacco Use   • Smoking status: Never Smoker   • Smokeless tobacco: Never Used     Review of Systems   Constitutional: Positive for fatigue.   Musculoskeletal: Positive for myalgias.   All other systems reviewed and are negative.      Objective:    /84   Pulse 53   Temp 97.7 °F (36.5 °C) (Temporal)   Ht 167.6 cm (65.98\")   Wt 74.8 kg (165 lb)   SpO2 98%   BMI 26.64 kg/m²     Neurology Exam:    General apperance: NAD.     Mental status: Alert, awake and oriented to time place and person.    Recent and Remote memory: Intact.    Attention " span and Concentration: Normal.     Language and Speech: Intact- No dysarthria.    Fluency, Naming , Repitition and Comprehension:  Intact    Cranial Nerves:   CN II: Visual fields are full. Intact. Fundi - Normal, No papillederma, Pupils - CRISTAL  CN III, IV and VI: Extraocular movements are intact. Normal saccades.   CN V: Facial sensation is intact.   CN VII: Muscles of facial expression reveal no asymmetry. Intact.   CN VIII: Hearing is intact. Whispered voice intact.   CN IX and X: Palate elevates symmetrically. Intact  CN XI: Shoulder shrug is intact.   CN XII: Tongue is midline without evidence of atrophy or fasciculation.     Ophthalmoscopic exam of optic disc-normal    Motor:  Right UE muscle strength 5/5. Normal tone.     Left UE muscle strength 5/5. Normal tone.      Right LE muscle strength5/5. Normal tone.     Left LE muscle strength 5/5. Normal tone.      Sensory: Normal light touch, vibration and pinprick sensation bilaterally.    DTRs: 2+ bilaterally in upper and lower extremities.    Babinski: Negative bilaterally.    Co-ordination: Normal finger-to-nose, heel to shin B/L.    Rhomberg: Negative.    Gait: Normal.    Cardiovascular: Regular rate and rhythm without murmur, gallop or rub.    Assessment and Plan:  1. Cerebrovascular accident (CVA), unspecified mechanism (CMS/HCC)  Patient had acute infarcts in the left PCA territory along with left P1/P2 occlusion and distal right vertebral occlusion.  Holter monitor is still pending  I have asked her to continue both aspirin 81 mg and Plavix for now  She can cut back on her Crestor to 5 mg daily as she is unable to tolerate the higher dose.  Goal LDL of less than 100 her LDL was 143.  I will repeat a lipid panel today  Goal blood pressure less than 130/90.  Her blood pressure is still running high hence I have asked her to speak to her cardiologist about adjusting her medications.    - Lipid Panel; Future      2. Myalgia  I will check her electrolytes  and CK level today.  I have told her to reduce her Crestor to 5 mg daily.    - Comprehensive Metabolic Panel; Future  - CK; Future      3. Fatigue, unspecified type  We will check labs  - Comprehensive Metabolic Panel; Future  - Vitamin B12; Future  - TSH+Free T4; Future  - Vitamin D 25 Hydroxy; Future  - Vitamin D 25 Hydroxy  - TSH+Free T4  - Vitamin B12  - Comprehensive Metabolic Panel       Return in about 1 day (around 11/13/2020).     I spent over 50 minutes with the patient face to face out of which over 50% (30 minutes) was spent in management, instructions and education.     Berna Araiza MD

## 2020-11-13 NOTE — OUTREACH NOTE
Stroke Week 3 Survey      Responses   [facility] patient discharged from?  [place]   Does the patient have one of the following disease processes/diagnoses(primary or secondary)?  Stroke (TIA)   Week 3 attempt successful?  Yes   Call start time  1550   Call end time  1552   Discharge diagnosis  Stroke    Is the patient taking all medications as directed (includes completed medication regime)?  Yes   Has the patient kept scheduled appointments due by today?  Yes   Psychosocial issues?  No   Does the patient require any assistance with activities of daily living such as eating, bathing, dressing, walking, etc.?  No   Does the patient have any residual symptoms from stroke/TIA?  No   What is the patient's perception of their health status since discharge?  Improving   Nursing interventions  Nurse provided patient education   Is the patient able to teach back FAST for Stroke?  Yes   Is the patient/caregiver able to teach back the risk factors for a stroke?  High blood pressure-goal below 120/80, Diabetes, Physical inactivity and obesity, History of TIAs, High Cholesterol   Is the patient/caregiver able to teach back signs and symptoms related to disease process for when to call PCP?  Yes   Is the patient/caregiver able to teach back signs and symptoms related to disease process for when to call 911?  Yes   Is the patient/caregiver able to teach back the hierarchy of who to call/visit for symptoms/problems? PCP, Specialist, Home health nurse, Urgent Care, ED, 911  Yes   Week 3 call completed?  Yes   Wrap up additional comments  Patient says she is doing well, she saw her neurologist yesterday, no questions or concerns at this time.          Shruthi Monsivais RN

## 2020-11-17 ENCOUNTER — TELEPHONE (OUTPATIENT)
Dept: CARDIOLOGY | Facility: CLINIC | Age: 75
End: 2020-11-17

## 2020-11-17 NOTE — TELEPHONE ENCOUNTER
Sofia notified of message above from . Verbalized understanding.Stated would call back once all her symptoms subsided and would then request the new Rx be sent to her pharmacy.

## 2020-11-17 NOTE — TELEPHONE ENCOUNTER
Have her start atorvastatin 10 mg p.o. daily once all of her leg pain, dizziness, weakness and headache resolved.  Have her keep a blood pressure log for a week in order to help us further titrate her medical therapy.

## 2020-11-17 NOTE — TELEPHONE ENCOUNTER
Rosuvastatin 10 mg daily has caused severe leg pain,dizziness, weakness and h/a. Refuses to take this medication. Neurologist advised her to decrease it to 5 mg daily. She still does not want to take it because of the symptoms it causes her. She wants to know if there is anything else to replace it with?Please advise. Also Neurologist told her to call and have you adjust her b/p medications. B/P in Neurologist office was 130/84. Today 149/80.  Daughter states b/p is low some days and high other days. No readings to report. Please advise.

## 2020-11-19 ENCOUNTER — TELEPHONE (OUTPATIENT)
Dept: NEUROLOGY | Facility: CLINIC | Age: 75
End: 2020-11-19

## 2020-11-19 NOTE — TELEPHONE ENCOUNTER
----- Message from Berna Araiza MD sent at 11/18/2020  4:34 PM EST -----  Please let the patient know that her cholesterol panel was normal other than slightly elevated triglycerides hence she can reduce her Crestor to 5 mg.  Also her vitamin D level, B12 level and electrolytes are normal but her kidney function has gone down slightly.  She should keep herself well-hydrated as dehydration can do that.  Also avoid taking too many NSAIDs such as ibuprofen

## 2020-12-01 ENCOUNTER — TELEPHONE (OUTPATIENT)
Dept: CARDIOLOGY | Facility: CLINIC | Age: 75
End: 2020-12-01

## 2020-12-01 DIAGNOSIS — I48.0 PAROXYSMAL ATRIAL FIBRILLATION (HCC): ICD-10-CM

## 2020-12-01 DIAGNOSIS — I63.9 CEREBROVASCULAR ACCIDENT (CVA), UNSPECIFIED MECHANISM (HCC): Primary | ICD-10-CM

## 2020-12-01 NOTE — TELEPHONE ENCOUNTER
----- Message from Nicanor Vega III, MD sent at 12/1/2020 12:59 PM EST -----  No concerning heart rhythm issues noted on monitor.  Needs to be set up for loop implantation.

## 2020-12-02 PROBLEM — I63.9 CEREBROVASCULAR ACCIDENT (CVA) (HCC): Status: ACTIVE | Noted: 2020-12-02

## 2020-12-03 ENCOUNTER — PREP FOR SURGERY (OUTPATIENT)
Dept: OTHER | Facility: HOSPITAL | Age: 75
End: 2020-12-03

## 2020-12-03 DIAGNOSIS — I63.9 CEREBROVASCULAR ACCIDENT (CVA) (HCC): Primary | ICD-10-CM

## 2020-12-03 RX ORDER — NITROGLYCERIN 0.4 MG/1
0.4 TABLET SUBLINGUAL
Status: CANCELLED | OUTPATIENT
Start: 2020-12-03

## 2020-12-03 RX ORDER — ONDANSETRON 2 MG/ML
4 INJECTION INTRAMUSCULAR; INTRAVENOUS EVERY 6 HOURS PRN
Status: CANCELLED | OUTPATIENT
Start: 2020-12-03

## 2020-12-03 RX ORDER — ACETAMINOPHEN 325 MG/1
650 TABLET ORAL EVERY 4 HOURS PRN
Status: CANCELLED | OUTPATIENT
Start: 2020-12-03

## 2020-12-03 RX ORDER — SODIUM CHLORIDE 0.9 % (FLUSH) 0.9 %
3-10 SYRINGE (ML) INJECTION AS NEEDED
Status: CANCELLED | OUTPATIENT
Start: 2020-12-03

## 2020-12-03 RX ORDER — CEFAZOLIN SODIUM 2 G/100ML
2 INJECTION, SOLUTION INTRAVENOUS
Status: CANCELLED | OUTPATIENT
Start: 2020-12-03

## 2020-12-03 RX ORDER — SODIUM CHLORIDE 0.9 % (FLUSH) 0.9 %
3 SYRINGE (ML) INJECTION EVERY 12 HOURS SCHEDULED
Status: CANCELLED | OUTPATIENT
Start: 2020-12-03

## 2020-12-14 ENCOUNTER — APPOINTMENT (OUTPATIENT)
Dept: PREADMISSION TESTING | Facility: HOSPITAL | Age: 75
End: 2020-12-14

## 2020-12-14 PROCEDURE — U0004 COV-19 TEST NON-CDC HGH THRU: HCPCS

## 2020-12-14 PROCEDURE — C9803 HOPD COVID-19 SPEC COLLECT: HCPCS

## 2020-12-15 LAB — SARS-COV-2 RNA RESP QL NAA+PROBE: NOT DETECTED

## 2020-12-17 ENCOUNTER — TELEPHONE (OUTPATIENT)
Dept: CARDIOLOGY | Facility: CLINIC | Age: 75
End: 2020-12-17

## 2020-12-17 ENCOUNTER — HOSPITAL ENCOUNTER (OUTPATIENT)
Facility: HOSPITAL | Age: 75
Discharge: HOME | End: 2020-12-17
Attending: INTERNAL MEDICINE | Admitting: INTERNAL MEDICINE

## 2020-12-17 VITALS
SYSTOLIC BLOOD PRESSURE: 145 MMHG | HEART RATE: 56 BPM | TEMPERATURE: 97.6 F | OXYGEN SATURATION: 97 % | HEIGHT: 66 IN | DIASTOLIC BLOOD PRESSURE: 70 MMHG | BODY MASS INDEX: 25.94 KG/M2 | RESPIRATION RATE: 16 BRPM | WEIGHT: 161.38 LBS

## 2020-12-17 DIAGNOSIS — I63.9 CEREBROVASCULAR ACCIDENT (CVA), UNSPECIFIED MECHANISM (HCC): ICD-10-CM

## 2020-12-17 DIAGNOSIS — I63.9 CEREBROVASCULAR ACCIDENT (CVA) (HCC): ICD-10-CM

## 2020-12-17 PROBLEM — E78.2 MIXED HYPERLIPIDEMIA: Status: ACTIVE | Noted: 2020-10-15

## 2020-12-17 PROBLEM — Z86.73 HISTORY OF CVA (CEREBROVASCULAR ACCIDENT): Status: ACTIVE | Noted: 2020-12-02

## 2020-12-17 LAB
ANION GAP SERPL CALCULATED.3IONS-SCNC: 12 MMOL/L (ref 5–15)
BUN SERPL-MCNC: 23 MG/DL (ref 8–23)
BUN/CREAT SERPL: 25.8 (ref 7–25)
CALCIUM SPEC-SCNC: 9.6 MG/DL (ref 8.6–10.5)
CHLORIDE SERPL-SCNC: 107 MMOL/L (ref 98–107)
CO2 SERPL-SCNC: 21 MMOL/L (ref 22–29)
CREAT SERPL-MCNC: 0.89 MG/DL (ref 0.57–1)
DEPRECATED RDW RBC AUTO: 44.1 FL (ref 37–54)
ERYTHROCYTE [DISTWIDTH] IN BLOOD BY AUTOMATED COUNT: 12.8 % (ref 12.3–15.4)
GFR SERPL CREATININE-BSD FRML MDRD: 62 ML/MIN/1.73
GLUCOSE SERPL-MCNC: 99 MG/DL (ref 65–99)
HCT VFR BLD AUTO: 40.2 % (ref 34–46.6)
HGB BLD-MCNC: 13.3 G/DL (ref 12–15.9)
MCH RBC QN AUTO: 31.1 PG (ref 26.6–33)
MCHC RBC AUTO-ENTMCNC: 33.1 G/DL (ref 31.5–35.7)
MCV RBC AUTO: 93.9 FL (ref 79–97)
PLATELET # BLD AUTO: 223 10*3/MM3 (ref 140–450)
PMV BLD AUTO: 10.9 FL (ref 6–12)
POTASSIUM SERPL-SCNC: 4 MMOL/L (ref 3.5–5.2)
RBC # BLD AUTO: 4.28 10*6/MM3 (ref 3.77–5.28)
SODIUM SERPL-SCNC: 140 MMOL/L (ref 136–145)
WBC # BLD AUTO: 7.31 10*3/MM3 (ref 3.4–10.8)

## 2020-12-17 PROCEDURE — 33285 INSJ SUBQ CAR RHYTHM MNTR: CPT | Performed by: INTERNAL MEDICINE

## 2020-12-17 PROCEDURE — 85027 COMPLETE CBC AUTOMATED: CPT | Performed by: PHYSICIAN ASSISTANT

## 2020-12-17 PROCEDURE — 80048 BASIC METABOLIC PNL TOTAL CA: CPT | Performed by: PHYSICIAN ASSISTANT

## 2020-12-17 PROCEDURE — C1764 EVENT RECORDER, CARDIAC: HCPCS | Performed by: INTERNAL MEDICINE

## 2020-12-17 PROCEDURE — 25010000003 CEFAZOLIN IN DEXTROSE 2-4 GM/100ML-% SOLUTION: Performed by: PHYSICIAN ASSISTANT

## 2020-12-17 DEVICE — LUX-DX™ INSERTABLE CARDIAC MONITOR
Type: IMPLANTABLE DEVICE | Status: FUNCTIONAL
Brand: LUX-DX™ INSERTABLE CARDIAC MONITOR

## 2020-12-17 RX ORDER — LIDOCAINE HYDROCHLORIDE 10 MG/ML
INJECTION, SOLUTION EPIDURAL; INFILTRATION; INTRACAUDAL; PERINEURAL AS NEEDED
Status: DISCONTINUED | OUTPATIENT
Start: 2020-12-17 | End: 2020-12-17 | Stop reason: HOSPADM

## 2020-12-17 RX ORDER — NITROGLYCERIN 0.4 MG/1
0.4 TABLET SUBLINGUAL
Status: DISCONTINUED | OUTPATIENT
Start: 2020-12-17 | End: 2020-12-17 | Stop reason: HOSPADM

## 2020-12-17 RX ORDER — PRAMIPEXOLE DIHYDROCHLORIDE 0.12 MG/1
0.25 TABLET ORAL NIGHTLY
COMMUNITY

## 2020-12-17 RX ORDER — LOSARTAN POTASSIUM 100 MG/1
100 TABLET ORAL DAILY
COMMUNITY
End: 2022-02-28 | Stop reason: SDUPTHER

## 2020-12-17 RX ORDER — SODIUM CHLORIDE 0.9 % (FLUSH) 0.9 %
3-10 SYRINGE (ML) INJECTION AS NEEDED
Status: DISCONTINUED | OUTPATIENT
Start: 2020-12-17 | End: 2020-12-17 | Stop reason: HOSPADM

## 2020-12-17 RX ORDER — MULTIPLE VITAMINS W/ MINERALS TAB 9MG-400MCG
1 TAB ORAL DAILY
COMMUNITY

## 2020-12-17 RX ORDER — ONDANSETRON 2 MG/ML
4 INJECTION INTRAMUSCULAR; INTRAVENOUS EVERY 6 HOURS PRN
Status: DISCONTINUED | OUTPATIENT
Start: 2020-12-17 | End: 2020-12-17 | Stop reason: HOSPADM

## 2020-12-17 RX ORDER — CEFAZOLIN SODIUM 2 G/100ML
2 INJECTION, SOLUTION INTRAVENOUS
Status: COMPLETED | OUTPATIENT
Start: 2020-12-17 | End: 2020-12-17

## 2020-12-17 RX ORDER — CARVEDILOL 3.12 MG/1
3.12 TABLET ORAL 2 TIMES DAILY WITH MEALS
COMMUNITY
End: 2021-10-11

## 2020-12-17 RX ORDER — SODIUM CHLORIDE 0.9 % (FLUSH) 0.9 %
3 SYRINGE (ML) INJECTION EVERY 12 HOURS SCHEDULED
Status: DISCONTINUED | OUTPATIENT
Start: 2020-12-17 | End: 2020-12-17 | Stop reason: HOSPADM

## 2020-12-17 RX ORDER — ACETAMINOPHEN 325 MG/1
650 TABLET ORAL EVERY 4 HOURS PRN
Status: DISCONTINUED | OUTPATIENT
Start: 2020-12-17 | End: 2020-12-17 | Stop reason: HOSPADM

## 2020-12-17 RX ORDER — ASPIRIN 81 MG/1
81 TABLET ORAL DAILY
COMMUNITY
End: 2021-04-06

## 2020-12-17 RX ADMIN — CEFAZOLIN SODIUM 2 G: 2 INJECTION, SOLUTION INTRAVENOUS at 10:47

## 2020-12-17 NOTE — TELEPHONE ENCOUNTER
STEVE  States just got home from having loop recorder placed. Her bra/shirt & sweater soaked in blood. Daniella pool of blood under bandage. Advised her to go to the ED to be evaluated. Verbalized understanding

## 2020-12-17 NOTE — H&P
"[facility] at [facility]  Cardiovascular History and Physical Note         Patient is a 75-year-old female with a history of breast cancer status postmastectomy on Arimidex, hypertension, and hyperlipidemia who suffered an acute CVA in October of this year when she presented to outside facility with blurred vision and right arm numbness along with a headache.  MRI showed acute infarcts in the left medial occipital lobe as well as the splenium of corpus callosum.  VIDAL was unremarkable and a 30-day monitor did not show any arrhythmias to account for her CVA.  She presents today to undergo loop recorder implant to assess for any occult atrial fibrillation that could have caused her CVA.  Since her stroke she has been maintained on aspirin, Plavix and statin therapy.  She reports having extreme muscle weakness even on low-dose Crestor.  She also reports ever since her stroke she has been struggling with intermittent headaches.  She also reports her blood pressures have been \"all over the place\".  Sometimes they are very high and other times low.  When she had her CVA on arrival to our facility she was hypertensive at 195/104.  She has taken all of her morning medications and blood pressure is currently in the 140s over 80s.    Cardiac risk factors: Advanced age, hypertension, hyperlipidemia    Past medical and surgical history, social and family history reviewed in EMR.    REVIEW OF SYSTEMS:   H&P ROS reviewed and pertinent CV ROS as noted in HPI.         Vital Sign Min/Max for last 24 hours  Temp  Min: 97.6 °F (36.4 °C)  Max: 97.6 °F (36.4 °C)   BP  Min: 130/82  Max: 148/81   Pulse  Min: 48  Max: 48   Resp  Min: 16  Max: 16   SpO2  Min: 96 %  Max: 96 %   No data recorded    No intake or output data in the 24 hours ending 12/17/20 0986        Constitutional:       Appearance: Healthy appearance. Well-developed.   Eyes:      General: Lids are normal. No scleral icterus.     Conjunctiva/sclera: " Conjunctivae normal.   HENT:      Head: Normocephalic and atraumatic.   Neck:      Musculoskeletal: Normal range of motion.      Thyroid: No thyromegaly.      Vascular: No carotid bruit or JVD.   Pulmonary:      Effort: Pulmonary effort is normal.      Breath sounds: Normal breath sounds. No wheezing. No rhonchi. No rales.   Cardiovascular:      Normal rate. Regular rhythm.      Murmurs: There is no murmur.      No gallop. No rub.   Pulses:     Intact distal pulses.   Edema:     Peripheral edema absent.   Abdominal:      General: There is no distension.      Palpations: Abdomen is soft. There is no abdominal mass.   Skin:     General: Skin is warm and dry.      Findings: No rash.   Neurological:      General: No focal deficit present.      Mental Status: Alert and oriented to person, place, and time.      Gait: Gait is intact.   Psychiatric:         Attention and Perception: Attention normal.         Mood and Affect: Mood normal.         Behavior: Behavior normal.       EKG: 10/17/2020 sinus bradycardia    Lab Review:   Labs reviewed in the electronic medical record.  Pertinent findings include:  Lab Results   Component Value Date    GLUCOSE 99 12/17/2020    BUN 23 12/17/2020    CREATININE 0.89 12/17/2020    EGFRIFNONA 62 12/17/2020    EGFRIFAFRI 57 (L) 11/13/2020    BCR 25.8 (H) 12/17/2020    K 4.0 12/17/2020    CO2 21.0 (L) 12/17/2020    CALCIUM 9.6 12/17/2020    PROTENTOTREF 6.9 11/13/2020    ALBUMIN 4.80 11/13/2020    LABIL2 2.3 11/13/2020    AST 24 11/13/2020    ALT 30 11/13/2020     Lab Results   Component Value Date    WBC 7.31 12/17/2020    HGB 13.3 12/17/2020    HCT 40.2 12/17/2020    MCV 93.9 12/17/2020     12/17/2020     Lab Results   Component Value Date    CHOL 203 (H) 10/15/2020    CHLPL 150 11/13/2020    TRIG 154 (H) 11/13/2020    HDL 45 11/13/2020    LDL 78 11/13/2020                Active Hospital Problems    Diagnosis   • **History of CVA (cerebrovascular accident)     · MRI  (10/2020):Multiple small infarcts of left splenium of corpus collosum and medial left occipital  · VIDAL (10/16/2020): LVEF 61-65%.  LVH.  No left atrial appendage thrombus.  Saline test negative for right to left atrial level shunting  · 30-day monitor (11/2020): Occasional PACs and PVCs.  No atrial fibrillation.     • Mixed hyperlipidemia   • Essential hypertension     Patient presents today to undergo a loop recorder implant due to recent CVA and concern for possible cardioembolic source as a cause.  The risks and benefits of the procedure were discussed and the patient is agreeable to proceed.  She has been tested for COVID-19 and results were negative.          Plan:  · Proceed with loop recorder implant  · Further recommendations to follow      DIONY Del Rio

## 2021-01-04 NOTE — OUTREACH NOTE
Stroke Week 2 Survey      Responses   [facility] patient discharged from?  [place]   Does the patient have one of the following disease processes/diagnoses(primary or secondary)?  Stroke (TIA)   Week 2 attempt successful?  No   Unsuccessful attempts  Attempt 1          Gail Coulter RN  
normal...

## 2021-01-30 PROCEDURE — G2066 INTER DEVC REMOTE 30D: HCPCS | Performed by: INTERNAL MEDICINE

## 2021-01-30 PROCEDURE — 93298 REM INTERROG DEV EVAL SCRMS: CPT | Performed by: INTERNAL MEDICINE

## 2021-02-22 ENCOUNTER — OFFICE VISIT (OUTPATIENT)
Dept: NEUROLOGY | Facility: CLINIC | Age: 76
End: 2021-02-22

## 2021-02-22 ENCOUNTER — OFFICE VISIT (OUTPATIENT)
Dept: CARDIOLOGY | Facility: CLINIC | Age: 76
End: 2021-02-22

## 2021-02-22 VITALS
HEART RATE: 50 BPM | DIASTOLIC BLOOD PRESSURE: 80 MMHG | HEIGHT: 66 IN | SYSTOLIC BLOOD PRESSURE: 146 MMHG | TEMPERATURE: 97.8 F | BODY MASS INDEX: 25.96 KG/M2 | WEIGHT: 161.5 LBS

## 2021-02-22 VITALS
BODY MASS INDEX: 26.52 KG/M2 | OXYGEN SATURATION: 99 % | WEIGHT: 165 LBS | DIASTOLIC BLOOD PRESSURE: 74 MMHG | HEART RATE: 55 BPM | HEIGHT: 66 IN | SYSTOLIC BLOOD PRESSURE: 138 MMHG

## 2021-02-22 DIAGNOSIS — E78.2 MIXED HYPERLIPIDEMIA: Primary | ICD-10-CM

## 2021-02-22 DIAGNOSIS — I10 ESSENTIAL HYPERTENSION: ICD-10-CM

## 2021-02-22 DIAGNOSIS — I63.9 CEREBROVASCULAR ACCIDENT (CVA), UNSPECIFIED MECHANISM (HCC): Primary | ICD-10-CM

## 2021-02-22 PROCEDURE — 93291 INTERROG DEV EVAL SCRMS IP: CPT | Performed by: INTERNAL MEDICINE

## 2021-02-22 PROCEDURE — 99214 OFFICE O/P EST MOD 30 MIN: CPT | Performed by: INTERNAL MEDICINE

## 2021-02-22 PROCEDURE — 99214 OFFICE O/P EST MOD 30 MIN: CPT | Performed by: PSYCHIATRY & NEUROLOGY

## 2021-02-22 RX ORDER — ROSUVASTATIN CALCIUM 5 MG/1
5 TABLET, COATED ORAL NIGHTLY
Qty: 90 TABLET | Refills: 3 | Status: SHIPPED | OUTPATIENT
Start: 2021-02-22 | End: 2021-11-22

## 2021-02-22 RX ORDER — ROSUVASTATIN CALCIUM 5 MG/1
5 TABLET, COATED ORAL NIGHTLY
Qty: 30 TABLET | Refills: 11 | Status: SHIPPED | OUTPATIENT
Start: 2021-02-22 | End: 2021-02-22 | Stop reason: SDUPTHER

## 2021-02-22 NOTE — PROGRESS NOTES
[facility] at [facility]  Office visit  Yumi Crooks  1945  [phone]  There is no work phone number on file.    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 Left ventricular systolic function is normal.  · Left ventricular wall thickness is consistent with concentric hypertrophy.  · No evidence of a left atrial appendage thrombus  · Saline test results are negative for right to left atrial level shunt.    ASSESSMENT:   Diagnosis Plan   1. Mixed hyperlipidemia     2. Essential hypertension       Loop recorder interrogation: [employer].  Normal, no arrhythmia.    PLAN:  CVA:  Reviewed neurology colleagues note.  Continue dual antiplatelet therapy and statin.  Afterload well controlled.  Continue loop recorder monitoring.    Hypertension: Goal less than 130/80 mmHg.  Continue current medical therapy.  Patient will keep a blood pressure log.     Hyperlipidemia: Goal LDL less than 70.    Continue rosuvastatin 5 mg p.o. daily.    Excellent response to statin therapy.    Subjective  Assessment: Stable functional capacity.  Blood pressures typically running less than 145/90 mmHg.  She is compliant with medical therapy.    Initial evaluation:75-year-old female with a history of breast cancer status postmastectomy, on Arimidex, hypertension, dyslipidemia presenting with worsening right arm numbness with a evaluation revealing evidence of multiple small infarcts in the left splenium of the corpus callosum and medial left occipital.  VIDAL unremarkable.  Inpatient telemetry  "unremarkable.  Ambulatory ECG monitor pending.  She was on aspirin at the time of her stroke.  Complaining of fatigue, nausea, intermittent mild headaches, intermittent episodes of sudden onset dizziness, and intermittent nocturnal leg cramps.  She denies chest pain, dyspnea exertion or palpitations.  Blood pressures running less than 140/90 mmHg.  Atorvastatin elicited myalgias.  She is compliant with medical therapy.    PHYSICAL EXAMINATION:  Vitals:    02/22/21 1452   BP: 138/74   BP Location: Right arm   Patient Position: Sitting   Pulse: 55   SpO2: 99%   Weight: 74.8 kg (165 lb)   Height: 167.6 cm (66\")     General Appearance:    Alert, cooperative, no distress, appears stated age   Head:    Normocephalic, without obvious abnormality, atraumatic   Eyes:    conjunctiva/corneas clear   Nose:   Nares normal, septum midline, mucosa normal, no drainage   Throat:   Lips, teeth and gums normal   Neck:   Supple, symmetrical, trachea midline, no carotid    bruit or JVD   Lungs:     Clear to auscultation bilaterally, respirations unlabored   Chest Wall:    No tenderness or deformity    Heart:    Regular rate and rhythm, S1 and S2 normal, no murmur, rub   or gallop, normal carotid impulse bilaterally without bruit.   Abdomen:     Soft, non-tender   Extremities:   Extremities normal, atraumatic, no cyanosis or edema   Pulses:   2+ and symmetric all extremities   Skin:   Skin color, texture, turgor normal, no rashes or lesions       Diagnostic Data:  Procedures  Lab Results   Component Value Date    CHLPL 150 11/13/2020    TRIG 154 (H) 11/13/2020    HDL 45 11/13/2020     Lab Results   Component Value Date    GLUCOSE 99 12/17/2020    BUN 23 12/17/2020    CREATININE 0.89 12/17/2020     12/17/2020    K 4.0 12/17/2020     12/17/2020    CO2 21.0 (L) 12/17/2020     Lab Results   Component Value Date    HGBA1C 5.70 (H) 10/15/2020     Lab Results   Component Value Date    WBC 7.31 12/17/2020    HGB 13.3 12/17/2020    " "HCT 40.2 12/17/2020     12/17/2020       Allergies  Allergies   Allergen Reactions   • Lipitor [Atorvastatin] Myalgia   • Lortab [Hydrocodone-Acetaminophen] Other (See Comments)     \"hyperventilate\"   • Penicillins Other (See Comments)     Had too much as a kid   • Sulfa Antibiotics Other (See Comments)      \"too much as a kid...and, it ruined my teeth\"       Current Medications    Current Outpatient Medications:   •  amLODIPine (NORVASC) 10 MG tablet, Take 10 mg by mouth Daily., Disp: , Rfl:   •  anastrozole (ARIMIDEX) 1 MG tablet, Take 1 mg by mouth Daily., Disp: , Rfl:   •  aspirin 81 MG EC tablet, Take 81 mg by mouth Daily., Disp: , Rfl:   •  busPIRone (BUSPAR) 5 MG tablet, Take 5 mg by mouth 2 (two) times a day., Disp: , Rfl:   •  carvedilol (COREG) 3.125 MG tablet, Take 3.125 mg by mouth 2 (Two) Times a Day With Meals., Disp: , Rfl:   •  clopidogrel (PLAVIX) 75 MG tablet, Take 1 tablet by mouth Daily., Disp: 90 tablet, Rfl: 1  •  esomeprazole (nexIUM) 40 MG capsule, Take 1 capsule by mouth Every Morning Before Breakfast., Disp: 30 capsule, Rfl: 5  •  losartan (COZAAR) 100 MG tablet, Take 100 mg by mouth Daily., Disp: , Rfl:   •  Multiple Vitamins-Minerals (multivitamin, eye vitamin,) tablet tablet, Take 1 tablet by mouth Daily., Disp: , Rfl:   •  multivitamin with minerals (MULTIVITAMIN ADULTS PO), Take 1 tablet by mouth Daily., Disp: , Rfl:   •  pramipexole (MIRAPEX) 0.125 MG tablet, Take 0.125 mg by mouth Every Night., Disp: , Rfl:   •  rosuvastatin (Crestor) 5 MG tablet, Take 1 tablet by mouth Every Night., Disp: 90 tablet, Rfl: 3          ROS  ROS      SOCIAL HX  Social History     Socioeconomic History   • Marital status:      Spouse name: Not on file   • Number of children: Not on file   • Years of education: Not on file   • Highest education level: Not on file   Tobacco Use   • Smoking status: Never Smoker   • Smokeless tobacco: Never Used   Substance and Sexual Activity   • Alcohol use: " Not Currently     Frequency: Never   • Drug use: Never   • Sexual activity: Defer       FAMILY HX  Family History   Problem Relation Age of Onset   • Breast cancer Neg Hx    • Ovarian cancer Neg Hx              Nicanor Vega III, MD, FACC

## 2021-02-22 NOTE — PROGRESS NOTES
Subjective:    CC: Yumi Crooks is seen today  for stroke    HPI:  Current visit-patient states she has not had any new strokelike symptoms since her last visit.  Her previous symptoms of blurred vision and right arm numbness have now subsided.  She continues to take dual antiplatelets along with Crestor 10 mg.  She tried cutting down her pill to 5 mg but was unable to.  Is tolerating it better now.  Her last lipid panel was as follows-, , LDL 78, HDL 45.  CK was 210.  B12 and vitamin D levels were normal.  She also had a Holter monitor that showed PVCs and PACs but no atrial fibrillation.  Has a loop recorder in place now.    Initial eapod-89-bbku-old female accompanied by her daughter with a history of hypertension, hyperlipidemia, breast cancer status post mastectomy, anxiety, depression, arthritis presents as a hospital follow-up for stroke.  As per patient she had symptoms of blurred vision along with right arm numbness and a headache on 10/14.  She was initially taken to an outside hospital and subsequently transferred here.  Her initial blood pressure was 195/104..  Initial CT scan of the head showed arachnoid cyst in the left temporal lobe but no any acute abnormalities.  She had a CT angiogram of the head and neck that showed right distal vertebral occlusion as well as left P1/P2 occlusion with no significant intracarotid stenosis.  MRI brain showed acute infarcts in the left medial occipital lobe as well as an splenium of corpus callosum.  EEG was normal. VIDAL showed a normal EF with left ventricular concentric hypertrophy but normal left atrial appendage thrombus or PFO.  She was started on dual antiplatelets as well as Lipitor 80 mg daily.  Her lipid panel was as follows-, , , HDL 30.  A1c was 5.7.  Patient could not tolerate Lipitor and she was recently switched to Crestor 10 mg daily by her cardiologist but is unable to tolerate that as well as she is having pain in her  legs.  She also feels tired at all times.  Had a Holter monitor after discharge the results of which are still pending.  Her systolic blood pressure at home is still running high in the 140s and 150s.  Of note-I personally reviewed her MRI brain and Dr. Jim's's notes.    The following portions of the patient's history were reviewed today and updated as of 11/12/2020  : allergies, current medications, past family history, past medical history, past social history, past surgical history and problem list  These document will be scanned to patient's chart.      Current Outpatient Medications:   •  amLODIPine (NORVASC) 10 MG tablet, Take 10 mg by mouth Daily., Disp: , Rfl:   •  anastrozole (ARIMIDEX) 1 MG tablet, Take 1 mg by mouth Daily., Disp: , Rfl:   •  aspirin 81 MG EC tablet, Take 81 mg by mouth Daily., Disp: , Rfl:   •  busPIRone (BUSPAR) 5 MG tablet, Take 5 mg by mouth 2 (two) times a day., Disp: , Rfl:   •  carvedilol (COREG) 3.125 MG tablet, Take 3.125 mg by mouth 2 (Two) Times a Day With Meals., Disp: , Rfl:   •  clopidogrel (PLAVIX) 75 MG tablet, Take 1 tablet by mouth Daily., Disp: 90 tablet, Rfl: 1  •  esomeprazole (nexIUM) 40 MG capsule, Take 1 capsule by mouth Every Morning Before Breakfast., Disp: 30 capsule, Rfl: 5  •  losartan (COZAAR) 100 MG tablet, Take 100 mg by mouth Daily., Disp: , Rfl:   •  Multiple Vitamins-Minerals (multivitamin, eye vitamin,) tablet tablet, Take 1 tablet by mouth Daily., Disp: , Rfl:   •  multivitamin with minerals (MULTIVITAMIN ADULTS PO), Take 1 tablet by mouth Daily., Disp: , Rfl:   •  pramipexole (MIRAPEX) 0.125 MG tablet, Take 0.125 mg by mouth Every Night., Disp: , Rfl:   •  rosuvastatin (Crestor) 5 MG tablet, Take 1 tablet by mouth Every Night., Disp: 30 tablet, Rfl: 11   Past Medical History:   Diagnosis Date   • Dyslipidemia    • Hypertension       Past Surgical History:   Procedure Laterality Date   • BREAST BIOPSY Right     2015   • CARDIAC ELECTROPHYSIOLOGY  "PROCEDURE N/A 12/17/2020    Procedure: Loop insertion;  Surgeon: Nicanor Vega III, MD;  Location: [facility] INVASIVE LOCATION;  Service: Cardiovascular;  Laterality: N/A;      Family History   Problem Relation Age of Onset   • Breast cancer Neg Hx    • Ovarian cancer Neg Hx       Social History     Socioeconomic History   • Marital status:      Spouse name: Not on file   • Number of children: Not on file   • Years of education: Not on file   • Highest education level: Not on file   Tobacco Use   • Smoking status: Never Smoker   • Smokeless tobacco: Never Used   Substance and Sexual Activity   • Alcohol use: Not Currently     Frequency: Never   • Drug use: Never   • Sexual activity: Defer     Review of Systems   Musculoskeletal: Positive for myalgias.   All other systems reviewed and are negative.      Objective:    /80   Pulse 50   Temp 97.8 °F (36.6 °C)   Ht 167.6 cm (65.98\")   Wt 73.3 kg (161 lb 8 oz)   BMI 26.08 kg/m²     Neurology Exam:    General apperance: NAD.     Mental status: Alert, awake and oriented to time place and person.    Recent and Remote memory: Intact.    Attention span and Concentration: Normal.     Language and Speech: Intact- No dysarthria.    Fluency, Naming , Repitition and Comprehension:  Intact    Cranial Nerves:   CN II: Visual fields are full. Intact. Fundi - Normal, No papillederma, Pupils - CRISTAL  CN III, IV and VI: Extraocular movements are intact. Normal saccades.   CN V: Facial sensation is intact.   CN VII: Muscles of facial expression reveal no asymmetry. Intact.   CN VIII: Hearing is intact. Whispered voice intact.   CN IX and X: Palate elevates symmetrically. Intact  CN XI: Shoulder shrug is intact.   CN XII: Tongue is midline without evidence of atrophy or fasciculation.     Ophthalmoscopic exam of optic disc-normal    Motor:  Right UE muscle strength 5/5. Normal tone.     Left UE muscle strength 5/5. Normal tone.      Right LE muscle strength5/5. Normal " tone.     Left LE muscle strength 5/5. Normal tone.      Sensory: Normal light touch, vibration and pinprick sensation bilaterally.    DTRs: 2+ bilaterally in upper and lower extremities.    Babinski: Negative bilaterally.    Co-ordination: Normal finger-to-nose, heel to shin B/L.    Rhomberg: Negative.    Gait: Normal.    Cardiovascular: Regular rate and rhythm without murmur, gallop or rub.    Assessment and Plan:  1. Cerebrovascular accident (CVA), unspecified mechanism (CMS/HCC)  Patient had acute infarcts in the left PCA territory along with left P1/P2 occlusion and distal right vertebral occlusion.  Holter monitor showed PACs and PVCs but no A. fib.  She has a loop recorder in place now  I have asked her to continue both aspirin 81 mg and Plavix for now  She can cut back on her Crestor to 5 mg daily as she had myalgias with the higher dose.  Goal LDL of less than 100 her LDL was 78.  Goal blood pressure less than 130/90.  I have told her to monitor her blood pressure regularly    2.  Mild cognitive problems  She complains of mild memory problems since the stroke.  I told her to carry out physical and mental activities       Return in about 5 months (around 7/22/2021).       Berna Araiza MD

## 2021-02-25 NOTE — PLAN OF CARE
Problem: Adult Inpatient Plan of Care  Goal: Plan of Care Review  Recent Flowsheet Documentation  Taken 10/15/2020 1502 by Isi Varghese OT  Plan of Care Reviewed With: patient  Outcome Summary: Pt does not exhibit any functional decline despite c/o numbness in R side toes, possible slight change in R vision. Pt reports she is fatigued, but has cardiology tests tomorrow. OT recommends home with assist from family to manage household and assist spouse.      Additional Area 4 Volume In Cc: 0 Anesthesia Type: 1% lidocaine with epinephrine Post-Care Instructions: Patient instructed to apply ice to reduce swelling. Written instructions given to new filler patients. Include Cannula Information In Note?: No Anesthesia Volume In Cc: 0.5 Filler: Restylane Kysse Additional Anesthesia Volume In Cc: 6 Detail Level: Detailed Lot #: [id number] Expiration Date (Month Year): 06/30/2022 Map Statment: See Attach Map for Complete Details Consent: Written consent obtained. Risks include but not limited to bruising, beading, irregular texture, ulceration, infection, allergic reaction, scar formation, incomplete augmentation, temporary nature, procedural pain.

## 2021-03-08 RX ORDER — ESOMEPRAZOLE MAGNESIUM 40 MG/1
40 CAPSULE, DELAYED RELEASE ORAL
Qty: 30 CAPSULE | Refills: 0 | Status: SHIPPED | OUTPATIENT
Start: 2021-03-08 | End: 2021-05-28

## 2021-04-06 NOTE — TELEPHONE ENCOUNTER
----- Message from Nicanor Vega III, MD sent at 4/6/2021 10:44 AM EDT -----  Loop recorder revealed atrial fibrillation.  She needs to start Eliquis 5 mg p.o. twice daily.  Discontinue aspirin.

## 2021-04-06 NOTE — TELEPHONE ENCOUNTER
Notified of message above from . Verbalized understanding. Requested Eliquis Rx be sent to her local pharmacy.

## 2021-04-10 PROCEDURE — 93298 REM INTERROG DEV EVAL SCRMS: CPT | Performed by: INTERNAL MEDICINE

## 2021-04-10 PROCEDURE — G2066 INTER DEVC REMOTE 30D: HCPCS | Performed by: INTERNAL MEDICINE

## 2021-05-28 RX ORDER — ESOMEPRAZOLE MAGNESIUM 40 MG/1
CAPSULE, DELAYED RELEASE ORAL
Qty: 30 CAPSULE | Refills: 1 | Status: SHIPPED | OUTPATIENT
Start: 2021-05-28

## 2021-06-11 PROCEDURE — 93298 REM INTERROG DEV EVAL SCRMS: CPT | Performed by: INTERNAL MEDICINE

## 2021-06-11 PROCEDURE — G2066 INTER DEVC REMOTE 30D: HCPCS | Performed by: INTERNAL MEDICINE

## 2021-07-08 RX ORDER — CLOPIDOGREL BISULFATE 75 MG/1
75 TABLET ORAL DAILY
Qty: 90 TABLET | Refills: 1 | Status: CANCELLED | OUTPATIENT
Start: 2021-07-08

## 2021-07-08 NOTE — TELEPHONE ENCOUNTER
PT NEEDS PLAVIX 75 MG REFILLED HAS ONE DAY LEFT USING [facility] AS SELECTED IN CHART. CALLED OVER TO OFFICE AS THIS IS A STROKE PT AND A IMPORTANT MEDICATION SHE IS SCHEDULED IN 7/23/21    MARK IS NOTIFYING CHRISTINE THAT THIS IS URGENT.

## 2021-07-08 NOTE — TELEPHONE ENCOUNTER
Spoke to patient and informed her that Dr. Araiza doesn't prescribe her Plavix, Dr. Vega from cardiology is the one that prescribes it to her. Patient stated understanding and is calling Dr. Vega's office for assistance.

## 2021-07-09 RX ORDER — CLOPIDOGREL BISULFATE 75 MG/1
75 TABLET ORAL DAILY
Qty: 90 TABLET | Refills: 0 | Status: SHIPPED | OUTPATIENT
Start: 2021-07-09 | End: 2021-09-27

## 2021-07-23 ENCOUNTER — OFFICE VISIT (OUTPATIENT)
Dept: NEUROLOGY | Facility: CLINIC | Age: 76
End: 2021-07-23

## 2021-07-23 VITALS
HEIGHT: 66 IN | HEART RATE: 50 BPM | SYSTOLIC BLOOD PRESSURE: 122 MMHG | DIASTOLIC BLOOD PRESSURE: 78 MMHG | WEIGHT: 166.8 LBS | OXYGEN SATURATION: 97 % | BODY MASS INDEX: 26.81 KG/M2

## 2021-07-23 DIAGNOSIS — I69.30 SEQUELAE, POST-STROKE: Primary | ICD-10-CM

## 2021-07-23 PROCEDURE — 99213 OFFICE O/P EST LOW 20 MIN: CPT | Performed by: PSYCHIATRY & NEUROLOGY

## 2021-07-23 NOTE — PROGRESS NOTES
Subjective:    CC: Yumi Crooks is seen today  for stroke    HPI:  Current visit-patient denies having any new strokelike symptoms.  She continues to have slightly blurred vision in her right eye.  Her loop recorder showed an episode of atrial fibrillation.  After that her cardiologist Dr. Vega switched her from aspirin to Eliquis 5 mg twice a day that she started taking in addition to Plavix.  Is also taking Crestor 5 mg daily.  Her myalgias have improved but she still has some leg cramps on walking.  She feels that she has a short-term memory problems however as per her daughter she has too much on her mind as she also takes care of her .      Last visit-patient states she has not had any new strokelike symptoms since her last visit.  Her previous symptoms of blurred vision and right arm numbness have now subsided.  She continues to take dual antiplatelets along with Crestor 10 mg.  She tried cutting down her pill to 5 mg but was unable to.  Is tolerating it better now.  Her last lipid panel was as follows-, , LDL 78, HDL 45.  CK was 210.  B12 and vitamin D levels were normal.  She also had a Holter monitor that showed PVCs and PACs but no atrial fibrillation.  Has a loop recorder in place now.    Initial yobym-70-cnny-old female accompanied by her daughter with a history of hypertension, hyperlipidemia, breast cancer status post mastectomy, anxiety, depression, arthritis presents as a hospital follow-up for stroke.  As per patient she had symptoms of blurred vision along with right arm numbness and a headache on 10/14.  She was initially taken to an outside hospital and subsequently transferred here.  Her initial blood pressure was 195/104..  Initial CT scan of the head showed arachnoid cyst in the left temporal lobe but no any acute abnormalities.  She had a CT angiogram of the head and neck that showed right distal vertebral occlusion as well as left P1/P2 occlusion with no significant  intracarotid stenosis.  MRI brain showed acute infarcts in the left medial occipital lobe as well as an splenium of corpus callosum.  EEG was normal. VIDAL showed a normal EF with left ventricular concentric hypertrophy but normal left atrial appendage thrombus or PFO.  She was started on dual antiplatelets as well as Lipitor 80 mg daily.  Her lipid panel was as follows-, , , HDL 30.  A1c was 5.7.  Patient could not tolerate Lipitor and she was recently switched to Crestor 10 mg daily by her cardiologist but is unable to tolerate that as well as she is having pain in her legs.  She also feels tired at all times.  Had a Holter monitor after discharge the results of which are still pending.  Her systolic blood pressure at home is still running high in the 140s and 150s.  Of note-I personally reviewed her MRI brain and Dr. Jim's's notes.    The following portions of the patient's history were reviewed today and updated as of 11/12/2020  : allergies, current medications, past family history, past medical history, past social history, past surgical history and problem list  These document will be scanned to patient's chart.      Current Outpatient Medications:   •  amLODIPine (NORVASC) 10 MG tablet, Take 10 mg by mouth Daily., Disp: , Rfl:   •  anastrozole (ARIMIDEX) 1 MG tablet, Take 1 mg by mouth Daily., Disp: , Rfl:   •  apixaban (Eliquis) 5 MG tablet tablet, Take 1 tablet by mouth 2 (Two) Times a Day., Disp: 60 tablet, Rfl: 5  •  busPIRone (BUSPAR) 5 MG tablet, Take 5 mg by mouth 2 (two) times a day., Disp: , Rfl:   •  carvedilol (COREG) 3.125 MG tablet, Take 3.125 mg by mouth 2 (Two) Times a Day With Meals., Disp: , Rfl:   •  clopidogrel (PLAVIX) 75 MG tablet, Take 1 tablet by mouth Daily., Disp: 90 tablet, Rfl: 0  •  esomeprazole (nexIUM) 40 MG capsule, TAKE 1 CAPSULE BY MOUTH IN THE MORNING BEFORE BREAKFAST, Disp: 30 capsule, Rfl: 1  •  losartan (COZAAR) 100 MG tablet, Take 100 mg by mouth  "Daily., Disp: , Rfl:   •  Multiple Vitamins-Minerals (multivitamin, eye vitamin,) tablet tablet, Take 1 tablet by mouth Daily., Disp: , Rfl:   •  multivitamin with minerals (MULTIVITAMIN ADULTS PO), Take 1 tablet by mouth Daily., Disp: , Rfl:   •  pramipexole (MIRAPEX) 0.125 MG tablet, Take 0.125 mg by mouth Every Night., Disp: , Rfl:   •  rosuvastatin (Crestor) 5 MG tablet, Take 1 tablet by mouth Every Night., Disp: 90 tablet, Rfl: 3   Past Medical History:   Diagnosis Date   • Dyslipidemia    • Hypertension       Past Surgical History:   Procedure Laterality Date   • BREAST BIOPSY Right     2015   • CARDIAC ELECTROPHYSIOLOGY PROCEDURE N/A 12/17/2020    Procedure: Loop insertion;  Surgeon: Nicanor Vega III, MD;  Location: [facility] INVASIVE LOCATION;  Service: Cardiovascular;  Laterality: N/A;      Family History   Problem Relation Age of Onset   • Breast cancer Neg Hx    • Ovarian cancer Neg Hx       Social History     Socioeconomic History   • Marital status:      Spouse name: Not on file   • Number of children: Not on file   • Years of education: Not on file   • Highest education level: Not on file   Tobacco Use   • Smoking status: Never Smoker   • Smokeless tobacco: Never Used   Substance and Sexual Activity   • Alcohol use: Not Currently   • Drug use: Never   • Sexual activity: Defer     Review of Systems   Musculoskeletal: Positive for myalgias.   All other systems reviewed and are negative.      Objective:    /78   Pulse 50   Ht 167.6 cm (65.98\")   Wt 75.7 kg (166 lb 12.8 oz)   SpO2 97%   BMI 26.94 kg/m²     Neurology Exam:    General apperance: NAD.     Mental status: Alert, awake and oriented to time place and person.    Recent and Remote memory: Intact.    Attention span and Concentration: Normal.     Language and Speech: Intact- No dysarthria.    Fluency, Naming , Repitition and Comprehension:  Intact    Cranial Nerves:   CN II: Visual fields are full. Intact. Fundi - Normal, No " papillederma, Pupils - CRISTAL  CN III, IV and VI: Extraocular movements are intact. Normal saccades.   CN V: Facial sensation is intact.   CN VII: Muscles of facial expression reveal no asymmetry. Intact.   CN VIII: Hearing is intact. Whispered voice intact.   CN IX and X: Palate elevates symmetrically. Intact  CN XI: Shoulder shrug is intact.   CN XII: Tongue is midline without evidence of atrophy or fasciculation.     Ophthalmoscopic exam of optic disc-normal    Motor:  Right UE muscle strength 5/5. Normal tone.     Left UE muscle strength 5/5. Normal tone.      Right LE muscle strength5/5. Normal tone.     Left LE muscle strength 5/5. Normal tone.      Sensory: Normal light touch, vibration and pinprick sensation bilaterally.    DTRs: 2+ bilaterally in upper and lower extremities.    Babinski: Negative bilaterally.    Co-ordination: Normal finger-to-nose, heel to shin B/L.    Rhomberg: Negative.    Gait: Normal.  Could do tandem walking    Cardiovascular: Regular rate and rhythm without murmur, gallop or rub.    Assessment and Plan:  1. Cerebrovascular accident (CVA), unspecified mechanism (CMS/HCC)  Patient had acute infarcts in the left PCA territory along with left P1/P2 occlusion and distal right vertebral occlusion.  Holter monitor showed PACs and PVCs but no A. fib.  Her loop recorder showed paroxysmal atrial fibrillation  She is now on Plavix 75 mg in addition to Eliquis 5 mg twice a day  She can continue Crestor 5 mg daily as she had myalgias with the higher dose.  Goal LDL of less than 100 her LDL was 78.  Her PCP did blood work recently which she will fax over to me  Goal blood pressure less than 130/90.  I have told her to monitor her blood pressure regularly  I have also counseled her to carry out dietary modifications for her prediabetes.  A1c of 5.7    2.  Mild cognitive problems  She complains of mild memory problems since the stroke.  I have once again told her to carry out physical and mental  activities  I have also told her to make lists to avoid remembering too many things at the same time     Addendum-most recent lipid panel done on 11/29 was as follows-, , , HDL 47.  Glucose was 99, normal sodium, normal LFTs and GFR of 57.    Return in about 1 year (around 7/23/2022).       Berna Araiza MD

## 2021-08-05 RX ORDER — ESOMEPRAZOLE MAGNESIUM 40 MG/1
CAPSULE, DELAYED RELEASE ORAL
Qty: 30 CAPSULE | Refills: 0 | OUTPATIENT
Start: 2021-08-05

## 2021-08-25 ENCOUNTER — OFFICE VISIT (OUTPATIENT)
Dept: CARDIOLOGY | Facility: CLINIC | Age: 76
End: 2021-08-25

## 2021-08-25 VITALS
DIASTOLIC BLOOD PRESSURE: 74 MMHG | BODY MASS INDEX: 26.03 KG/M2 | WEIGHT: 162 LBS | OXYGEN SATURATION: 98 % | SYSTOLIC BLOOD PRESSURE: 138 MMHG | HEART RATE: 59 BPM | HEIGHT: 66 IN

## 2021-08-25 DIAGNOSIS — E78.2 MIXED HYPERLIPIDEMIA: Primary | ICD-10-CM

## 2021-08-25 DIAGNOSIS — I10 ESSENTIAL HYPERTENSION: ICD-10-CM

## 2021-08-25 PROCEDURE — 93291 INTERROG DEV EVAL SCRMS IP: CPT | Performed by: INTERNAL MEDICINE

## 2021-08-25 PROCEDURE — 99214 OFFICE O/P EST MOD 30 MIN: CPT | Performed by: INTERNAL MEDICINE

## 2021-08-25 NOTE — PROGRESS NOTES
[facility]  Office visit  Yumi Crooks  1945  [phone]  There is no work phone number on file.    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 Left ventricular systolic function is normal.  · Left ventricular wall thickness is consistent with concentric hypertrophy.  · No evidence of a left atrial appendage thrombus  · Saline test results are negative for right to left atrial level shunt.    ASSESSMENT:   Diagnosis Plan   1. Mixed hyperlipidemia     2. Essential hypertension       Loop recorder interrogation: [employer].  Normal, no arrhythmia.    PLAN:  CVA:  Uncovered paroxysmal A. fib on loop recorder in April of this year.  Continue combination of Eliquis and clopidogrel.  On max tolerated statin.  Afterload well controlled.    Hypertension: Goal less than 130/80 mmHg.  Continue current medical therapy.  Patient will keep a blood pressure log.     Hyperlipidemia: Goal LDL less than 70.    Continue rosuvastatin 5 mg p.o. daily.    Excellent response to statin therapy.    Subjective  Assessment: Stable functional capacity.  Blood pressures running less than 130/80 mmHg.  She is compliant with medical therapy.    Initial evaluation:75-year-old female with a history of breast cancer status postmastectomy, on Arimidex, hypertension, dyslipidemia presenting with worsening right arm numbness with a evaluation revealing evidence of multiple small infarcts in the left splenium of the corpus callosum and medial left occipital.  VIDAL unremarkable.   "Inpatient telemetry unremarkable.  Ambulatory ECG monitor pending.  She was on aspirin at the time of her stroke.  Complaining of fatigue, nausea, intermittent mild headaches, intermittent episodes of sudden onset dizziness, and intermittent nocturnal leg cramps.  She denies chest pain, dyspnea exertion or palpitations.  Blood pressures running less than 140/90 mmHg.  Atorvastatin elicited myalgias.  She is compliant with medical therapy.    PHYSICAL EXAMINATION:  Vitals:    08/25/21 1533   BP: 138/74   BP Location: Left arm   Patient Position: Sitting   Pulse: 59   SpO2: 98%   Weight: 73.5 kg (162 lb)   Height: 167.6 cm (66\")     General Appearance:    Alert, cooperative, no distress, appears stated age   Head:    Normocephalic, without obvious abnormality, atraumatic   Eyes:    conjunctiva/corneas clear   Nose:   Nares normal, septum midline, mucosa normal, no drainage   Throat:   Lips, teeth and gums normal   Neck:   Supple, symmetrical, trachea midline, no carotid    bruit or JVD   Lungs:     Clear to auscultation bilaterally, respirations unlabored   Chest Wall:    No tenderness or deformity    Heart:    Regular rate and rhythm, S1 and S2 normal, no murmur, rub   or gallop, normal carotid impulse bilaterally without bruit.   Abdomen:     Soft, non-tender   Extremities:   Extremities normal, atraumatic, no cyanosis or edema   Pulses:   2+ and symmetric all extremities   Skin:   Skin color, texture, turgor normal, no rashes or lesions       Diagnostic Data:  Procedures  Lab Results   Component Value Date    CHLPL 150 11/13/2020    TRIG 154 (H) 11/13/2020    HDL 45 11/13/2020     Lab Results   Component Value Date    GLUCOSE 99 12/17/2020    BUN 23 12/17/2020    CREATININE 0.89 12/17/2020     12/17/2020    K 4.0 12/17/2020     12/17/2020    CO2 21.0 (L) 12/17/2020     Lab Results   Component Value Date    HGBA1C 5.70 (H) 10/15/2020     Lab Results   Component Value Date    WBC 7.31 12/17/2020    HGB " "13.3 12/17/2020    HCT 40.2 12/17/2020     12/17/2020       Allergies  Allergies   Allergen Reactions   • Lipitor [Atorvastatin] Myalgia   • Lortab [Hydrocodone-Acetaminophen] Other (See Comments)     \"hyperventilate\"   • Penicillins Other (See Comments)     Had too much as a kid   • Sulfa Antibiotics Other (See Comments)      \"too much as a kid...and, it ruined my teeth\"       Current Medications    Current Outpatient Medications:   •  amLODIPine (NORVASC) 10 MG tablet, Take 10 mg by mouth Daily., Disp: , Rfl:   •  anastrozole (ARIMIDEX) 1 MG tablet, Take 1 mg by mouth Daily., Disp: , Rfl:   •  apixaban (Eliquis) 5 MG tablet tablet, Take 1 tablet by mouth 2 (Two) Times a Day., Disp: 60 tablet, Rfl: 5  •  busPIRone (BUSPAR) 5 MG tablet, Take 5 mg by mouth 2 (two) times a day., Disp: , Rfl:   •  carvedilol (COREG) 3.125 MG tablet, Take 3.125 mg by mouth 2 (Two) Times a Day With Meals., Disp: , Rfl:   •  clopidogrel (PLAVIX) 75 MG tablet, Take 1 tablet by mouth Daily., Disp: 90 tablet, Rfl: 0  •  Coenzyme Q10 (COQ10 PO), Take 1 tablet by mouth Daily., Disp: , Rfl:   •  esomeprazole (nexIUM) 40 MG capsule, TAKE 1 CAPSULE BY MOUTH IN THE MORNING BEFORE BREAKFAST, Disp: 30 capsule, Rfl: 1  •  losartan (COZAAR) 100 MG tablet, Take 100 mg by mouth Daily., Disp: , Rfl:   •  MELATONIN PO, Take 10 mg by mouth Daily., Disp: , Rfl:   •  Multiple Vitamins-Minerals (multivitamin, eye vitamin,) tablet tablet, Take 1 tablet by mouth Daily., Disp: , Rfl:   •  multivitamin with minerals (MULTIVITAMIN ADULTS PO), Take 1 tablet by mouth Daily., Disp: , Rfl:   •  pramipexole (MIRAPEX) 0.125 MG tablet, Take 0.125 mg by mouth Every Night., Disp: , Rfl:   •  rosuvastatin (Crestor) 5 MG tablet, Take 1 tablet by mouth Every Night., Disp: 90 tablet, Rfl: 3          ROS  ROS      SOCIAL HX  Social History     Socioeconomic History   • Marital status:      Spouse name: Not on file   • Number of children: Not on file   • Years of " education: Not on file   • Highest education level: Not on file   Tobacco Use   • Smoking status: Never Smoker   • Smokeless tobacco: Never Used   Substance and Sexual Activity   • Alcohol use: Not Currently   • Drug use: Never   • Sexual activity: Defer       FAMILY HX  Family History   Problem Relation Age of Onset   • Breast cancer Neg Hx    • Ovarian cancer Neg Hx              Nicanor Vega III, MD, FACC

## 2021-09-14 PROCEDURE — G2066 INTER DEVC REMOTE 30D: HCPCS | Performed by: INTERNAL MEDICINE

## 2021-09-14 PROCEDURE — 93298 REM INTERROG DEV EVAL SCRMS: CPT | Performed by: INTERNAL MEDICINE

## 2021-09-27 RX ORDER — CLOPIDOGREL BISULFATE 75 MG/1
TABLET ORAL
Qty: 90 TABLET | Refills: 0 | Status: SHIPPED | OUTPATIENT
Start: 2021-09-27 | End: 2021-10-04

## 2021-10-04 RX ORDER — CLOPIDOGREL BISULFATE 75 MG/1
TABLET ORAL
Qty: 90 TABLET | Refills: 1 | Status: SHIPPED | OUTPATIENT
Start: 2021-10-04 | End: 2022-07-11

## 2021-10-04 RX ORDER — APIXABAN 5 MG/1
TABLET, FILM COATED ORAL
Qty: 60 TABLET | Refills: 5 | Status: SHIPPED | OUTPATIENT
Start: 2021-10-04 | End: 2022-04-07

## 2021-10-11 ENCOUNTER — TELEPHONE (OUTPATIENT)
Dept: CARDIOLOGY | Facility: CLINIC | Age: 76
End: 2021-10-11

## 2021-10-11 NOTE — TELEPHONE ENCOUNTER
HR has been averaging @ 49-53 for the last week. She is concerned that it's to  low for her. B/P averaging @ 119//73. Feels tired and lightheaded. Has felt this way for awhile. No changes in her medications. Please advise.

## 2021-11-22 RX ORDER — ROSUVASTATIN CALCIUM 5 MG/1
TABLET, COATED ORAL
Qty: 90 TABLET | Refills: 3 | Status: SHIPPED | OUTPATIENT
Start: 2021-11-22 | End: 2022-11-02

## 2021-12-16 PROCEDURE — 93298 REM INTERROG DEV EVAL SCRMS: CPT | Performed by: INTERNAL MEDICINE

## 2021-12-16 PROCEDURE — G2066 INTER DEVC REMOTE 30D: HCPCS | Performed by: INTERNAL MEDICINE

## 2022-01-16 PROCEDURE — 93298 REM INTERROG DEV EVAL SCRMS: CPT | Performed by: INTERNAL MEDICINE

## 2022-01-16 PROCEDURE — G2066 INTER DEVC REMOTE 30D: HCPCS | Performed by: INTERNAL MEDICINE

## 2022-01-20 ENCOUNTER — TELEPHONE (OUTPATIENT)
Dept: CARDIOLOGY | Facility: CLINIC | Age: 77
End: 2022-01-20

## 2022-01-20 NOTE — TELEPHONE ENCOUNTER
Dona at 's office is requesting a cardiac risk assessment and to hold Eliquis 2 days prior to procedure. She is scheduled on 2/8/2022 for a colonoscopy and EGD. Please advise.

## 2022-02-16 PROCEDURE — 93298 REM INTERROG DEV EVAL SCRMS: CPT | Performed by: INTERNAL MEDICINE

## 2022-02-16 PROCEDURE — G2066 INTER DEVC REMOTE 30D: HCPCS | Performed by: INTERNAL MEDICINE

## 2022-02-28 ENCOUNTER — OFFICE VISIT (OUTPATIENT)
Dept: CARDIOLOGY | Facility: CLINIC | Age: 77
End: 2022-02-28

## 2022-02-28 VITALS
HEIGHT: 66 IN | HEART RATE: 62 BPM | SYSTOLIC BLOOD PRESSURE: 134 MMHG | DIASTOLIC BLOOD PRESSURE: 74 MMHG | WEIGHT: 170 LBS | BODY MASS INDEX: 27.32 KG/M2 | OXYGEN SATURATION: 97 %

## 2022-02-28 DIAGNOSIS — I10 ESSENTIAL HYPERTENSION: ICD-10-CM

## 2022-02-28 DIAGNOSIS — E78.2 MIXED HYPERLIPIDEMIA: Primary | ICD-10-CM

## 2022-02-28 PROCEDURE — 93291 INTERROG DEV EVAL SCRMS IP: CPT | Performed by: INTERNAL MEDICINE

## 2022-02-28 PROCEDURE — 99213 OFFICE O/P EST LOW 20 MIN: CPT | Performed by: INTERNAL MEDICINE

## 2022-02-28 RX ORDER — AMLODIPINE BESYLATE 10 MG/1
10 TABLET ORAL EVERY MORNING
Start: 2022-02-28 | End: 2023-03-02 | Stop reason: DRUGHIGH

## 2022-02-28 RX ORDER — LOSARTAN POTASSIUM 100 MG/1
50 TABLET ORAL
Status: ON HOLD
Start: 2022-02-28 | End: 2022-09-27

## 2022-02-28 NOTE — PROGRESS NOTES
[facility]  Office visit  Yumi Crooks  1945  [phone]  There is no work phone number on file.    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 Left ventricular systolic function is normal.  · Left ventricular wall thickness is consistent with concentric hypertrophy.  · No evidence of a left atrial appendage thrombus  · Saline test results are negative for right to left atrial level shunt.    ASSESSMENT:   Diagnosis Plan   1. Mixed hyperlipidemia     2. Essential hypertension       Loop recorder interrogation: [employer].  Normal, no arrhythmia.    PLAN:  CVA:  Uncovered paroxysmal A. fib on loop recorder in April 2021.  Continue combination of Eliquis and clopidogrel.  On max tolerated statin.  Afterload well controlled.  I would be okay with weaning off antiplatelet therapy if neurology agrees.    Hypertension: Goal less than 130/80 mmHg.    Intermittent symptomatic hypotension.  Decreasing losartan to 50 mg p.o. nightly, moving amlodipine to a.m. dosing.     Hyperlipidemia: Goal LDL less than 70.    Continue rosuvastatin 5 mg p.o. daily.    Excellent response to statin therapy.    Subjective  Assessment: Stable functional capacity.  Blood pressures running less than 130/80 mmHg.  She is compliant with medical therapy.  Complaining of some positional numbness in her left arm when she is lying in bed at night.  Denies easy bruising or bleeding complications.    Initial evaluation:75-year-old female with a history of breast cancer status  "postmastectomy, on Arimidex, hypertension, dyslipidemia presenting with worsening right arm numbness with a evaluation revealing evidence of multiple small infarcts in the left splenium of the corpus callosum and medial left occipital.  VIDAL unremarkable.  Inpatient telemetry unremarkable.  Ambulatory ECG monitor pending.  She was on aspirin at the time of her stroke.  Complaining of fatigue, nausea, intermittent mild headaches, intermittent episodes of sudden onset dizziness, and intermittent nocturnal leg cramps.  She denies chest pain, dyspnea exertion or palpitations.  Blood pressures running less than 140/90 mmHg.  Atorvastatin elicited myalgias.  She is compliant with medical therapy.    PHYSICAL EXAMINATION:  Vitals:    02/28/22 1535   BP: 134/74   BP Location: Left arm   Patient Position: Sitting   Cuff Size: Adult   Pulse: 62   SpO2: 97%   Weight: 77.1 kg (170 lb)   Height: 167.6 cm (66\")     General Appearance:    Alert, cooperative, no distress, appears stated age   Head:    Normocephalic, without obvious abnormality, atraumatic   Eyes:    conjunctiva/corneas clear   Nose:   Nares normal, septum midline, mucosa normal, no drainage   Throat:   Lips, teeth and gums normal   Neck:   Supple, symmetrical, trachea midline, no carotid    bruit or JVD   Lungs:     Clear to auscultation bilaterally, respirations unlabored   Chest Wall:    No tenderness or deformity    Heart:    Regular rate and rhythm, S1 and S2 normal, no murmur, rub   or gallop, normal carotid impulse bilaterally without bruit.   Abdomen:     Soft, non-tender   Extremities:   Extremities normal, atraumatic, no cyanosis or edema   Pulses:   2+ and symmetric all extremities   Skin:   Skin color, texture, turgor normal, no rashes or lesions       Diagnostic Data:  Procedures  Lab Results   Component Value Date    CHLPL 150 11/13/2020    TRIG 154 (H) 11/13/2020    HDL 45 11/13/2020     Lab Results   Component Value Date    GLUCOSE 99 12/17/2020    " "BUN 23 12/17/2020    CREATININE 0.89 12/17/2020     12/17/2020    K 4.0 12/17/2020     12/17/2020    CO2 21.0 (L) 12/17/2020     Lab Results   Component Value Date    HGBA1C 5.70 (H) 10/15/2020     Lab Results   Component Value Date    WBC 7.31 12/17/2020    HGB 13.3 12/17/2020    HCT 40.2 12/17/2020     12/17/2020       Allergies  Allergies   Allergen Reactions   • Lortab [Hydrocodone-Acetaminophen] Other (See Comments)     \"hyperventilate\"   • Lipitor [Atorvastatin] Myalgia   • Penicillins Other (See Comments)     Had too much as a kid   • Sulfa Antibiotics Other (See Comments)      \"too much as a kid...and, it ruined my teeth\"       Current Medications    Current Outpatient Medications:   •  amLODIPine (NORVASC) 10 MG tablet, Take 10 mg by mouth Daily., Disp: , Rfl:   •  anastrozole (ARIMIDEX) 1 MG tablet, Take 1 mg by mouth Daily., Disp: , Rfl:   •  busPIRone (BUSPAR) 5 MG tablet, Take 5 mg by mouth 2 (two) times a day., Disp: , Rfl:   •  clopidogrel (PLAVIX) 75 MG tablet, Take 1 tablet by mouth once daily, Disp: 90 tablet, Rfl: 1  •  Coenzyme Q10 (COQ10 PO), Take 1 tablet by mouth Daily., Disp: , Rfl:   •  Eliquis 5 MG tablet tablet, Take 1 tablet by mouth twice daily, Disp: 60 tablet, Rfl: 5  •  esomeprazole (nexIUM) 40 MG capsule, TAKE 1 CAPSULE BY MOUTH IN THE MORNING BEFORE BREAKFAST, Disp: 30 capsule, Rfl: 1  •  losartan (COZAAR) 100 MG tablet, Take 100 mg by mouth Daily., Disp: , Rfl:   •  MELATONIN PO, Take 10 mg by mouth Daily., Disp: , Rfl:   •  Multiple Vitamins-Minerals (multivitamin, eye vitamin,) tablet tablet, Take 1 tablet by mouth Daily., Disp: , Rfl:   •  multivitamin with minerals (MULTIVITAMIN ADULTS PO), Take 1 tablet by mouth Daily., Disp: , Rfl:   •  pramipexole (MIRAPEX) 0.125 MG tablet, Take 0.125 mg by mouth Every Night., Disp: , Rfl:   •  rosuvastatin (CRESTOR) 5 MG tablet, TAKE 1 TABLET EVERY NIGHT, Disp: 90 tablet, Rfl: 3          ROS  ROS      SOCIAL HX  Social " History     Socioeconomic History   • Marital status:    Tobacco Use   • Smoking status: Never Smoker   • Smokeless tobacco: Never Used   Vaping Use   • Vaping Use: Never used   Substance and Sexual Activity   • Alcohol use: Not Currently   • Drug use: Never   • Sexual activity: Defer       FAMILY HX  Family History   Problem Relation Age of Onset   • Breast cancer Neg Hx    • Ovarian cancer Neg Hx              Nicanor Vega III, MD, FACC

## 2022-03-19 PROCEDURE — 93298 REM INTERROG DEV EVAL SCRMS: CPT | Performed by: INTERNAL MEDICINE

## 2022-03-19 PROCEDURE — G2066 INTER DEVC REMOTE 30D: HCPCS | Performed by: INTERNAL MEDICINE

## 2022-04-07 RX ORDER — APIXABAN 5 MG/1
TABLET, FILM COATED ORAL
Qty: 60 TABLET | Refills: 0 | Status: SHIPPED | OUTPATIENT
Start: 2022-04-07 | End: 2022-05-12

## 2022-05-12 RX ORDER — APIXABAN 5 MG/1
TABLET, FILM COATED ORAL
Qty: 60 TABLET | Refills: 0 | Status: SHIPPED | OUTPATIENT
Start: 2022-05-12 | End: 2022-06-09

## 2022-05-20 PROCEDURE — 93298 REM INTERROG DEV EVAL SCRMS: CPT | Performed by: INTERNAL MEDICINE

## 2022-05-20 PROCEDURE — G2066 INTER DEVC REMOTE 30D: HCPCS | Performed by: INTERNAL MEDICINE

## 2022-06-09 RX ORDER — APIXABAN 5 MG/1
TABLET, FILM COATED ORAL
Qty: 60 TABLET | Refills: 1 | Status: SHIPPED | OUTPATIENT
Start: 2022-06-09 | End: 2022-08-08

## 2022-07-11 RX ORDER — CLOPIDOGREL BISULFATE 75 MG/1
TABLET ORAL
Qty: 90 TABLET | Refills: 0 | Status: SHIPPED | OUTPATIENT
Start: 2022-07-11 | End: 2022-08-24 | Stop reason: SDUPTHER

## 2022-07-15 ENCOUNTER — TELEPHONE (OUTPATIENT)
Dept: CARDIOLOGY | Facility: CLINIC | Age: 77
End: 2022-07-15

## 2022-07-15 DIAGNOSIS — I49.5 SICK SINUS SYNDROME: Primary | ICD-10-CM

## 2022-07-15 NOTE — TELEPHONE ENCOUNTER
----- Message from Nicanor Vega III, MD sent at 7/15/2022  8:42 AM EDT -----  Based on loop recorder monitoring would recommend evaluation with EP.  Diagnosis tachybradycardia syndrome, sick sinus syndrome.

## 2022-07-22 ENCOUNTER — OFFICE VISIT (OUTPATIENT)
Dept: NEUROLOGY | Facility: CLINIC | Age: 77
End: 2022-07-22

## 2022-07-22 ENCOUNTER — LAB (OUTPATIENT)
Dept: LAB | Facility: HOSPITAL | Age: 77
End: 2022-07-22

## 2022-07-22 VITALS
SYSTOLIC BLOOD PRESSURE: 128 MMHG | WEIGHT: 161 LBS | DIASTOLIC BLOOD PRESSURE: 78 MMHG | BODY MASS INDEX: 25.88 KG/M2 | HEIGHT: 66 IN | OXYGEN SATURATION: 93 % | HEART RATE: 68 BPM

## 2022-07-22 DIAGNOSIS — I69.30 SEQUELAE, POST-STROKE: Primary | ICD-10-CM

## 2022-07-22 DIAGNOSIS — M79.10 MYALGIA: ICD-10-CM

## 2022-07-22 DIAGNOSIS — I69.30 SEQUELAE, POST-STROKE: ICD-10-CM

## 2022-07-22 PROCEDURE — 99214 OFFICE O/P EST MOD 30 MIN: CPT | Performed by: PSYCHIATRY & NEUROLOGY

## 2022-07-22 NOTE — PROGRESS NOTES
Subjective:    CC: Yumi Crooks is seen today  for stroke    HPI:  Current visit- patient denies any new strokelike symptoms however her last loop recorder tracings showed several episodes of bradycardia with the longest being 42 seconds with a heart rate dropping down to 40 bpm.  She has an upcoming appointment with cardiology to see if she would be a candidate for pacemaker.  Reports to being tired all day long.  She also sustained a major fall in May when she slipped in the bathroom at night and had a big hematoma across her left hip.  Since then she has had right shoulder pain and left hip pain.  Has had several x-rays that did not show any evidence of fracture.  She continues to take Plavix along with Eliquis and Crestor 5 mg daily.  Her blood pressure is running within normal limits.  Addendum- last lipid panel done on 11/29 was as follows-, , , HDL 47. Glucose was 99, normal sodium, normal LFTs and GFR of 57.    Last visit-patient denies having any new strokelike symptoms.  She continues to have slightly blurred vision in her right eye.  Her loop recorder showed an episode of atrial fibrillation.  After that her cardiologist Dr. Vega switched her from aspirin to Eliquis 5 mg twice a day that she started taking in addition to Plavix.  Is also taking Crestor 5 mg daily.  Her myalgias have improved but she still has some leg cramps on walking.  She feels that she has a short-term memory problems however as per her daughter she has too much on her mind as she also takes care of her .      Last visit-patient states she has not had any new strokelike symptoms since her last visit.  Her previous symptoms of blurred vision and right arm numbness have now subsided.  She continues to take dual antiplatelets along with Crestor 10 mg.  She tried cutting down her pill to 5 mg but was unable to.  Is tolerating it better now.  Her last lipid panel was as follows-, , LDL 78, HDL  45.  CK was 210.  B12 and vitamin D levels were normal.  She also had a Holter monitor that showed PVCs and PACs but no atrial fibrillation.  Has a loop recorder in place now.    Initial [id number] female accompanied by her daughter with a history of hypertension, hyperlipidemia, breast cancer status post mastectomy, anxiety, depression, arthritis presents as a hospital follow-up for stroke.  As per patient she had symptoms of blurred vision along with right arm numbness and a headache on 10/14.  She was initially taken to an outside hospital and subsequently transferred here.  Her initial blood pressure was 195/104..  Initial CT scan of the head showed arachnoid cyst in the left temporal lobe but no any acute abnormalities.  She had a CT angiogram of the head and neck that showed right distal vertebral occlusion as well as left P1/P2 occlusion with no significant intracarotid stenosis.  MRI brain showed acute infarcts in the left medial occipital lobe as well as an splenium of corpus callosum.  EEG was normal. VIDAL showed a normal EF with left ventricular concentric hypertrophy but normal left atrial appendage thrombus or PFO.  She was started on dual antiplatelets as well as Lipitor 80 mg daily.  Her lipid panel was as follows-, , , HDL 30.  A1c was 5.7.  Patient could not tolerate Lipitor and she was recently switched to Crestor 10 mg daily by her cardiologist but is unable to tolerate that as well as she is having pain in her legs.  She also feels tired at all times.  Had a Holter monitor after discharge the results of which are still pending.  Her systolic blood pressure at home is still running high in the 140s and 150s.  Of note-I personally reviewed her MRI brain and Dr. Jim's's notes.    The following portions of the patient's history were reviewed today and updated as of 11/12/2020  : allergies, current medications, past family history, past medical history, past social  history, past surgical history and problem list  These document will be scanned to patient's chart.      Current Outpatient Medications:   •  amLODIPine (NORVASC) 10 MG tablet, Take 1 tablet by mouth Every Morning., Disp: , Rfl:   •  anastrozole (ARIMIDEX) 1 MG tablet, Take 1 mg by mouth Daily., Disp: , Rfl:   •  busPIRone (BUSPAR) 5 MG tablet, Take 5 mg by mouth 2 (two) times a day., Disp: , Rfl:   •  clopidogrel (PLAVIX) 75 MG tablet, Take 1 tablet by mouth once daily, Disp: 90 tablet, Rfl: 0  •  Coenzyme Q10 (COQ10 PO), Take 1 tablet by mouth Daily., Disp: , Rfl:   •  Eliquis 5 MG tablet tablet, Take 1 tablet by mouth twice daily, Disp: 60 tablet, Rfl: 1  •  esomeprazole (nexIUM) 40 MG capsule, TAKE 1 CAPSULE BY MOUTH IN THE MORNING BEFORE BREAKFAST, Disp: 30 capsule, Rfl: 1  •  losartan (COZAAR) 100 MG tablet, Take 0.5 tablets by mouth every night at bedtime., Disp: , Rfl:   •  MELATONIN PO, Take 10 mg by mouth Daily., Disp: , Rfl:   •  Multiple Vitamins-Minerals (multivitamin, eye vitamin,) tablet tablet, Take 1 tablet by mouth Daily., Disp: , Rfl:   •  multivitamin with minerals tablet tablet, Take 1 tablet by mouth Daily., Disp: , Rfl:   •  pramipexole (MIRAPEX) 0.125 MG tablet, Take 0.125 mg by mouth Every Night., Disp: , Rfl:   •  rosuvastatin (CRESTOR) 5 MG tablet, TAKE 1 TABLET EVERY NIGHT, Disp: 90 tablet, Rfl: 3   Past Medical History:   Diagnosis Date   • Dyslipidemia    • Hypertension       Past Surgical History:   Procedure Laterality Date   • BREAST BIOPSY Right     2015   • CARDIAC ELECTROPHYSIOLOGY PROCEDURE N/A 12/17/2020    Procedure: Loop insertion;  Surgeon: Nicanor Vega III, MD;  Location: [facility] INVASIVE LOCATION;  Service: Cardiovascular;  Laterality: N/A;      Family History   Problem Relation Age of Onset   • Breast cancer Neg Hx    • Ovarian cancer Neg Hx       Social History     Socioeconomic History   • Marital status:    Tobacco Use   • Smoking status: Never Smoker   •  "Smokeless tobacco: Never Used   Vaping Use   • Vaping Use: Never used   Substance and Sexual Activity   • Alcohol use: Not Currently   • Drug use: Never   • Sexual activity: Defer     Review of Systems   All other systems reviewed and are negative.      Objective:    /78   Pulse 68   Ht 167.6 cm (66\")   Wt 73 kg (161 lb)   SpO2 93%   BMI 25.99 kg/m²     Neurology Exam:    General apperance: NAD.     Mental status: Alert, awake and oriented to time place and person.  Could tell me the month, year, her date of birth and her ZIP Code    Recent and Remote memory: Intact.    Attention span and Concentration: Normal.     Language and Speech: Intact- No dysarthria.    Fluency, Naming , Repitition and Comprehension:  Intact    Cranial Nerves:   CN II: Visual fields are full. Intact. Fundi - Normal, No papillederma, Pupils - CRISTAL  CN III, IV and VI: Extraocular movements are intact. Normal saccades.   CN V: Facial sensation is intact.   CN VII: Muscles of facial expression reveal no asymmetry. Intact.   CN VIII: Hearing is intact. Whispered voice intact.   CN IX and X: Palate elevates symmetrically. Intact  CN XI: Shoulder shrug is intact.   CN XII: Tongue is midline without evidence of atrophy or fasciculation.     Ophthalmoscopic exam of optic disc- deferred    Motor:  Right UE muscle strength 5/5. Normal tone.     Left UE muscle strength 5/5. Normal tone.      Right LE muscle strength5/5. Normal tone.     Left LE muscle strength 5/5. Normal tone.      Sensory: Normal light touch, vibration and pinprick sensation bilaterally.    DTRs: 2+ bilaterally in upper and lower extremities.    Babinski: Negative bilaterally.    Co-ordination: Normal finger-to-nose, heel to shin B/L.    Rhomberg: Negative.    Gait: Mildly antalgic gait due to left hip pain    Cardiovascular: Regular rate and rhythm without murmur, gallop or rub.    Assessment and Plan:  1. Cerebrovascular accident (CVA), unspecified mechanism " (CMS/[facility])  Patient had acute infarcts in the left PCA territory along with left P1/P2 occlusion and distal right vertebral occlusion.  Holter monitor showed PACs and PVCs but no A. fib.  Her loop recorder showed paroxysmal atrial fibrillation.  Last loop recorder showed bradycardia.  She has an upcoming appointment with cardiology to see if she would be a candidate for pacemaker  She is now on Plavix 75 mg in addition to Eliquis 5 mg twice a day  She can continue Crestor 5 mg daily as she had myalgias with the higher dose.  Goal LDL of less than 100 her LDL was 105.  I will recheck a lipid panel, A1c and CK  Goal blood pressure less than 130/90.  I have told her to monitor her blood pressure regularly  I have also counseled her to carry out dietary modifications for her prediabetes.  A1c of 5.7    2.  Hip pain  Secondary to fall.  X-rays were negative for fracture  I have told her to stop ibuprofen and speak to her PCP about prescribing Celebrex which has fewer side effects  She can also use warm compresses over that area         Return in about 8 months (around 3/22/2023).     I spent 25 minutes with the patient out of the 20 minutes was spent face-to-face.  Berna Araiza MD

## 2022-07-23 LAB
CHOLEST SERPL-MCNC: 171 MG/DL (ref 0–200)
CK SERPL-CCNC: 227 U/L (ref 20–180)
ERYTHROCYTE [DISTWIDTH] IN BLOOD BY AUTOMATED COUNT: 12.9 % (ref 12.3–15.4)
HBA1C MFR BLD: 5.4 % (ref 4.8–5.6)
HCT VFR BLD AUTO: 37.5 % (ref 34–46.6)
HDLC SERPL-MCNC: 48 MG/DL (ref 40–60)
HGB BLD-MCNC: 12.8 G/DL (ref 12–15.9)
LDLC SERPL CALC-MCNC: 99 MG/DL (ref 0–100)
MCH RBC QN AUTO: 31.4 PG (ref 26.6–33)
MCHC RBC AUTO-ENTMCNC: 34.1 G/DL (ref 31.5–35.7)
MCV RBC AUTO: 92.1 FL (ref 79–97)
PLATELET # BLD AUTO: 270 10*3/MM3 (ref 140–450)
RBC # BLD AUTO: 4.07 10*6/MM3 (ref 3.77–5.28)
TRIGL SERPL-MCNC: 135 MG/DL (ref 0–150)
VLDLC SERPL CALC-MCNC: 24 MG/DL (ref 5–40)
WBC # BLD AUTO: 9.37 10*3/MM3 (ref 3.4–10.8)

## 2022-08-08 RX ORDER — APIXABAN 5 MG/1
TABLET, FILM COATED ORAL
Qty: 60 TABLET | Refills: 0 | Status: SHIPPED | OUTPATIENT
Start: 2022-08-08 | End: 2022-08-24 | Stop reason: SDUPTHER

## 2022-08-19 NOTE — PROGRESS NOTES
Electrophysiology Clinic Consult     Yumi Crooks  1945  [unfilled]  [unfilled]    08/24/22    DATE OF ADMISSION: (Not on file)  [facility] CARDIOLOGY    Oquendo, Sofia HARRISON, APRN  [address] / [place] [address]  Referring Provider: Nicanor Vega III, MD     Chief Complaint   Patient presents with   • sick sinus syndrome     Problem List:  1. Sick Sinus Syndrome  a. Loop recorder interrogation 7/21/2022 demonstrating episodes of sinus bradycardia during waking hours, consistent with symptoms of fatigue.   2. Paroxysmal atrial fibrillation  a. CHADSVasc = 6  on Eliquis  b. Diagnosed on loop recorder interrogation April 2021  c. Previous CVA 2020   3. Hypertension  4. Hyperlipidemia  5. CVA 10/2020  a. MRI evidence of multiple small infarcts in the left splenium of the corpus callosum and medial left occipital lobe  b. EEG unremarkable  c. VIDAL 10/16/2020: EF 61 to 65%, no evidence of left atrial appendage thrombus, saline test negative for right to left atrial level shunt  d. Loop recorder implant 12/17/2020-later found to have diagnosis of atrial fibrillation April 2021  6. History of breast cancer status post right mastectomy on Arimidex  7. Anxiety  8. Depression    History of Present Illness:     Mrs. Crooks is a pleasant 77 year old WF with above noted past medical history. She presents today in consultation with Dr. Chavez for further evaluation and management of her sinus bradycardia with consideration for PM implantation. She is referred today by Dr. Vega. Patient has a hx of a cryptogenic stroke in 2020. VIDAL showed EF of 61-65% with no evidence of DEANGELO thrombus and saline tests were negative. She had a loop recorder implanted in December 2020, which she was found to have AF in April 2021 and is now on eliquis for as well as coreg. On recent remote interrogation of her loop recorder in July, she was found to have episodes of bradycardia in the 40s and intermittent sinus  "pauses up to 3 seconds during sleep. She feels mildly fatigued, sob, and lh/dizzy. She denies any CP. Hrs at home range 50s-105 bpm. BP stable. Denies any recent hospitalizations or ER visits. Denies bleeding issues on Eliquis or new TIA or CVA symptoms.     Alcohol- none   Caffeine- 1/2 cut coffee (2 a day) / 1-2 coke zeros a day   Tobacco- none  Stimulants- none   Thyroid - parathyroid removed in the past   MARIVEL - never tested for      Allergies   Allergen Reactions   • Lortab [Hydrocodone-Acetaminophen] Other (See Comments)     \"hyperventilate\"   • Lipitor [Atorvastatin] Myalgia   • Penicillins Other (See Comments)     Had too much as a kid   • Sulfa Antibiotics Other (See Comments)      \"too much as a kid...and, it ruined my teeth\"        Cannot display prior to admission medications because the patient has not been admitted in this contact.            Current Outpatient Medications:   •  amLODIPine (NORVASC) 10 MG tablet, Take 1 tablet by mouth Every Morning., Disp: , Rfl:   •  anastrozole (ARIMIDEX) 1 MG tablet, Take 1 mg by mouth Daily., Disp: , Rfl:   •  busPIRone (BUSPAR) 5 MG tablet, Take 5 mg by mouth 2 (two) times a day., Disp: , Rfl:   •  carvedilol (COREG) 3.125 MG tablet, Take 3.125 mg by mouth 2 (Two) Times a Day With Meals., Disp: , Rfl:   •  clopidogrel (PLAVIX) 75 MG tablet, Take 1 tablet by mouth once daily, Disp: 90 tablet, Rfl: 0  •  Coenzyme Q10 (COQ10 PO), Take 1 tablet by mouth Daily., Disp: , Rfl:   •  Eliquis 5 MG tablet tablet, Take 1 tablet by mouth twice daily, Disp: 60 tablet, Rfl: 0  •  esomeprazole (nexIUM) 40 MG capsule, TAKE 1 CAPSULE BY MOUTH IN THE MORNING BEFORE BREAKFAST, Disp: 30 capsule, Rfl: 1  •  ibuprofen (ADVIL,MOTRIN) 200 MG tablet, Take 200 mg by mouth 2 (Two) Times a Day., Disp: , Rfl:   •  losartan (COZAAR) 100 MG tablet, Take 0.5 tablets by mouth every night at bedtime. (Patient taking differently: Take 100 mg by mouth every night at bedtime.), Disp: , Rfl:   •  " Magnesium 250 MG tablet, Take  by mouth Daily., Disp: , Rfl:   •  MELATONIN PO, Take 20 mg by mouth Every Night., Disp: , Rfl:   •  Multiple Vitamins-Minerals (multivitamin, eye vitamin,) tablet tablet, Take 1 tablet by mouth Daily., Disp: , Rfl:   •  multivitamin with minerals tablet tablet, Take 1 tablet by mouth Daily., Disp: , Rfl:   •  pramipexole (MIRAPEX) 0.125 MG tablet, Take 0.25 mg by mouth Every Night., Disp: , Rfl:   •  rosuvastatin (CRESTOR) 5 MG tablet, TAKE 1 TABLET EVERY NIGHT, Disp: 90 tablet, Rfl: 3    Social History     Socioeconomic History   • Marital status:    Tobacco Use   • Smoking status: Never Smoker   • Smokeless tobacco: Never Used   Vaping Use   • Vaping Use: Never used   Substance and Sexual Activity   • Alcohol use: Not Currently   • Drug use: Never   • Sexual activity: Defer       Family History   Problem Relation Age of Onset   • Breast cancer Neg Hx    • Ovarian cancer Neg Hx        REVIEW OF SYSTEMS:   CONST:  No weight loss, fever, chills, +weakness +fatigue.   HEENT:  No visual loss, blurred vision, double vision, yellow sclerae.                   No hearing loss, congestion, sore throat.   SKIN:      No rashes, urticaria, ulcers, sores.     RESP:     +shortness of breath, no hemoptysis, cough, sputum.   GI:           No anorexia, nausea, vomiting, diarrhea. No abdominal pain, melena.   :         No burning on urination, hematuria or increased frequency.  ENDO:    No diaphoresis, cold or heat intolerance. No polyuria or polydipsia.   NEURO:  No headache,+LH/ dizziness, no syncope, paralysis, ataxia, or parasthesias.                  No change in bowel or bladder control. No history of CVA/TIA  MUSC:    No muscle, back pain, joint pain or stiffness.   HEME:    No anemia, bleeding, bruising. No history of DVT/PE.  PSYCH:  No history of depression, anxiety    Vitals:    08/24/22 1502   BP: 160/70   BP Location: Left arm   Patient Position: Sitting   Cuff Size: Adult  "  Pulse: 55   SpO2: 99%   Weight: 73 kg (161 lb)   Height: 167.6 cm (66\")                 Physical Exam:  GEN: Well nourished, well-developed, no acute distress  HEENT: Normocephalic, atraumatic, PERRLA, moist mucous membranes  NECK: Supple, NO JVD, no thyromegaly, no lymphadenopathy  CARD: S1S2, regular rhythm, bradycardic, no murmur, gallop, rub  LUNGS: Clear to auscultation, normal respiratory effort  ABDOMEN: Soft, nontender, normal bowel sounds  EXTREMITIES: No gross deformities, no clubbing, cyanosis, or edema  SKIN: Warm, dry  NEURO: No focal deficits, alert and oriented x 3  PSYCHIATRIC: Normal affect and mood      I personally viewed and interpreted the patient's EKG/Telemetry/lab data    Lab Results   Component Value Date    GLUCOSE 99 12/17/2020    CALCIUM 9.6 12/17/2020     12/17/2020    K 4.0 12/17/2020    CO2 21.0 (L) 12/17/2020     12/17/2020    BUN 23 12/17/2020    CREATININE 0.89 12/17/2020    EGFRIFAFRI 57 (L) 11/13/2020    EGFRIFNONA 62 12/17/2020    BCR 25.8 (H) 12/17/2020    ANIONGAP 12.0 12/17/2020     Lab Results   Component Value Date    WBC 9.37 07/22/2022    HGB 12.8 07/22/2022    HCT 37.5 07/22/2022    MCV 92.1 07/22/2022     07/22/2022     Lab Results   Component Value Date    INR 1.09 10/15/2020    PROTIME 13.9 10/15/2020     Lab Results   Component Value Date    TSH 1.210 11/13/2020             ECG 12 Lead    Date/Time: 8/24/2022 3:06 PM  Performed by: Chuck Chavez MD  Authorized by: Chuck Chavez MD   Comparison: compared with previous ECG from 11/9/2020  Rhythm: sinus bradycardia  Rate: bradycardic  BPM: 55  Comments: With PACs              ICD-10-CM ICD-9-CM   1. SSS (sick sinus syndrome) ([facility])  I49.5 427.81   2. Paroxysmal atrial fibrillation (H[facility])  I48.0 427.31   3. Hypertension, unspecified type  I10 401.9       Assessment and Plan:     1. Symptomatic SSS  - Documented bradycardia during waking hours on Loop recorder interrogation 7/21/2022 " demonstrating episodes of sinus bradycardia, consistent with symptoms of mild fatigue, sob, LH/dizziness. Patient would benefit from DC ppm implant. The risks, benefits, and alternatives of the procedure have been reviewed and the patient wishes to proceed. Patient will need to hold Eliquis 2 days prior to PM implantation.      2. PAF   -CHADSVasc = 6  on Eliquis (Hold x 2 days prior to PM implant)   -Diagnosed on loop recorder interrogation April 2021.   -Continue coreg.     3. HTN  -well controlled at home, continue present medications     4. HLD   -Continue statin     Scribed for Chuck Chavez MD by DIONY Moody. 8/24/2022 15:28 EDT       I, Chuck Chavez MD, personally performed the services described in this documentation as scribed by the above named individual in my presence, and it is both accurate and complete.  8/24/2022  15:46 EDT

## 2022-08-21 PROCEDURE — G2066 INTER DEVC REMOTE 30D: HCPCS | Performed by: INTERNAL MEDICINE

## 2022-08-21 PROCEDURE — 93298 REM INTERROG DEV EVAL SCRMS: CPT | Performed by: INTERNAL MEDICINE

## 2022-08-24 ENCOUNTER — OFFICE VISIT (OUTPATIENT)
Dept: CARDIOLOGY | Facility: CLINIC | Age: 77
End: 2022-08-24

## 2022-08-24 VITALS
OXYGEN SATURATION: 99 % | BODY MASS INDEX: 25.88 KG/M2 | HEIGHT: 66 IN | DIASTOLIC BLOOD PRESSURE: 70 MMHG | WEIGHT: 161 LBS | SYSTOLIC BLOOD PRESSURE: 160 MMHG | HEART RATE: 55 BPM

## 2022-08-24 DIAGNOSIS — I48.0 PAROXYSMAL ATRIAL FIBRILLATION: ICD-10-CM

## 2022-08-24 DIAGNOSIS — I49.5 SSS (SICK SINUS SYNDROME): Primary | ICD-10-CM

## 2022-08-24 DIAGNOSIS — I10 HYPERTENSION, UNSPECIFIED TYPE: ICD-10-CM

## 2022-08-24 PROCEDURE — 99214 OFFICE O/P EST MOD 30 MIN: CPT | Performed by: INTERNAL MEDICINE

## 2022-08-24 PROCEDURE — 93000 ELECTROCARDIOGRAM COMPLETE: CPT | Performed by: INTERNAL MEDICINE

## 2022-08-24 RX ORDER — MULTIVITAMIN WITH IRON
TABLET ORAL DAILY
COMMUNITY

## 2022-08-24 RX ORDER — CARVEDILOL 3.12 MG/1
3.12 TABLET ORAL 2 TIMES DAILY WITH MEALS
COMMUNITY
End: 2022-12-20 | Stop reason: SDUPTHER

## 2022-08-24 RX ORDER — IBUPROFEN 200 MG
200 TABLET ORAL 2 TIMES DAILY
Status: ON HOLD | COMMUNITY
End: 2022-09-27

## 2022-08-24 RX ORDER — CLOPIDOGREL BISULFATE 75 MG/1
75 TABLET ORAL DAILY
Qty: 90 TABLET | Refills: 0 | Status: SHIPPED | OUTPATIENT
Start: 2022-08-24 | End: 2023-01-09

## 2022-09-07 ENCOUNTER — PREP FOR SURGERY (OUTPATIENT)
Dept: OTHER | Facility: HOSPITAL | Age: 77
End: 2022-09-07

## 2022-09-07 DIAGNOSIS — I49.5 SSS (SICK SINUS SYNDROME): Primary | ICD-10-CM

## 2022-09-07 RX ORDER — SODIUM CHLORIDE 0.9 % (FLUSH) 0.9 %
3 SYRINGE (ML) INJECTION EVERY 12 HOURS SCHEDULED
Status: CANCELLED | OUTPATIENT
Start: 2022-09-07

## 2022-09-07 RX ORDER — CEFAZOLIN SODIUM 2 G/100ML
2 INJECTION, SOLUTION INTRAVENOUS ONCE
Status: CANCELLED | OUTPATIENT
Start: 2022-09-07 | End: 2022-09-07

## 2022-09-07 RX ORDER — ACETAMINOPHEN 325 MG/1
650 TABLET ORAL EVERY 4 HOURS PRN
Status: CANCELLED | OUTPATIENT
Start: 2022-09-07

## 2022-09-07 RX ORDER — NITROGLYCERIN 0.4 MG/1
0.4 TABLET SUBLINGUAL
Status: CANCELLED | OUTPATIENT
Start: 2022-09-07

## 2022-09-07 RX ORDER — SODIUM CHLORIDE 9 MG/ML
1 INJECTION, SOLUTION INTRAVENOUS CONTINUOUS
Status: CANCELLED | OUTPATIENT
Start: 2022-09-07 | End: 2022-09-07

## 2022-09-07 RX ORDER — SODIUM CHLORIDE 0.9 % (FLUSH) 0.9 %
10 SYRINGE (ML) INJECTION AS NEEDED
Status: CANCELLED | OUTPATIENT
Start: 2022-09-07

## 2022-09-21 PROCEDURE — G2066 INTER DEVC REMOTE 30D: HCPCS | Performed by: INTERNAL MEDICINE

## 2022-09-21 PROCEDURE — 93298 REM INTERROG DEV EVAL SCRMS: CPT | Performed by: INTERNAL MEDICINE

## 2022-09-27 ENCOUNTER — APPOINTMENT (OUTPATIENT)
Dept: GENERAL RADIOLOGY | Facility: HOSPITAL | Age: 77
End: 2022-09-27

## 2022-09-27 ENCOUNTER — HOSPITAL ENCOUNTER (OUTPATIENT)
Facility: HOSPITAL | Age: 77
Discharge: HOME | End: 2022-09-27
Attending: INTERNAL MEDICINE | Admitting: INTERNAL MEDICINE

## 2022-09-27 VITALS
BODY MASS INDEX: 25.65 KG/M2 | TEMPERATURE: 98.3 F | HEART RATE: 60 BPM | SYSTOLIC BLOOD PRESSURE: 150 MMHG | RESPIRATION RATE: 19 BRPM | HEIGHT: 66 IN | OXYGEN SATURATION: 97 % | DIASTOLIC BLOOD PRESSURE: 85 MMHG | WEIGHT: 159.61 LBS

## 2022-09-27 DIAGNOSIS — I49.5 SSS (SICK SINUS SYNDROME): ICD-10-CM

## 2022-09-27 LAB
ANION GAP SERPL CALCULATED.3IONS-SCNC: 12 MMOL/L (ref 5–15)
BUN SERPL-MCNC: 19 MG/DL (ref 8–23)
BUN/CREAT SERPL: 23.8 (ref 7–25)
CALCIUM SPEC-SCNC: 9.6 MG/DL (ref 8.6–10.5)
CHLORIDE SERPL-SCNC: 107 MMOL/L (ref 98–107)
CO2 SERPL-SCNC: 22 MMOL/L (ref 22–29)
CREAT SERPL-MCNC: 0.8 MG/DL (ref 0.57–1)
DEPRECATED RDW RBC AUTO: 43.7 FL (ref 37–54)
EGFRCR SERPLBLD CKD-EPI 2021: 76 ML/MIN/1.73
ERYTHROCYTE [DISTWIDTH] IN BLOOD BY AUTOMATED COUNT: 12.8 % (ref 12.3–15.4)
GLUCOSE SERPL-MCNC: 96 MG/DL (ref 65–99)
HBA1C MFR BLD: 5.7 % (ref 4.8–5.6)
HCT VFR BLD AUTO: 37.1 % (ref 34–46.6)
HGB BLD-MCNC: 12.6 G/DL (ref 12–15.9)
MCH RBC QN AUTO: 31.7 PG (ref 26.6–33)
MCHC RBC AUTO-ENTMCNC: 34 G/DL (ref 31.5–35.7)
MCV RBC AUTO: 93.2 FL (ref 79–97)
PLATELET # BLD AUTO: 213 10*3/MM3 (ref 140–450)
PMV BLD AUTO: 10.5 FL (ref 6–12)
POTASSIUM SERPL-SCNC: 4 MMOL/L (ref 3.5–5.2)
RBC # BLD AUTO: 3.98 10*6/MM3 (ref 3.77–5.28)
SODIUM SERPL-SCNC: 141 MMOL/L (ref 136–145)
WBC NRBC COR # BLD: 7.06 10*3/MM3 (ref 3.4–10.8)

## 2022-09-27 PROCEDURE — 25010000002 FENTANYL CITRATE (PF) 50 MCG/ML SOLUTION: Performed by: INTERNAL MEDICINE

## 2022-09-27 PROCEDURE — 25010000002 MIDAZOLAM PER 1 MG: Performed by: INTERNAL MEDICINE

## 2022-09-27 PROCEDURE — 99153 MOD SED SAME PHYS/QHP EA: CPT | Performed by: INTERNAL MEDICINE

## 2022-09-27 PROCEDURE — 85027 COMPLETE CBC AUTOMATED: CPT | Performed by: INTERNAL MEDICINE

## 2022-09-27 PROCEDURE — C1898 LEAD, PMKR, OTHER THAN TRANS: HCPCS | Performed by: INTERNAL MEDICINE

## 2022-09-27 PROCEDURE — 99152 MOD SED SAME PHYS/QHP 5/>YRS: CPT | Performed by: INTERNAL MEDICINE

## 2022-09-27 PROCEDURE — 33208 INSRT HEART PM ATRIAL & VENT: CPT | Performed by: INTERNAL MEDICINE

## 2022-09-27 PROCEDURE — 71046 X-RAY EXAM CHEST 2 VIEWS: CPT

## 2022-09-27 PROCEDURE — 83036 HEMOGLOBIN GLYCOSYLATED A1C: CPT | Performed by: NURSE PRACTITIONER

## 2022-09-27 PROCEDURE — 25010000002 KETOROLAC TROMETHAMINE PER 15 MG: Performed by: INTERNAL MEDICINE

## 2022-09-27 PROCEDURE — 0 IOPAMIDOL PER 1 ML: Performed by: INTERNAL MEDICINE

## 2022-09-27 PROCEDURE — C1785 PMKR, DUAL, RATE-RESP: HCPCS | Performed by: INTERNAL MEDICINE

## 2022-09-27 PROCEDURE — 80048 BASIC METABOLIC PNL TOTAL CA: CPT | Performed by: INTERNAL MEDICINE

## 2022-09-27 PROCEDURE — 0 LIDOCAINE 1 % SOLUTION: Performed by: INTERNAL MEDICINE

## 2022-09-27 PROCEDURE — 25010000002 CEFAZOLIN IN DEXTROSE 2-4 GM/100ML-% SOLUTION: Performed by: NURSE PRACTITIONER

## 2022-09-27 PROCEDURE — 36415 COLL VENOUS BLD VENIPUNCTURE: CPT

## 2022-09-27 PROCEDURE — 33286 RMVL SUBQ CAR RHYTHM MNTR: CPT | Performed by: INTERNAL MEDICINE

## 2022-09-27 PROCEDURE — C1892 INTRO/SHEATH,FIXED,PEEL-AWAY: HCPCS | Performed by: INTERNAL MEDICINE

## 2022-09-27 PROCEDURE — 25010000002 ONDANSETRON PER 1 MG: Performed by: INTERNAL MEDICINE

## 2022-09-27 DEVICE — PACE/SENSE LEAD
Type: IMPLANTABLE DEVICE | Site: HEART | Status: FUNCTIONAL
Brand: INGEVITY™+

## 2022-09-27 DEVICE — PACEMAKER
Type: IMPLANTABLE DEVICE | Site: HEART | Status: FUNCTIONAL
Brand: ACCOLADE™ MRI DR

## 2022-09-27 RX ORDER — SODIUM CHLORIDE 9 MG/ML
1 INJECTION, SOLUTION INTRAVENOUS CONTINUOUS
Status: ACTIVE | OUTPATIENT
Start: 2022-09-27 | End: 2022-09-27

## 2022-09-27 RX ORDER — LOSARTAN POTASSIUM 50 MG/1
100 TABLET ORAL DAILY
Status: DISCONTINUED | OUTPATIENT
Start: 2022-09-27 | End: 2022-09-27 | Stop reason: HOSPADM

## 2022-09-27 RX ORDER — MIDAZOLAM HYDROCHLORIDE 1 MG/ML
INJECTION INTRAMUSCULAR; INTRAVENOUS AS NEEDED
Status: DISCONTINUED | OUTPATIENT
Start: 2022-09-27 | End: 2022-09-27 | Stop reason: HOSPADM

## 2022-09-27 RX ORDER — SODIUM CHLORIDE 0.9 % (FLUSH) 0.9 %
3 SYRINGE (ML) INJECTION EVERY 12 HOURS SCHEDULED
Status: DISCONTINUED | OUTPATIENT
Start: 2022-09-27 | End: 2022-09-27 | Stop reason: HOSPADM

## 2022-09-27 RX ORDER — ROSUVASTATIN CALCIUM 10 MG/1
5 TABLET, COATED ORAL NIGHTLY
Status: DISCONTINUED | OUTPATIENT
Start: 2022-09-27 | End: 2022-09-27 | Stop reason: HOSPADM

## 2022-09-27 RX ORDER — LIDOCAINE HYDROCHLORIDE 10 MG/ML
INJECTION, SOLUTION INFILTRATION; PERINEURAL AS NEEDED
Status: DISCONTINUED | OUTPATIENT
Start: 2022-09-27 | End: 2022-09-27 | Stop reason: HOSPADM

## 2022-09-27 RX ORDER — AMLODIPINE BESYLATE 5 MG/1
10 TABLET ORAL DAILY
Status: DISCONTINUED | OUTPATIENT
Start: 2022-09-28 | End: 2022-09-27 | Stop reason: HOSPADM

## 2022-09-27 RX ORDER — ACETAMINOPHEN 325 MG/1
650 TABLET ORAL EVERY 4 HOURS PRN
Status: DISCONTINUED | OUTPATIENT
Start: 2022-09-27 | End: 2022-09-27 | Stop reason: HOSPADM

## 2022-09-27 RX ORDER — PRAMIPEXOLE DIHYDROCHLORIDE 0.25 MG/1
0.25 TABLET ORAL NIGHTLY
Status: DISCONTINUED | OUTPATIENT
Start: 2022-09-27 | End: 2022-09-27 | Stop reason: HOSPADM

## 2022-09-27 RX ORDER — KETOROLAC TROMETHAMINE 15 MG/ML
15 INJECTION, SOLUTION INTRAMUSCULAR; INTRAVENOUS ONCE
Status: COMPLETED | OUTPATIENT
Start: 2022-09-27 | End: 2022-09-27

## 2022-09-27 RX ORDER — NITROGLYCERIN 0.4 MG/1
0.4 TABLET SUBLINGUAL
Status: DISCONTINUED | OUTPATIENT
Start: 2022-09-27 | End: 2022-09-27 | Stop reason: HOSPADM

## 2022-09-27 RX ORDER — ONDANSETRON 2 MG/ML
4 INJECTION INTRAMUSCULAR; INTRAVENOUS EVERY 6 HOURS PRN
Status: DISCONTINUED | OUTPATIENT
Start: 2022-09-27 | End: 2022-09-27 | Stop reason: HOSPADM

## 2022-09-27 RX ORDER — SODIUM CHLORIDE 9 MG/ML
INJECTION, SOLUTION INTRAVENOUS CONTINUOUS PRN
Status: DISCONTINUED | OUTPATIENT
Start: 2022-09-27 | End: 2022-09-27 | Stop reason: HOSPADM

## 2022-09-27 RX ORDER — ANASTROZOLE 1 MG/1
1 TABLET ORAL DAILY
Status: DISCONTINUED | OUTPATIENT
Start: 2022-09-27 | End: 2022-09-27 | Stop reason: HOSPADM

## 2022-09-27 RX ORDER — SODIUM CHLORIDE 0.9 % (FLUSH) 0.9 %
10 SYRINGE (ML) INJECTION AS NEEDED
Status: DISCONTINUED | OUTPATIENT
Start: 2022-09-27 | End: 2022-09-27 | Stop reason: HOSPADM

## 2022-09-27 RX ORDER — FENTANYL CITRATE 50 UG/ML
INJECTION, SOLUTION INTRAMUSCULAR; INTRAVENOUS AS NEEDED
Status: DISCONTINUED | OUTPATIENT
Start: 2022-09-27 | End: 2022-09-27 | Stop reason: HOSPADM

## 2022-09-27 RX ORDER — CEFAZOLIN SODIUM 2 G/100ML
2 INJECTION, SOLUTION INTRAVENOUS ONCE
Status: COMPLETED | OUTPATIENT
Start: 2022-09-27 | End: 2022-09-27

## 2022-09-27 RX ORDER — LOSARTAN POTASSIUM 100 MG/1
100 TABLET ORAL DAILY
COMMUNITY

## 2022-09-27 RX ORDER — BUSPIRONE HYDROCHLORIDE 5 MG/1
5 TABLET ORAL EVERY 12 HOURS SCHEDULED
Status: DISCONTINUED | OUTPATIENT
Start: 2022-09-27 | End: 2022-09-27 | Stop reason: HOSPADM

## 2022-09-27 RX ORDER — ONDANSETRON 2 MG/ML
INJECTION INTRAMUSCULAR; INTRAVENOUS AS NEEDED
Status: DISCONTINUED | OUTPATIENT
Start: 2022-09-27 | End: 2022-09-27 | Stop reason: HOSPADM

## 2022-09-27 RX ADMIN — CEFAZOLIN SODIUM 2 G: 2 INJECTION, SOLUTION INTRAVENOUS at 15:05

## 2022-09-27 RX ADMIN — KETOROLAC TROMETHAMINE 15 MG: 15 INJECTION, SOLUTION INTRAMUSCULAR; INTRAVENOUS at 16:45

## 2022-09-28 ENCOUNTER — CALL CENTER PROGRAMS (OUTPATIENT)
Dept: CALL CENTER | Facility: HOSPITAL | Age: 77
End: 2022-09-28

## 2022-09-28 NOTE — OUTREACH NOTE
PCI/Device Survey    Flowsheet Row Responses   Facility patient discharged from? [facility]   Procedure date 09/27/22   Procedure (if device, specify in description) Device   Device Description Pacemaker DC new and loop removal   Performing MD Dr. Chuck Chavez   Attempt successful? Yes   Call start time 1046   Call end time 1055   Person spoke with today (if not patient) and relationship patient   Has the patient had any of the following symptoms since discharge? --  [Denies any symptoms. Reports feeling good this am. Reports checked HR and it was 67]   Is the patient taking prescribed medications: Plavix  [Patient to resume eliquis on Thursday]   Nursing intervention Reminded to continue to take prescribed medications   Does the patient have any of the following symptoms related to the cath/surgical site? --  [Dressing to chest remains intact this am. Patient aware to leave dressing in place and to keep dry. ]   Nursing intervention Patient education provided   Does the patient have an appointment scheduled with the cardiologist? Yes   Appointment comments Wound Check Oct 4, 2022 1:30 PM, Follow Up with YOSHI Rubio Tuesday Dec 20, 2022 9:15 AM   If the patient is a current smoker, are they able to teach back resources for cessation? Not a smoker   Did the patient feel prepared to go home on the same day as the procedure? Yes   Is the patient satisfied with the same day discharge process? Yes   PCI/Device call completed Yes   Wrap up additional comments No further questions today. Patient reports that she has all of her discharge instructions.           CRUZ VILLATORO - Registered Nurse

## 2022-10-04 ENCOUNTER — OFFICE VISIT (OUTPATIENT)
Dept: CARDIOLOGY | Facility: CLINIC | Age: 77
End: 2022-10-04

## 2022-10-04 DIAGNOSIS — I49.5 SSS (SICK SINUS SYNDROME): Primary | ICD-10-CM

## 2022-10-04 NOTE — PROGRESS NOTES
10/04/2022    Yumi Antoniomichelle rCooks, : 1945    WOUND CHECK    B/P:     Pulse:     Patient has fever: [] Temperature if indicated:     Wound Location: Left Infraclavicular     Dressing Removed [x]        Old Dressing Appearance:  Clean, dry [x]                 Old, bloody drainage []                            Moist, serous drainage []                Moist, thick yellow/green drainage []       Wound Appearance: Redness []                  Drainage []                  Culture obtained []        Color: Clear     Consistency: n/a     Amount: none         Gloves used, wound cleansed with sterile 4x4 and peroxide [x]       MD notified [] MD orders:     Antibiotic started []  If checked, type   Other:     Appointment for follow-up scheduled for 3 months post procedure [x]    Future Appointments   Date Time Provider Department Center   2022  9:15 AM Fermin Jara PA [facility]   3/24/2023  1:30 PM Berna Araiza MD [facility]   2023  4:00 PM Chuck Chavez MD [facility]           Cadance Collins, MA, 10/04/22      MD Signature:______________________________ Completed By/Date:

## 2022-11-02 RX ORDER — ROSUVASTATIN CALCIUM 5 MG/1
TABLET, COATED ORAL
Qty: 90 TABLET | Refills: 3 | Status: SHIPPED | OUTPATIENT
Start: 2022-11-02

## 2022-11-02 NOTE — TELEPHONE ENCOUNTER
Rx Refill Note  Requested Prescriptions     Pending Prescriptions Disp Refills   • rosuvastatin (CRESTOR) 5 MG tablet [Pharmacy Med Name: ROSUVASTATIN CALCIUM 5 MG Tablet] 90 tablet 3     Sig: TAKE 1 TABLET EVERY NIGHT      Last filled: 11/22/21 90 day with 3 refills sent this in.   Last office visit with prescribing clinician: 7/22/2022      Next office visit with prescribing clinician: 3/24/2023     Nedra Craft MA  11/02/22, 13:55 EDT

## 2022-12-19 NOTE — PROGRESS NOTES
"Electrophysiology Clinic Consult     Yumi Crooks  1945  12/20/22    DATE OF ADMISSION: (Not on file)  [facility] CARDIOLOGY MAIN CAMPUS    Sofia Oquendo, APRN  [address] [facility] [address]  Referring Provider: No ref. provider found     Chief Complaint   Patient presents with   • SSS (sick sinus syndrome) ([facility])     Problem List:  1. Sick Sinus Syndrome  a. Loop recorder interrogation 7/21/2022 demonstrating episodes of sinus bradycardia during waking hours, consistent with symptoms of fatigue.   b. Placement of [employer] pacemaker dual chamber.  2. Paroxysmal atrial fibrillation  a. CHADSVasc = 6  on Eliquis  b. Diagnosed on loop recorder interrogation April 2021  c. Previous CVA 2020   3. Hypertension  4. Hyperlipidemia  5. CVA 10/2020  a. MRI evidence of multiple small infarcts in the left splenium of the corpus callosum and medial left occipital lobe  b. EEG unremarkable  c. VIDAL 10/16/2020: EF 61 to 65%, no evidence of left atrial appendage thrombus, saline test negative for right to left atrial level shunt  d. Loop recorder implant 12/17/2020-later found to have diagnosis of atrial fibrillation April 2021  6. History of breast cancer status post right mastectomy on Arimidex  7. Anxiety  8. Depression    History of Present Illness:     Mrs. Crooks is a pleasant 77 year old WF with above noted past medical history.  She returns today for follow-up status post pacemaker placement for sick sinus syndrome and hypertension.  She states since the pacemaker placed she has some increase shortness with exertion otherwise has not noted any atrial fibrillation dizziness near syncope or syncope.  He has no heart failure symptoms.      Allergies   Allergen Reactions   • Lortab [Hydrocodone-Acetaminophen] Other (See Comments)     \"hyperventilate\"   • Lipitor [Atorvastatin] Myalgia   • Penicillins Other (See Comments)     Had too much as a kid   • Sulfa Antibiotics Other (See " "Comments)      \"too much as a kid...and, it ruined my teeth\"        Cannot display prior to admission medications because the patient has not been admitted in this contact.            Current Outpatient Medications:   •  amLODIPine (NORVASC) 10 MG tablet, Take 1 tablet by mouth Every Morning., Disp: , Rfl:   •  anastrozole (ARIMIDEX) 1 MG tablet, Take 1 mg by mouth Daily., Disp: , Rfl:   •  apixaban (Eliquis) 5 MG tablet tablet, Take 1 tablet by mouth 2 (Two) Times a Day. First dose Thursday 9/29/2022, Disp: 60 tablet, Rfl: 2  •  busPIRone (BUSPAR) 5 MG tablet, Take 5 mg by mouth 2 (two) times a day., Disp: , Rfl:   •  carvedilol (COREG) 6.25 MG tablet, Take 0.5 tablets by mouth 2 (Two) Times a Day With Meals., Disp: 60 tablet, Rfl: 6  •  clopidogrel (PLAVIX) 75 MG tablet, Take 1 tablet by mouth Daily., Disp: 90 tablet, Rfl: 0  •  Coenzyme Q10 (COQ10 PO), Take 1 tablet by mouth Daily., Disp: , Rfl:   •  esomeprazole (nexIUM) 40 MG capsule, TAKE 1 CAPSULE BY MOUTH IN THE MORNING BEFORE BREAKFAST, Disp: 30 capsule, Rfl: 1  •  losartan (COZAAR) 100 MG tablet, Take 100 mg by mouth Daily., Disp: , Rfl:   •  Magnesium 250 MG tablet, Take  by mouth Daily., Disp: , Rfl:   •  Multiple Vitamins-Minerals (multivitamin, eye vitamin,) tablet tablet, Take 1 tablet by mouth Daily., Disp: , Rfl:   •  multivitamin with minerals tablet tablet, Take 1 tablet by mouth Daily., Disp: , Rfl:   •  pramipexole (MIRAPEX) 0.125 MG tablet, Take 0.25 mg by mouth Every Night., Disp: , Rfl:   •  rosuvastatin (CRESTOR) 5 MG tablet, TAKE 1 TABLET EVERY NIGHT, Disp: 90 tablet, Rfl: 3    Social History     Socioeconomic History   • Marital status:    Tobacco Use   • Smoking status: Never   • Smokeless tobacco: Never   Vaping Use   • Vaping Use: Never used   Substance and Sexual Activity   • Alcohol use: Never   • Drug use: Never   • Sexual activity: Not Currently     Partners: Male     Birth control/protection: Surgical       Family History " "  Problem Relation Age of Onset   • Breast cancer Neg Hx    • Ovarian cancer Neg Hx        Vitals:    12/20/22 0912   BP: 150/92   BP Location: Right arm   Patient Position: Sitting   Cuff Size: Adult   Pulse: 65   SpO2: 97%   Weight: 76.2 kg (168 lb)   Height: 167.6 cm (66\")                 Physical Exam:  GEN: Well nourished, well-developed, no acute distress  HEENT: Normocephalic, atraumatic, PERRLA, moist mucous membranes  NECK: Supple, NO JVD, no thyromegaly, no lymphadenopathy  CARD: S1S2, regular rhythm, bradycardic, no murmur, gallop, rub  LUNGS: Clear to auscultation, normal respiratory effort  ABDOMEN: Soft, nontender, normal bowel sounds  EXTREMITIES: No gross deformities, no clubbing, cyanosis, or edema  SKIN: Warm, dry  NEURO: No focal deficits, alert and oriented x 3  PSYCHIATRIC: Normal affect and mood      I personally viewed and interpreted the patient's EKG/Telemetry/lab data    Lab Results   Component Value Date    GLUCOSE 96 09/27/2022    CALCIUM 9.6 09/27/2022     09/27/2022    K 4.0 09/27/2022    CO2 22.0 09/27/2022     09/27/2022    BUN 19 09/27/2022    CREATININE 0.80 09/27/2022    EGFRIFAFRI 57 (L) 11/13/2020    EGFRIFNONA 62 12/17/2020    BCR 23.8 09/27/2022    ANIONGAP 12.0 09/27/2022     Lab Results   Component Value Date    WBC 7.06 09/27/2022    HGB 12.6 09/27/2022    HCT 37.1 09/27/2022    MCV 93.2 09/27/2022     09/27/2022     Lab Results   Component Value Date    INR 1.09 10/15/2020    PROTIME 13.9 10/15/2020     Lab Results   Component Value Date    TSH 1.210 11/13/2020             ECG 12 Lead    Date/Time: 12/20/2022 4:25 PM  Performed by: Fermin Jara PA  Authorized by: Fermin Jara PA   Comparison: compared with previous ECG from 8/24/2022  Similar to previous ECG  Rhythm: sinus rhythm  Rate: normal  Conduction: conduction normal  QRS axis: normal    Clinical impression: normal ECG          Manual device interrogation: [employer] pacemaker. "  DDDR at 60.  A paced 87%.  RV paced 1%.  Normal P wave R wave thresholds impedance.  Battery voltage 9.5 years remaining.  Underlying rhythm sinus bradycardia in 40s.  No A. fib or flutter.  No A. fib or flutter    ICD-10-CM ICD-9-CM   1. SSS (sick sinus syndrome) (HCC)  I49.5 427.81   2. Paroxysmal atrial fibrillation (HCC)  I48.0 427.31   3. History of CVA (cerebrovascular accident)  Z86.73 V12.54   4. Essential hypertension  I10 401.9       Assessment and Plan:     1. Symptomatic SSS: Normal pacemaker function today.  We will increase accelerometer or turn on to improve upon fatigue with exertion.  -     2. PAF   -CHADSVasc = 6  on Eliquis  -Diagnosed on loop recorder interrogation April 2021.   -Continue coreg.     3. HTN  -well controlled at home, continue present medications     4. HLD   -Continue statin     Return for follow-up in 6 to 8 months or sooner as needed  Electronically signed by YOSHI Rubio, 12/20/22, 4:27 PM EST.

## 2022-12-20 ENCOUNTER — OFFICE VISIT (OUTPATIENT)
Dept: CARDIOLOGY | Facility: CLINIC | Age: 77
End: 2022-12-20

## 2022-12-20 VITALS
HEIGHT: 66 IN | SYSTOLIC BLOOD PRESSURE: 150 MMHG | DIASTOLIC BLOOD PRESSURE: 92 MMHG | BODY MASS INDEX: 27 KG/M2 | OXYGEN SATURATION: 97 % | WEIGHT: 168 LBS | HEART RATE: 65 BPM

## 2022-12-20 DIAGNOSIS — I49.5 SSS (SICK SINUS SYNDROME): Primary | ICD-10-CM

## 2022-12-20 DIAGNOSIS — I10 ESSENTIAL HYPERTENSION: ICD-10-CM

## 2022-12-20 DIAGNOSIS — I48.0 PAROXYSMAL ATRIAL FIBRILLATION: ICD-10-CM

## 2022-12-20 DIAGNOSIS — Z86.73 HISTORY OF CVA (CEREBROVASCULAR ACCIDENT): ICD-10-CM

## 2022-12-20 PROCEDURE — 99213 OFFICE O/P EST LOW 20 MIN: CPT | Performed by: PHYSICIAN ASSISTANT

## 2022-12-20 PROCEDURE — 93280 PM DEVICE PROGR EVAL DUAL: CPT | Performed by: PHYSICIAN ASSISTANT

## 2022-12-20 PROCEDURE — 93000 ELECTROCARDIOGRAM COMPLETE: CPT | Performed by: PHYSICIAN ASSISTANT

## 2022-12-20 RX ORDER — CARVEDILOL 6.25 MG/1
3.12 TABLET ORAL 2 TIMES DAILY WITH MEALS
Qty: 60 TABLET | Refills: 6 | Status: SHIPPED | OUTPATIENT
Start: 2022-12-20 | End: 2023-02-17 | Stop reason: HOSPADM

## 2023-01-09 RX ORDER — CLOPIDOGREL BISULFATE 75 MG/1
TABLET ORAL
Qty: 90 TABLET | Refills: 1 | Status: SHIPPED | OUTPATIENT
Start: 2023-01-09 | End: 2023-03-02

## 2023-02-13 ENCOUNTER — TELEPHONE (OUTPATIENT)
Dept: CARDIOLOGY | Facility: CLINIC | Age: 78
End: 2023-02-13
Payer: MEDICARE

## 2023-02-13 DIAGNOSIS — I48.0 PAROXYSMAL ATRIAL FIBRILLATION: Primary | ICD-10-CM

## 2023-02-13 NOTE — TELEPHONE ENCOUNTER
Pt's daughter, Sofia Monsivais, called to report that pt has been ill the last week and sought treatment at a walk in clinic this past weekend. Pt was diagnosed with a sinus infection & treated w/azithromycin & cetirizine. Ms. Monsivais states pt fells weak, tired, now has diarrhea.     Pt also reports feeling fast heart rates. Assisted Ms. Monsivais with a manual Occasion remote device transmission.    Afib burden 4%since 12/20/2022  Atrial fib episodes starated 2/10/2023.  Ongoing in presenting EGM.  V-rates not well controlled below. Report in MUR[facility].     Pt reports that she cannot split her carvedilol tablet as previously instructed so she takes a full tablet at night; compliant w/cardiac Rx.

## 2023-02-15 ENCOUNTER — PREP FOR SURGERY (OUTPATIENT)
Dept: OTHER | Facility: HOSPITAL | Age: 78
End: 2023-02-15
Payer: MEDICARE

## 2023-02-15 DIAGNOSIS — I48.0 PAROXYSMAL ATRIAL FIBRILLATION: Primary | ICD-10-CM

## 2023-02-15 RX ORDER — SODIUM CHLORIDE 0.9 % (FLUSH) 0.9 %
10 SYRINGE (ML) INJECTION EVERY 12 HOURS SCHEDULED
Status: CANCELLED | OUTPATIENT
Start: 2023-02-15

## 2023-02-15 RX ORDER — SODIUM CHLORIDE 9 MG/ML
40 INJECTION, SOLUTION INTRAVENOUS AS NEEDED
Status: CANCELLED | OUTPATIENT
Start: 2023-02-15

## 2023-02-15 RX ORDER — SODIUM CHLORIDE 0.9 % (FLUSH) 0.9 %
10 SYRINGE (ML) INJECTION AS NEEDED
Status: CANCELLED | OUTPATIENT
Start: 2023-02-15

## 2023-02-17 ENCOUNTER — HOSPITAL ENCOUNTER (OUTPATIENT)
Dept: CARDIOLOGY | Facility: HOSPITAL | Age: 78
Discharge: HOME | End: 2023-02-17
Attending: INTERNAL MEDICINE | Admitting: INTERNAL MEDICINE
Payer: MEDICARE

## 2023-02-17 VITALS
TEMPERATURE: 98.1 F | HEIGHT: 66 IN | DIASTOLIC BLOOD PRESSURE: 66 MMHG | BODY MASS INDEX: 27.23 KG/M2 | WEIGHT: 169.4 LBS | OXYGEN SATURATION: 96 % | RESPIRATION RATE: 16 BRPM | SYSTOLIC BLOOD PRESSURE: 135 MMHG | HEART RATE: 62 BPM

## 2023-02-17 DIAGNOSIS — I48.0 PAROXYSMAL ATRIAL FIBRILLATION: ICD-10-CM

## 2023-02-17 LAB
ANION GAP SERPL CALCULATED.3IONS-SCNC: 12 MMOL/L (ref 5–15)
BUN SERPL-MCNC: 26 MG/DL (ref 8–23)
BUN/CREAT SERPL: 28.9 (ref 7–25)
CALCIUM SPEC-SCNC: 9.1 MG/DL (ref 8.6–10.5)
CHLORIDE SERPL-SCNC: 109 MMOL/L (ref 98–107)
CO2 SERPL-SCNC: 20 MMOL/L (ref 22–29)
CREAT SERPL-MCNC: 0.9 MG/DL (ref 0.57–1)
DEPRECATED RDW RBC AUTO: 43.3 FL (ref 37–54)
EGFRCR SERPLBLD CKD-EPI 2021: 66 ML/MIN/1.73
ERYTHROCYTE [DISTWIDTH] IN BLOOD BY AUTOMATED COUNT: 12.6 % (ref 12.3–15.4)
GLUCOSE SERPL-MCNC: 105 MG/DL (ref 65–99)
HCT VFR BLD AUTO: 33 % (ref 34–46.6)
HGB BLD-MCNC: 10.9 G/DL (ref 12–15.9)
MAXIMAL PREDICTED HEART RATE: 143 BPM
MCH RBC QN AUTO: 30.9 PG (ref 26.6–33)
MCHC RBC AUTO-ENTMCNC: 33 G/DL (ref 31.5–35.7)
MCV RBC AUTO: 93.5 FL (ref 79–97)
PLATELET # BLD AUTO: 349 10*3/MM3 (ref 140–450)
PMV BLD AUTO: 10.1 FL (ref 6–12)
POTASSIUM SERPL-SCNC: 4.2 MMOL/L (ref 3.5–5.2)
RBC # BLD AUTO: 3.53 10*6/MM3 (ref 3.77–5.28)
SODIUM SERPL-SCNC: 141 MMOL/L (ref 136–145)
STRESS TARGET HR: 122 BPM
WBC NRBC COR # BLD: 10.07 10*3/MM3 (ref 3.4–10.8)

## 2023-02-17 PROCEDURE — 92960 CARDIOVERSION ELECTRIC EXT: CPT

## 2023-02-17 PROCEDURE — 85027 COMPLETE CBC AUTOMATED: CPT | Performed by: HOSPITALIST

## 2023-02-17 PROCEDURE — 80048 BASIC METABOLIC PNL TOTAL CA: CPT | Performed by: HOSPITALIST

## 2023-02-17 PROCEDURE — 93010 ELECTROCARDIOGRAM REPORT: CPT | Performed by: INTERNAL MEDICINE

## 2023-02-17 PROCEDURE — 92960 CARDIOVERSION ELECTRIC EXT: CPT | Performed by: INTERNAL MEDICINE

## 2023-02-17 PROCEDURE — 36415 COLL VENOUS BLD VENIPUNCTURE: CPT

## 2023-02-17 PROCEDURE — 85014 HEMATOCRIT: CPT

## 2023-02-17 PROCEDURE — 93005 ELECTROCARDIOGRAM TRACING: CPT | Performed by: INTERNAL MEDICINE

## 2023-02-17 PROCEDURE — 80047 BASIC METABLC PNL IONIZED CA: CPT

## 2023-02-17 RX ORDER — FLUMAZENIL 0.1 MG/ML
0.5 INJECTION INTRAVENOUS ONCE AS NEEDED
Status: DISCONTINUED | OUTPATIENT
Start: 2023-02-17 | End: 2023-02-17 | Stop reason: HOSPADM

## 2023-02-17 RX ORDER — CARVEDILOL 3.12 MG/1
3.12 TABLET ORAL 2 TIMES DAILY
Qty: 180 TABLET | Refills: 1 | Status: SHIPPED | OUTPATIENT
Start: 2023-02-17

## 2023-02-17 RX ORDER — FLECAINIDE ACETATE 100 MG/1
100 TABLET ORAL 2 TIMES DAILY
Qty: 60 TABLET | Refills: 11 | Status: SHIPPED | OUTPATIENT
Start: 2023-02-17 | End: 2023-03-02

## 2023-02-17 RX ORDER — NALOXONE HCL 0.4 MG/ML
0.4 VIAL (ML) INJECTION ONCE AS NEEDED
Status: DISCONTINUED | OUTPATIENT
Start: 2023-02-17 | End: 2023-02-17 | Stop reason: HOSPADM

## 2023-02-17 RX ORDER — MIDAZOLAM HYDROCHLORIDE 1 MG/ML
2-8 INJECTION INTRAMUSCULAR; INTRAVENOUS ONCE AS NEEDED
Status: DISCONTINUED | OUTPATIENT
Start: 2023-02-17 | End: 2023-02-17 | Stop reason: HOSPADM

## 2023-02-17 RX ORDER — FENTANYL CITRATE 50 UG/ML
50-100 INJECTION, SOLUTION INTRAMUSCULAR; INTRAVENOUS ONCE AS NEEDED
Status: DISCONTINUED | OUTPATIENT
Start: 2023-02-17 | End: 2023-02-17 | Stop reason: HOSPADM

## 2023-02-17 RX ADMIN — Medication 40 MG: at 12:03

## 2023-02-17 NOTE — H&P
"  Pre-procedure History and Physical  [facility]      Patient:  Yumi Crooks  :  1945  MRN: [id number]    PCP:  Sofia Oquendo APRN  PHONE:  [phone]    DATE: 2023  ID: Yumi Crooks is a 77 y.o. female resident of [place], [place]     CC: PAF with RVR     PROBLEM LIST:   1. Sick Sinus Syndrome  a. Loop recorder interrogation 2022 demonstrating episodes of sinus bradycardia during waking hours, consistent with symptoms of fatigue.   b. Placement of [employer] pacemaker dual chamber.  2. Paroxysmal atrial fibrillation  a. CHADSVasc = 6  on Eliquis  b. Diagnosed on loop recorder interrogation 2021  c. Previous CVA    3. Hypertension  4. Hyperlipidemia  5. CVA 10/2020  a. MRI evidence of multiple small infarcts in the left splenium of the corpus callosum and medial left occipital lobe  b. EEG unremarkable  c. VIDAL 10/16/2020: EF 61 to 65%, no evidence of left atrial appendage thrombus, saline test negative for right to left atrial level shunt  d. Loop recorder implant 2020-later found to have diagnosis of atrial fibrillation 2021  6. History of breast cancer status post right mastectomy on Arimidex  7. Anxiety  8. Depression    BRIEF HPI: Mrs. Crooks is a 78 y/o female with above noted history who presents for ECV today. She was recently noted to have increased episodes of Afib with RVR and cardioversion was recommended. She presents in this regard.     Allergies:      Allergies   Allergen Reactions   • Lortab [Hydrocodone-Acetaminophen] Other (See Comments)     \"hyperventilate\"   • Lipitor [Atorvastatin] Myalgia   • Penicillins Other (See Comments)     Had too much as a kid   • Sulfa Antibiotics Other (See Comments)      \"too much as a kid...and, it ruined my teeth\"       MEDICATIONS:  Current Outpatient Medications   Medication Instructions   • amLODIPine (NORVASC) 10 mg, Oral, Every Morning   • anastrozole " (ARIMIDEX) 1 mg, Oral, Daily   • apixaban (ELIQUIS) 5 mg, Oral, 2 Times Daily, First dose Thursday 9/29/2022   • busPIRone (BUSPAR) 5 mg, Oral, 2 times daily   • carvedilol (COREG) 3.125 mg, Oral, 2 Times Daily With Meals   • clopidogrel (PLAVIX) 75 MG tablet Take 1 tablet by mouth once daily   • Coenzyme Q10 (COQ10 PO) 1 tablet, Oral, Daily   • esomeprazole (nexIUM) 40 MG capsule TAKE 1 CAPSULE BY MOUTH IN THE MORNING BEFORE BREAKFAST   • flecainide (TAMBOCOR) 100 mg, Oral, 2 Times Daily   • losartan (COZAAR) 100 mg, Oral, Daily   • Magnesium 250 MG tablet Oral, Daily   • Multiple Vitamins-Minerals (multivitamin, eye vitamin,) tablet tablet 1 tablet, Oral, Daily   • multivitamin with minerals tablet tablet 1 tablet, Oral, Daily   • pramipexole (MIRAPEX) 0.25 mg, Oral, Nightly   • rosuvastatin (CRESTOR) 5 MG tablet TAKE 1 TABLET EVERY NIGHT       Past medical & surgical history, social and family history reviewed in the electronic medical record.    ROS: Pertinent positives listed in the HPI and problem list above. All others reviewed and negative.     Physical Exam:   There were no vitals taken for this visit.    Constitutional:    Alert, cooperative, in no acute distress   Neck:     No Jugular venous distention, adenopathy, or thyromegaly noted.    Heart:    Regular rhythm and normal rate, normal S1 and S2, no murmurs,gallops, rubs, or clicks. No distinct PMI noted.    Lungs:     Clear to auscultation bilaterally, respirations regular, even and unlabored    Abdomen:     Soft nontender, nondistended, normal bowel sounds   Extremities:   No gross deformities, no edema, clubbing, or cyanosis.      Labs and Diagnostic Data:      Results from last 7 days   Lab Units 02/17/23  1151   WBC 10*3/mm3 10.07   HEMOGLOBIN g/dL 10.9*   HEMATOCRIT % 33.0*   PLATELETS 10*3/mm3 349     Lab Results   Component Value Date    CHOL 203 (H) 10/15/2020    TRIG 135 07/22/2022    HDL 48 07/22/2022    LDL 99 07/22/2022    AST 24 11/13/2020     ALT 30 11/13/2020                   IMPRESSION:  · 78 y/o female with SSS, PAF, HTN, HLD and history of CVA. She has had increased Afib with RVR and presents for ECV today. She is  anticoagulated with Eliquis.     PLAN:  · Procedure to perform: ECV.  Risks, benefits and alternatives to the procedure explained to the patient and she understands and wishes to proceed.       Electronically signed by Brunilda Keith PA-C, 02/17/23, 12:29 PM EST.

## 2023-02-20 ENCOUNTER — CLINICAL SUPPORT (OUTPATIENT)
Dept: CARDIOLOGY | Facility: CLINIC | Age: 78
End: 2023-02-20
Payer: MEDICARE

## 2023-02-20 DIAGNOSIS — I48.0 PAROXYSMAL ATRIAL FIBRILLATION: Primary | ICD-10-CM

## 2023-02-20 LAB
BUN BLDA-MCNC: 24 MG/DL (ref 8–26)
CA-I BLDA-SCNC: 1.27 MMOL/L (ref 1.2–1.32)
CHLORIDE BLDA-SCNC: 110 MMOL/L (ref 98–109)
CO2 BLDA-SCNC: 22 MMOL/L (ref 24–29)
CREAT BLDA-MCNC: 1.1 MG/DL (ref 0.6–1.3)
EGFRCR SERPLBLD CKD-EPI 2021: 51.9 ML/MIN/1.73
GLUCOSE BLDC GLUCOMTR-MCNC: 102 MG/DL (ref 70–130)
HCT VFR BLDA CALC: 32 % (ref 38–51)
HGB BLDA-MCNC: 10.9 G/DL (ref 12–17)
POTASSIUM BLDA-SCNC: 4 MMOL/L (ref 3.5–4.9)
SODIUM BLD-SCNC: 142 MMOL/L (ref 138–146)

## 2023-02-20 PROCEDURE — 93000 ELECTROCARDIOGRAM COMPLETE: CPT | Performed by: INTERNAL MEDICINE

## 2023-02-22 LAB
QT INTERVAL: 438 MS
QTC INTERVAL: 440 MS

## 2023-03-02 ENCOUNTER — OFFICE VISIT (OUTPATIENT)
Dept: CARDIOLOGY | Facility: CLINIC | Age: 78
End: 2023-03-02
Payer: MEDICARE

## 2023-03-02 VITALS
OXYGEN SATURATION: 96 % | WEIGHT: 161.8 LBS | BODY MASS INDEX: 26 KG/M2 | HEART RATE: 101 BPM | SYSTOLIC BLOOD PRESSURE: 100 MMHG | DIASTOLIC BLOOD PRESSURE: 68 MMHG | HEIGHT: 66 IN

## 2023-03-02 DIAGNOSIS — I49.5 SSS (SICK SINUS SYNDROME): Primary | ICD-10-CM

## 2023-03-02 DIAGNOSIS — I48.0 PAROXYSMAL ATRIAL FIBRILLATION: ICD-10-CM

## 2023-03-02 DIAGNOSIS — I10 ESSENTIAL HYPERTENSION: ICD-10-CM

## 2023-03-02 DIAGNOSIS — E78.2 MIXED HYPERLIPIDEMIA: ICD-10-CM

## 2023-03-02 DIAGNOSIS — I48.0 PAROXYSMAL ATRIAL FIBRILLATION: Primary | ICD-10-CM

## 2023-03-02 PROCEDURE — 93280 PM DEVICE PROGR EVAL DUAL: CPT | Performed by: INTERNAL MEDICINE

## 2023-03-02 PROCEDURE — 93000 ELECTROCARDIOGRAM COMPLETE: CPT | Performed by: INTERNAL MEDICINE

## 2023-03-02 PROCEDURE — 99214 OFFICE O/P EST MOD 30 MIN: CPT | Performed by: INTERNAL MEDICINE

## 2023-03-02 RX ORDER — AMLODIPINE BESYLATE 5 MG/1
5 TABLET ORAL DAILY
Qty: 30 TABLET | Refills: 6 | Status: SHIPPED | OUTPATIENT
Start: 2023-03-02

## 2023-03-02 RX ORDER — CETIRIZINE HYDROCHLORIDE 10 MG/1
10 TABLET ORAL DAILY
COMMUNITY

## 2023-03-02 RX ORDER — MONTELUKAST SODIUM 4 MG/1
1 TABLET, CHEWABLE ORAL DAILY
Qty: 30 TABLET | Refills: 6 | Status: SHIPPED | OUTPATIENT
Start: 2023-03-02

## 2023-03-02 NOTE — PROGRESS NOTES
"Yumi Crooks  1945  [phone]    03/02/2023    [facility] CARDIOLOGY MAIN CAMPUS     Referring Provider: No ref. provider found     Sofia Oquendo, APRN  [address]  [place] [address]    Chief Complaint   Patient presents with   • SSS (sick sinus syndrome       Problem List:  1. Sick Sinus Syndrome  a. Loop recorder interrogation 7/21/2022 demonstrating episodes of sinus bradycardia during waking hours, consistent with symptoms of fatigue.   b. 9/27/22 Placement of [employer] pacemaker dual chamber.  2. Paroxysmal atrial fibrillation  a. CHADSVasc = 6  on Eliquis  b. Diagnosed on loop recorder interrogation April 2021  c. Previous CVA 2020   d. Successful ECV 2/17/23  e. Initiated Flecainide  3. Hypertension  4. Hyperlipidemia  5. CVA 10/2020  a. MRI evidence of multiple small infarcts in the left splenium of the corpus callosum and medial left occipital lobe  b. EEG unremarkable  c. VIDAL 10/16/2020: EF 61 to 65%, no evidence of left atrial appendage thrombus, saline test negative for right to left atrial level shunt  d. Loop recorder implant 12/17/2020-later found to have diagnosis of atrial fibrillation April 2021  6. History of breast cancer status post right mastectomy on Arimidex  7. Anxiety  8. Depression    Allergies  Allergies   Allergen Reactions   • Lortab [Hydrocodone-Acetaminophen] Other (See Comments)     \"hyperventilate\"   • Lipitor [Atorvastatin] Myalgia   • Penicillins Other (See Comments)     Had too much as a kid   • Sulfa Antibiotics Other (See Comments)      \"too much as a kid...and, it ruined my teeth\"       Current Medications    Current Outpatient Medications:   •  amLODIPine (NORVASC) 10 MG tablet, Take 1 tablet by mouth Every Morning., Disp: , Rfl:   •  apixaban (Eliquis) 5 MG tablet tablet, Take 1 tablet by mouth 2 (Two) Times a Day. First dose Thursday 9/29/2022, Disp: 60 tablet, Rfl: 2  •  busPIRone (BUSPAR) 5 MG tablet, Take 1 tablet by " mouth 2 (two) times a day., Disp: , Rfl:   •  carvedilol (COREG) 3.125 MG tablet, Take 1 tablet by mouth 2 (Two) Times a Day., Disp: 180 tablet, Rfl: 1  •  cetirizine (zyrTEC) 10 MG tablet, Take 1 tablet by mouth Daily., Disp: , Rfl:   •  clopidogrel (PLAVIX) 75 MG tablet, Take 1 tablet by mouth once daily, Disp: 90 tablet, Rfl: 1  •  Coenzyme Q10 (COQ10 PO), Take 1 tablet by mouth Daily., Disp: , Rfl:   •  esomeprazole (nexIUM) 40 MG capsule, TAKE 1 CAPSULE BY MOUTH IN THE MORNING BEFORE BREAKFAST, Disp: 30 capsule, Rfl: 1  •  flecainide (TAMBOCOR) 100 MG tablet, Take 1 tablet by mouth 2 (Two) Times a Day., Disp: 60 tablet, Rfl: 11  •  losartan (COZAAR) 100 MG tablet, Take 1 tablet by mouth Daily., Disp: , Rfl:   •  Magnesium 250 MG tablet, Take  by mouth Daily., Disp: , Rfl:   •  Multiple Vitamins-Minerals (multivitamin, eye vitamin,) tablet tablet, Take 1 tablet by mouth Daily., Disp: , Rfl:   •  multivitamin with minerals tablet tablet, Take 1 tablet by mouth Daily., Disp: , Rfl:   •  pramipexole (MIRAPEX) 0.125 MG tablet, Take 2 tablets by mouth Every Night., Disp: , Rfl:   •  rosuvastatin (CRESTOR) 5 MG tablet, TAKE 1 TABLET EVERY NIGHT, Disp: 90 tablet, Rfl: 3    History of Present Illness     Pt presents for follow up status post pacemaker placement for sick sinus syndrome, hypertension, afib. She has been having episodes of palpitations, SOB, dizziness and LH since about two days after ECV in February 17, 2023. She was started on Flecainide in February and has felt nausea since starting on the medication. She has stopped taking every day due to the nausea. On interrogation today, she has had 10% afib burden since February 20, 2023. Since we last saw the pt, pt denies any CP. Denies any hospitalizations, ER visits, bleeding on Eliquis, or TIA/CVA symptoms. BP low     ROS:  General:  +fatigue, +weight loss (-8 lbs, lack of appetite since starting flecainide)  Cardiovascular:  Denies CP, PND, syncope, +near  "syncope, -edema +palpitations.  Pulmonary:  +MARTINEZ, -cough, or wheezing      Vitals:    03/02/23 0940   BP: 100/68   BP Location: Left arm   Patient Position: Sitting   Pulse: 101   SpO2: 96%   Weight: 73.4 kg (161 lb 12.8 oz)   Height: 167.6 cm (66\")     Body mass index is 26.12 kg/m².  PE:  General: NAD  Neck: no JVD, no carotid bruits, no TM  Heart irreg irreg rate and rhythm, NL S1, S2, no rubs, murmurs  Lungs: CTA, no wheezes, rhonchi, or rales  Abd: soft, non-tender, NL BS  Ext: No musculoskeletal deformities, no edema, cyanosis, or clubbing  Psych: normal mood and affect    Diagnostic Data:  [employer] pacemaker.  DDDR at 60.  A paced 57%.  RV paced 6%.  Normal P wave R wave thresholds impedance.  Battery voltage 6 years remaining.  Underlying rhythm Afib today, has been in current episode for 25 hours.  10% mode switch since February 20, 2023. PM adjusted accordingly      ECG 12 Lead    Date/Time: 3/2/2023 10:15 AM  Performed by: Chuck Chavez MD  Authorized by: Chuck Chavez MD   Comparison: compared with previous ECG from 2/17/2023  Comparison to previous ECG: afib has replaced NSR  Rhythm: atrial fibrillation  Rate: tachycardic  BPM: 101              1. SSS (sick sinus syndrome) (HCC)    2. Paroxysmal atrial fibrillation (HCC)    3. Essential hypertension    4. Mixed hyperlipidemia          Plan:        1. PAF   -CHADSVasc = 6  on Eliquis  -Diagnosed on loop recorder interrogation April 2021.   -Continue coreg.   -Recent ECV 2/17/23 and initiation of  Flecainide  -In afib today, PPM interrogation showed 10% afib burden: stop flecainide: Options include tikosyn, AVN RFA, or PVA. Pt wants tikosyn    2. Symptomatic SSS:   Chronic elevated RV lead threshold. Increase AVD to prevent RV pacing.    3. HTN: now with low BP: lower norvasc 5 mg po daily  -well controlled at home, continue present medications      4. HLD   -Continue statin      5. CVA: stop plavix: stay on eliquis    6. Diarrhea: add " cholesteryamine    Return for follow-up in 6 to 8 months or sooner as needed    Scribed for Chuck Chavez MD by DIONY Gustafson. 3/2/2023  09:55 EST     I, Chuck Chavez MD, personally performed the services described in this documentation as scribed by the above named individual in my presence, and it is both accurate and complete.  3/2/2023  10:31 EST

## 2023-03-02 NOTE — H&P (VIEW-ONLY)
"Yumi Crooks  1945  [phone]    03/02/2023    [facility] CARDIOLOGY MAIN CAMPUS     Referring Provider: No ref. provider found     Sofia Oquendo, APRN  [address]  [place] [address]    Chief Complaint   Patient presents with   • SSS (sick sinus syndrome       Problem List:  1. Sick Sinus Syndrome  a. Loop recorder interrogation 7/21/2022 demonstrating episodes of sinus bradycardia during waking hours, consistent with symptoms of fatigue.   b. 9/27/22 Placement of [employer] pacemaker dual chamber.  2. Paroxysmal atrial fibrillation  a. CHADSVasc = 6  on Eliquis  b. Diagnosed on loop recorder interrogation April 2021  c. Previous CVA 2020   d. Successful ECV 2/17/23  e. Initiated Flecainide  3. Hypertension  4. Hyperlipidemia  5. CVA 10/2020  a. MRI evidence of multiple small infarcts in the left splenium of the corpus callosum and medial left occipital lobe  b. EEG unremarkable  c. VIDAL 10/16/2020: EF 61 to 65%, no evidence of left atrial appendage thrombus, saline test negative for right to left atrial level shunt  d. Loop recorder implant 12/17/2020-later found to have diagnosis of atrial fibrillation April 2021  6. History of breast cancer status post right mastectomy on Arimidex  7. Anxiety  8. Depression    Allergies  Allergies   Allergen Reactions   • Lortab [Hydrocodone-Acetaminophen] Other (See Comments)     \"hyperventilate\"   • Lipitor [Atorvastatin] Myalgia   • Penicillins Other (See Comments)     Had too much as a kid   • Sulfa Antibiotics Other (See Comments)      \"too much as a kid...and, it ruined my teeth\"       Current Medications    Current Outpatient Medications:   •  amLODIPine (NORVASC) 10 MG tablet, Take 1 tablet by mouth Every Morning., Disp: , Rfl:   •  apixaban (Eliquis) 5 MG tablet tablet, Take 1 tablet by mouth 2 (Two) Times a Day. First dose Thursday 9/29/2022, Disp: 60 tablet, Rfl: 2  •  busPIRone (BUSPAR) 5 MG tablet, Take 1 tablet by " mouth 2 (two) times a day., Disp: , Rfl:   •  carvedilol (COREG) 3.125 MG tablet, Take 1 tablet by mouth 2 (Two) Times a Day., Disp: 180 tablet, Rfl: 1  •  cetirizine (zyrTEC) 10 MG tablet, Take 1 tablet by mouth Daily., Disp: , Rfl:   •  clopidogrel (PLAVIX) 75 MG tablet, Take 1 tablet by mouth once daily, Disp: 90 tablet, Rfl: 1  •  Coenzyme Q10 (COQ10 PO), Take 1 tablet by mouth Daily., Disp: , Rfl:   •  esomeprazole (nexIUM) 40 MG capsule, TAKE 1 CAPSULE BY MOUTH IN THE MORNING BEFORE BREAKFAST, Disp: 30 capsule, Rfl: 1  •  flecainide (TAMBOCOR) 100 MG tablet, Take 1 tablet by mouth 2 (Two) Times a Day., Disp: 60 tablet, Rfl: 11  •  losartan (COZAAR) 100 MG tablet, Take 1 tablet by mouth Daily., Disp: , Rfl:   •  Magnesium 250 MG tablet, Take  by mouth Daily., Disp: , Rfl:   •  Multiple Vitamins-Minerals (multivitamin, eye vitamin,) tablet tablet, Take 1 tablet by mouth Daily., Disp: , Rfl:   •  multivitamin with minerals tablet tablet, Take 1 tablet by mouth Daily., Disp: , Rfl:   •  pramipexole (MIRAPEX) 0.125 MG tablet, Take 2 tablets by mouth Every Night., Disp: , Rfl:   •  rosuvastatin (CRESTOR) 5 MG tablet, TAKE 1 TABLET EVERY NIGHT, Disp: 90 tablet, Rfl: 3    History of Present Illness     Pt presents for follow up status post pacemaker placement for sick sinus syndrome, hypertension, afib. She has been having episodes of palpitations, SOB, dizziness and LH since about two days after ECV in February 17, 2023. She was started on Flecainide in February and has felt nausea since starting on the medication. She has stopped taking every day due to the nausea. On interrogation today, she has had 10% afib burden since February 20, 2023. Since we last saw the pt, pt denies any CP. Denies any hospitalizations, ER visits, bleeding on Eliquis, or TIA/CVA symptoms. BP low     ROS:  General:  +fatigue, +weight loss (-8 lbs, lack of appetite since starting flecainide)  Cardiovascular:  Denies CP, PND, syncope, +near  "syncope, -edema +palpitations.  Pulmonary:  +MARTINEZ, -cough, or wheezing      Vitals:    03/02/23 0940   BP: 100/68   BP Location: Left arm   Patient Position: Sitting   Pulse: 101   SpO2: 96%   Weight: 73.4 kg (161 lb 12.8 oz)   Height: 167.6 cm (66\")     Body mass index is 26.12 kg/m².  PE:  General: NAD  Neck: no JVD, no carotid bruits, no TM  Heart irreg irreg rate and rhythm, NL S1, S2, no rubs, murmurs  Lungs: CTA, no wheezes, rhonchi, or rales  Abd: soft, non-tender, NL BS  Ext: No musculoskeletal deformities, no edema, cyanosis, or clubbing  Psych: normal mood and affect    Diagnostic Data:  [employer] pacemaker.  DDDR at 60.  A paced 57%.  RV paced 6%.  Normal P wave R wave thresholds impedance.  Battery voltage 6 years remaining.  Underlying rhythm Afib today, has been in current episode for 25 hours.  10% mode switch since February 20, 2023. PM adjusted accordingly      ECG 12 Lead    Date/Time: 3/2/2023 10:15 AM  Performed by: Chuck Chavez MD  Authorized by: Chuck Chavez MD   Comparison: compared with previous ECG from 2/17/2023  Comparison to previous ECG: afib has replaced NSR  Rhythm: atrial fibrillation  Rate: tachycardic  BPM: 101              1. SSS (sick sinus syndrome) (HCC)    2. Paroxysmal atrial fibrillation (HCC)    3. Essential hypertension    4. Mixed hyperlipidemia          Plan:        1. PAF   -CHADSVasc = 6  on Eliquis  -Diagnosed on loop recorder interrogation April 2021.   -Continue coreg.   -Recent ECV 2/17/23 and initiation of  Flecainide  -In afib today, PPM interrogation showed 10% afib burden: stop flecainide: Options include tikosyn, AVN RFA, or PVA. Pt wants tikosyn    2. Symptomatic SSS:   Chronic elevated RV lead threshold. Increase AVD to prevent RV pacing.    3. HTN: now with low BP: lower norvasc 5 mg po daily  -well controlled at home, continue present medications      4. HLD   -Continue statin      5. CVA: stop plavix: stay on eliquis    6. Diarrhea: add " cholesteryamine    Return for follow-up in 6 to 8 months or sooner as needed    Scribed for Chuck Chavez MD by DIONY Gustafson. 3/2/2023  09:55 EST     I, Chuck Chavez MD, personally performed the services described in this documentation as scribed by the above named individual in my presence, and it is both accurate and complete.  3/2/2023  10:31 EST

## 2023-03-08 ENCOUNTER — HOSPITAL ENCOUNTER (INPATIENT)
Facility: HOSPITAL | Age: 78
LOS: 3 days | Discharge: HOME | DRG: 310 | End: 2023-03-11
Attending: INTERNAL MEDICINE | Admitting: INTERNAL MEDICINE
Payer: MEDICARE

## 2023-03-08 DIAGNOSIS — I48.19 PERSISTENT ATRIAL FIBRILLATION: Primary | ICD-10-CM

## 2023-03-08 LAB
ANION GAP SERPL CALCULATED.3IONS-SCNC: 11 MMOL/L (ref 5–15)
BUN SERPL-MCNC: 22 MG/DL (ref 8–23)
BUN/CREAT SERPL: 23.9 (ref 7–25)
CALCIUM SPEC-SCNC: 9.2 MG/DL (ref 8.6–10.5)
CHLORIDE SERPL-SCNC: 106 MMOL/L (ref 98–107)
CO2 SERPL-SCNC: 26 MMOL/L (ref 22–29)
CREAT SERPL-MCNC: 0.92 MG/DL (ref 0.57–1)
EGFRCR SERPLBLD CKD-EPI 2021: 63.9 ML/MIN/1.73
GLUCOSE SERPL-MCNC: 92 MG/DL (ref 65–99)
MAGNESIUM SERPL-MCNC: 2 MG/DL (ref 1.6–2.4)
POTASSIUM SERPL-SCNC: 4.2 MMOL/L (ref 3.5–5.2)
SODIUM SERPL-SCNC: 143 MMOL/L (ref 136–145)

## 2023-03-08 PROCEDURE — 80048 BASIC METABOLIC PNL TOTAL CA: CPT | Performed by: INTERNAL MEDICINE

## 2023-03-08 PROCEDURE — 83735 ASSAY OF MAGNESIUM: CPT | Performed by: INTERNAL MEDICINE

## 2023-03-08 PROCEDURE — 93005 ELECTROCARDIOGRAM TRACING: CPT | Performed by: INTERNAL MEDICINE

## 2023-03-08 PROCEDURE — 93010 ELECTROCARDIOGRAM REPORT: CPT | Performed by: INTERNAL MEDICINE

## 2023-03-08 RX ORDER — DOFETILIDE 0.25 MG/1
250 CAPSULE ORAL EVERY 12 HOURS SCHEDULED
Status: COMPLETED | OUTPATIENT
Start: 2023-03-08 | End: 2023-03-08

## 2023-03-08 RX ORDER — PRAMIPEXOLE DIHYDROCHLORIDE 0.25 MG/1
0.25 TABLET ORAL NIGHTLY
Status: DISCONTINUED | OUTPATIENT
Start: 2023-03-08 | End: 2023-03-11 | Stop reason: HOSPADM

## 2023-03-08 RX ORDER — DOFETILIDE 0.25 MG/1
250 CAPSULE ORAL EVERY 12 HOURS SCHEDULED
Status: DISCONTINUED | OUTPATIENT
Start: 2023-03-10 | End: 2023-03-10

## 2023-03-08 RX ORDER — DOFETILIDE 0.25 MG/1
250 CAPSULE ORAL EVERY 12 HOURS SCHEDULED
Status: COMPLETED | OUTPATIENT
Start: 2023-03-09 | End: 2023-03-09

## 2023-03-08 RX ORDER — AMLODIPINE BESYLATE 5 MG/1
5 TABLET ORAL DAILY
Status: DISCONTINUED | OUTPATIENT
Start: 2023-03-09 | End: 2023-03-11 | Stop reason: HOSPADM

## 2023-03-08 RX ORDER — ROSUVASTATIN CALCIUM 10 MG/1
5 TABLET, COATED ORAL NIGHTLY
Status: DISCONTINUED | OUTPATIENT
Start: 2023-03-08 | End: 2023-03-11 | Stop reason: HOSPADM

## 2023-03-08 RX ORDER — LOSARTAN POTASSIUM 50 MG/1
100 TABLET ORAL DAILY
Status: DISCONTINUED | OUTPATIENT
Start: 2023-03-09 | End: 2023-03-11 | Stop reason: HOSPADM

## 2023-03-08 RX ORDER — BUSPIRONE HYDROCHLORIDE 10 MG/1
5 TABLET ORAL DAILY
Status: DISCONTINUED | OUTPATIENT
Start: 2023-03-09 | End: 2023-03-11 | Stop reason: HOSPADM

## 2023-03-08 RX ORDER — PANTOPRAZOLE SODIUM 40 MG/1
40 TABLET, DELAYED RELEASE ORAL
Status: DISCONTINUED | OUTPATIENT
Start: 2023-03-09 | End: 2023-03-11 | Stop reason: HOSPADM

## 2023-03-08 RX ORDER — CARVEDILOL 3.12 MG/1
3.12 TABLET ORAL 2 TIMES DAILY
Status: DISCONTINUED | OUTPATIENT
Start: 2023-03-08 | End: 2023-03-11 | Stop reason: HOSPADM

## 2023-03-08 RX ORDER — CETIRIZINE HYDROCHLORIDE 10 MG/1
10 TABLET ORAL DAILY
Status: DISCONTINUED | OUTPATIENT
Start: 2023-03-09 | End: 2023-03-09

## 2023-03-08 RX ORDER — CHOLESTYRAMINE LIGHT 4 G/5.7G
1 POWDER, FOR SUSPENSION ORAL DAILY
Status: DISCONTINUED | OUTPATIENT
Start: 2023-03-09 | End: 2023-03-09

## 2023-03-08 RX ADMIN — PRAMIPEXOLE DIHYDROCHLORIDE 0.25 MG: 0.25 TABLET ORAL at 19:56

## 2023-03-08 RX ADMIN — CARVEDILOL 3.12 MG: 3.12 TABLET, FILM COATED ORAL at 19:57

## 2023-03-08 RX ADMIN — DOFETILIDE 250 MCG: 0.25 CAPSULE ORAL at 17:04

## 2023-03-08 RX ADMIN — ROSUVASTATIN CALCIUM 5 MG: 10 TABLET, COATED ORAL at 19:55

## 2023-03-08 RX ADMIN — APIXABAN 5 MG: 5 TABLET, FILM COATED ORAL at 19:56

## 2023-03-08 NOTE — INTERVAL H&P NOTE
H&P reviewed. The patient was examined and there are no changes to the H&P.       EP Pre-Procedure Report  Cardiovascular Laboratory  [facility]    Patient:  Yumi Crooks  :  1945  PCP:  Sofia Oquendo APRN  PHONE:  [phone]    DATE: 3/8/2023      MEDICATIONS:  Prior to Admission medications    Medication Sig Start Date End Date Taking? Authorizing Provider   amLODIPine (NORVASC) 5 MG tablet Take 1 tablet by mouth Daily. 3/2/23  Yes Chuck Chavez MD   apixaban (Eliquis) 5 MG tablet tablet Take 1 tablet by mouth 2 (Two) Times a Day. First dose 2022  Yes Chuck Chavez MD   busPIRone (BUSPAR) 5 MG tablet Take 1 tablet by mouth 2 (two) times a day.   Yes Abbey Benson MD   carvedilol (COREG) 3.125 MG tablet Take 1 tablet by mouth 2 (Two) Times a Day. 23  Yes Nicanor Vega III, MD   Coenzyme Q10 (COQ10 PO) Take 1 tablet by mouth Daily.   Yes Abbey Benson MD   colestipol (Colestid) 1 g tablet Take 1 tablet by mouth Daily. 3/2/23  Yes Chuck Chavez MD   esomeprazole (nexIUM) 40 MG capsule TAKE 1 CAPSULE BY MOUTH IN THE MORNING BEFORE BREAKFAST 21  Yes Stevenson Pereyra MD   losartan (COZAAR) 100 MG tablet Take 1 tablet by mouth Daily.   Yes Abbey Benson MD   Multiple Vitamins-Minerals (multivitamin, eye vitamin,) tablet tablet Take 1 tablet by mouth Daily.   Yes Abbey Benson MD   multivitamin with minerals tablet tablet Take 1 tablet by mouth Daily.   Yes Abbey Benson MD   pramipexole (MIRAPEX) 0.125 MG tablet Take 2 tablets by mouth Every Night.   Yes Abbey Benson MD   rosuvastatin (CRESTOR) 5 MG tablet TAKE 1 TABLET EVERY NIGHT 22  Yes Berna Araiza MD   cetirizine (zyrTEC) 10 MG tablet Take 1 tablet by mouth Daily.    Abbey Benson MD   Magnesium 250 MG tablet Take  by mouth Daily.    Provider, Historical, MD       Past medical & surgical history, social and family  history reviewed in the electronic medical record.    Physical Exam:    Vitals:   Vitals:    03/08/23 1327   BP: 165/86   Pulse: 61   Resp: 16   Temp: 97.9 °F (36.6 °C)   SpO2: 96%          03/08/23  1327   Weight: 73.2 kg (161 lb 6.4 oz)   Body mass index is 26.05 kg/m².    GENERAL: No apparent distress.  No significant changes since last exam.  CHEST: Clear to auscultation bilaterally no stridor no wheeze.  CV: S1, S2, Regular without Murmurs, Rubs or Gallops  EXTREMITIES: No edema.                Estimated Creatinine Clearance: 43.2 mL/min (by C-G formula based on SCr of 1.1 mg/dL).    IMPRESSION:PAF      PLAN:  · Tikosyn initiation        Electronically signed by YOSHI Martinez, 03/08/23, 2:34 PM EST.    I have read the above note and agree

## 2023-03-08 NOTE — PROGRESS NOTES
"Pharmacy Consult - Tikosyn Initiation    Yumi Crooks is a 78 y.o. female admitted for Tikosyn initiation and monitoring.    Height: 167.6 cm (66\")  Weight: 73.2 kg (161 lb 6.4 oz)    Evaluation of Drug-Drug Interactions     Previous antiarrhythmic medications must be discontinued prior to admission       - Amiodarone must be discontinued >3 weeks prior to Tikosyn start       - Sotalol and class I antiarrhythmics (Dysopyramide, Flecainide, Mexiletine, Propafenone) must be discontinued >48 hours prior to Tikosyn start         - Previous antiarrhythmic therapy: Flecainide (last dose 3/2)    Current drug-drug interactions noted with admission medications and Tikosyn    The following medications are contraindicated with Dofetilide and will be/have been discontinued:  - Fluoroquinolones (Ciprofloxacin, Levofloxacin, Moxifloxacin, Norfloxacin)  - Azole antifungals (Itraconazole, Ketoconazole, Posaconazole)  - Hydrochlorothiazide and any combination products containing Hydrochlorothiazide   - Trimethoprim and Trimethoprim-Sulfamethoxazole   - Verapamil  - Cimetidine  - Ziprasidone   - Dolutegravir  - Sauinavir  - Thioridazine   - Fingolimod  - Megestrol  - Pimozide  - Prochloperazine  - Lamotrigine  - Ibutilide  - Procainamide  - Macrolide antibiotics (Erythromycin, Clarithromycin)  - Quetiapine  - Citalopram    Laboratory    Results from last 7 days   Lab Units 03/08/23  1407   SODIUM mmol/L 143   POTASSIUM mmol/L 4.2   CHLORIDE mmol/L 106   CO2 mmol/L 26.0   BUN mg/dL 22   CREATININE mg/dL 0.92   GLUCOSE mg/dL 92   CALCIUM mg/dL 9.2     Results from last 7 days   Lab Units 03/08/23  1407   MAGNESIUM mg/dL 2.0       Pharmacy will order electrolyte replacement per protocols if indicated (Mag < 2.0, K < 4.0) based on laboratory values on admission      - Magnesium replacement protocol ordered: Yes     - Potassium replacement protocol ordered: Yes    Estimated CrCl =  58.0 mL/min  (Calculated with Cockroft-Gault " equation using actual body weight and serum creatinine for calculation--can use laboratory values from previous 24 hours)    Initial QTc and EKG Monitoring    QTc = 478    If QTc is:     < 440 msec: okay to proceed with initiation      > 440 msec: must contact MD or physician extender (DIONY/PHILL)             - Provider contacted: YOSHI Martinez            - Okay to proceed: Yes                     Patient has a functioning atrial pacemaker - Yes     Cardiology aware, will proceed    Initial Tikosyn dose based on Creatinine Clearance:    CrCl 40-60 mL/min - Dofetilide 250 mcg PO Q12H    (Will dose medication 10 hour intervals until administration times are between 7 and 9).    Assessment/Plan:    Patient admitted for Tikosyn initiation per cardiology.   Will initiate Tikosyn 250 mcg PO every 12 hours.  Monitor electrolytes and drug-drug interactions.  Pharmacy will continue to follow.

## 2023-03-09 PROBLEM — I48.91 ATRIAL FIBRILLATION (HCC): Status: ACTIVE | Noted: 2023-03-09

## 2023-03-09 LAB
ANION GAP SERPL CALCULATED.3IONS-SCNC: 11 MMOL/L (ref 5–15)
BUN SERPL-MCNC: 20 MG/DL (ref 8–23)
BUN/CREAT SERPL: 27 (ref 7–25)
CALCIUM SPEC-SCNC: 9 MG/DL (ref 8.6–10.5)
CHLORIDE SERPL-SCNC: 106 MMOL/L (ref 98–107)
CO2 SERPL-SCNC: 23 MMOL/L (ref 22–29)
CREAT SERPL-MCNC: 0.74 MG/DL (ref 0.57–1)
EGFRCR SERPLBLD CKD-EPI 2021: 82.9 ML/MIN/1.73
GLUCOSE SERPL-MCNC: 95 MG/DL (ref 65–99)
MAGNESIUM SERPL-MCNC: 1.8 MG/DL (ref 1.6–2.4)
POTASSIUM SERPL-SCNC: 4 MMOL/L (ref 3.5–5.2)
QT INTERVAL: 498 MS
QTC INTERVAL: 509 MS
SODIUM SERPL-SCNC: 140 MMOL/L (ref 136–145)

## 2023-03-09 PROCEDURE — 0 MAGNESIUM SULFATE 4 GM/100ML SOLUTION: Performed by: INTERNAL MEDICINE

## 2023-03-09 PROCEDURE — 93005 ELECTROCARDIOGRAM TRACING: CPT | Performed by: PHYSICIAN ASSISTANT

## 2023-03-09 PROCEDURE — 99232 SBSQ HOSP IP/OBS MODERATE 35: CPT

## 2023-03-09 PROCEDURE — 93010 ELECTROCARDIOGRAM REPORT: CPT | Performed by: STUDENT IN AN ORGANIZED HEALTH CARE EDUCATION/TRAINING PROGRAM

## 2023-03-09 PROCEDURE — 80048 BASIC METABOLIC PNL TOTAL CA: CPT | Performed by: PHYSICIAN ASSISTANT

## 2023-03-09 PROCEDURE — 83735 ASSAY OF MAGNESIUM: CPT

## 2023-03-09 RX ORDER — MONTELUKAST SODIUM 4 MG/1
1 TABLET, CHEWABLE ORAL DAILY
Status: DISCONTINUED | OUTPATIENT
Start: 2023-03-09 | End: 2023-03-11 | Stop reason: HOSPADM

## 2023-03-09 RX ORDER — MAGNESIUM SULFATE HEPTAHYDRATE 40 MG/ML
4 INJECTION, SOLUTION INTRAVENOUS ONCE
Status: COMPLETED | OUTPATIENT
Start: 2023-03-09 | End: 2023-03-09

## 2023-03-09 RX ADMIN — APIXABAN 5 MG: 5 TABLET, FILM COATED ORAL at 10:09

## 2023-03-09 RX ADMIN — LOSARTAN POTASSIUM 100 MG: 50 TABLET, FILM COATED ORAL at 10:08

## 2023-03-09 RX ADMIN — CARVEDILOL 3.12 MG: 3.12 TABLET, FILM COATED ORAL at 21:25

## 2023-03-09 RX ADMIN — BUSPIRONE HYDROCHLORIDE 5 MG: 10 TABLET ORAL at 10:08

## 2023-03-09 RX ADMIN — ROSUVASTATIN CALCIUM 5 MG: 10 TABLET, COATED ORAL at 21:25

## 2023-03-09 RX ADMIN — DOFETILIDE 250 MCG: 0.25 CAPSULE ORAL at 02:49

## 2023-03-09 RX ADMIN — APIXABAN 5 MG: 5 TABLET, FILM COATED ORAL at 21:25

## 2023-03-09 RX ADMIN — PANTOPRAZOLE SODIUM 40 MG: 40 TABLET, DELAYED RELEASE ORAL at 05:01

## 2023-03-09 RX ADMIN — PRAMIPEXOLE DIHYDROCHLORIDE 0.25 MG: 0.25 TABLET ORAL at 21:25

## 2023-03-09 RX ADMIN — AMLODIPINE BESYLATE 5 MG: 5 TABLET ORAL at 10:09

## 2023-03-09 RX ADMIN — DOFETILIDE 250 MCG: 0.25 CAPSULE ORAL at 12:58

## 2023-03-09 RX ADMIN — MAGNESIUM SULFATE HEPTAHYDRATE 4 G: 40 INJECTION, SOLUTION INTRAVENOUS at 15:24

## 2023-03-09 RX ADMIN — COLESTIPOL HYDROCHLORIDE 1 G: 1 TABLET ORAL at 15:02

## 2023-03-09 RX ADMIN — DOFETILIDE 250 MCG: 0.25 CAPSULE ORAL at 23:02

## 2023-03-09 RX ADMIN — CARVEDILOL 3.12 MG: 3.12 TABLET, FILM COATED ORAL at 10:09

## 2023-03-09 NOTE — PROGRESS NOTES
[facility]  Progress Note     LOS: 1 day   Patient Care Team:  Sofia Oquendo APRN as PCP - General (Family Medicine)  Nicanor Vega III, MD as Cardiologist (Cardiology)  Fermin Jara PA as Physician Assistant (Cardiology)  Chuck Chavez MD as Consulting Physician (Cardiac Electrophysiology)    Chief Complaint:  Atrial Fibrillation    Subjective     Interval History:   Doing ok. A little upset about having to wear telemetry monitor and IV this morning and worried about her  who she cares for at home. Her children are checking on him until she is able to get home. Denies palpitations, SOB, dizziness, LH, CP. No side effects from Tikosyn.       Review of Systems:   Pertinent positives in HPI, all others reviewed and negative.      Objective       Current Facility-Administered Medications:   •  !Home Medications [facility]. Please contact prior to discharge, , Does not apply, BID, Georgette Trevizo, [facility]  •  amLODIPine (NORVASC) tablet 5 mg, 5 mg, Oral, Daily, Fermin Hoyt PA  •  apixaban (ELIQUIS) tablet 5 mg, 5 mg, Oral, BID, Fermin Hoyt, PA, 5 mg at 03/08/23 1956  •  busPIRone (BUSPAR) tablet 5 mg, 5 mg, Oral, Daily, Fermin Hoyt PA  •  Calcium Replacement - Initiate Nurse / BPA Driven Protocol, , Does not apply, PRN, Fermin Hoyt PA  •  carvedilol (COREG) tablet 3.125 mg, 3.125 mg, Oral, BID, Fermin Hoyt PA, 3.125 mg at 03/08/23 1957  •  cetirizine (zyrTEC) tablet 10 mg, 10 mg, Oral, Daily, Fermin Hoyt PA  •  cholestyramine light packet 4 g, 1 packet, Oral, Daily, Fermin Hoyt PA  •  [COMPLETED] dofetilide (TIKOSYN) capsule 250 mcg, 250 mcg, Oral, Q12H, 250 mcg at 03/08/23 1704 **FOLLOWED BY** [COMPLETED] dofetilide (TIKOSYN) capsule 250 mcg, 250 mcg, Oral, Q12H, 250 mcg at 03/09/23 0249 **FOLLOWED BY** dofetilide (TIKOSYN) capsule 250 mcg, 250 mcg, Oral, Q12H **FOLLOWED BY** dofetilide  "(TIKOSYN) capsule 250 mcg, 250 mcg, Oral, Q12H **FOLLOWED BY** [START ON 3/10/2023] dofetilide (TIKOSYN) capsule 250 mcg, 250 mcg, Oral, Q12H, Fermin Hoyt PA  •  losartan (COZAAR) tablet 100 mg, 100 mg, Oral, Daily, Fermin Hoyt PA  •  Magnesium Standard Dose Replacement - Initiate Nurse / BPA Driven Protocol, , Does not apply, PRN, Fermin Hoyt PA  •  pantoprazole (PROTONIX) EC tablet 40 mg, 40 mg, Oral, Q AM, Fermin Hoyt PA, 40 mg at 03/09/23 0501  •  Pharmacy Consult, , Does not apply, Once PRN, Fermin Hoyt PA  •  Phosphorus Replacement - Initiate Nurse / BPA Driven Protocol, , Does not apply, PRN, Fermin Hoyt PA  •  Potassium Replacement - Initiate Nurse / BPA Driven Protocol, , Does not apply, PRN, Fermin Hoyt PA  •  pramipexole (MIRAPEX) tablet 0.25 mg, 0.25 mg, Oral, Nightly, Fermin Hoyt PA, 0.25 mg at 03/08/23 1956  •  rosuvastatin (CRESTOR) tablet 5 mg, 5 mg, Oral, Nightly, Fermin Hoyt PA, 5 mg at 03/08/23 1955      Vital Sign Min/Max for last 24 hours  Temp  Min: 97.9 °F (36.6 °C)  Max: 98.4 °F (36.9 °C)   BP  Min: 148/82  Max: 165/85   Pulse  Min: 60  Max: 62   Resp  Min: 16  Max: 19   SpO2  Min: 93 %  Max: 100 %   No data recorded   Weight  Min: 73.2 kg (161 lb 6.4 oz)  Max: 73.2 kg (161 lb 6.4 oz)     Flowsheet Rows    Flowsheet Row First Filed Value   Admission Height 167.6 cm (66\") Documented at 03/08/2023 1327   Admission Weight 73.2 kg (161 lb 6.4 oz) Documented at 03/08/2023 1327          Physical Exam:     General Appearance:    Alert, cooperative, in no acute distress   Lungs:     Clear to auscultation,respirations regular, even and                  unlabored    Heart:    Regular rhythm and normal rate, normal S1 and S2, no            murmur, no gallop, no rub, no click   Chest Wall:    No abnormalities observed   Abdomen:     Normal bowel sounds, no masses, no organomegaly, soft        non-tender, non-distended, no guarding, " no rebound                tenderness   Extremities:   Moves all extremities well, no edema, no cyanosis, no             redness   Pulses:   Pulses palpable and equal bilaterally   Skin:   No bleeding, bruising or rash        Results Review:       Results from last 7 days   Lab Units 03/09/23  0334 03/08/23  1407   SODIUM mmol/L 140 143   POTASSIUM mmol/L 4.0 4.2   CHLORIDE mmol/L 106 106   CO2 mmol/L 23.0 26.0   BUN mg/dL 20 22   CREATININE mg/dL 0.74 0.92   GLUCOSE mg/dL 95 92                  Magnesium: 1.8  Ionized Calcium: 1.27      Intake/Output Summary (Last 24 hours) at 3/9/2023 0818  Last data filed at 3/9/2023 0527  Gross per 24 hour   Intake 525 ml   Output --   Net 525 ml       I personally viewed and interpreted the patient's EKG/Telemetry data      EKG: Atrial paced, HR 63 bpm, QTc 450 ms    Telemetry: A-paced, HR 60-62 bpm      Present on Admission:  **None**    Assessment & Plan   1) Atrial Fibrillation: Admitted for Tikosyn. Tikosyn initiated at 250mg BID 3/8/23 evening. In atrial paced rhythm this morning.  QTC is acceptable and will continue current dose of Tikosyn.   2) Anticoagulation: Continue Eliquis  3)HTN : well controlled on home regimen, continue       Plan for disposition: EKG in a.m. Continue to monitor QTc. Possibly home tomorrow evening.         Electronically signed by DIONY Jean, 03/09/23, 8:25 AM EST.

## 2023-03-09 NOTE — CASE MANAGEMENT/SOCIAL WORK
Discharge Planning Assessment  [facility]     Patient Name: Yumi Crooks  MRN: [id number]  Today's Date: 3/9/2023    Admit Date: 3/8/2023    Plan: Home   Discharge Needs Assessment     Row Name 03/09/23 0938       Living Environment    People in Home spouse    Current Living Arrangements home    Potentially Unsafe Housing Conditions unable to assess    Primary Care Provided by self    Provides Primary Care For spouse    Family Caregiver if Needed child(jovani), adult    Family Caregiver Names Sofia, daughter, and son    Quality of Family Relationships unable to assess    Able to Return to Prior Arrangements yes       Resource/Environmental Concerns    Resource/Environmental Concerns none    Transportation Concerns none       Food Insecurity    Within the past 12 months, you worried that your food would run out before you got the money to buy more. Never true    Within the past 12 months, the food you bought just didn't last and you didn't have money to get more. Never true       Transition Planning    Patient/Family Anticipates Transition to home    Patient/Family Anticipated Services at Transition     Transportation Anticipated family or friend will provide       Discharge Needs Assessment    Readmission Within the Last 30 Days no previous admission in last 30 days    Equipment Currently Used at Home none    Concerns to be Addressed denies needs/concerns at this time    Anticipated Changes Related to Illness none    Equipment Needed After Discharge none               Discharge Plan     Row Name 03/09/23 0939       Plan    Plan Home    Patient/Family in Agreement with Plan yes    Plan Comments Spoke with patient at bedside to initiate discharge planning.  Patient states she and her  live in [place] [facility] [place]; her PCP is Sofia Oquendo; and insurance is [employer].  Patient states she is independent with ADLs and mobility; she denies DME and HH.  Patient is here for Tikosyn administration  and agrees to use [facility] retail pharmacy for [id number].  Patient states her family will transport her home at discharge.  Patient denies needs at this time.  CM will continue to follow and assist as needed.    Final Discharge Disposition Code 01 - home or self-care              Continued Care and Services - Admitted Since 3/8/2023    Coordination has not been started for this encounter.       Expected Discharge Date and Time     Expected Discharge Date Expected Discharge Time    Mar 10, 2023          Demographic Summary     Row Name 03/09/23 0937       General Information    Arrived From home    Referral Source admission list    Reason for Consult discharge planning    Preferred Language English       Contact Information    Permission Granted to Share Info With                Functional Status     Row Name 03/09/23 0937       Functional Status    Usual Activity Tolerance excellent    Current Activity Tolerance excellent       Functional Status, IADL    Medications independent    Meal Preparation independent    Housekeeping independent    Laundry independent    Shopping independent               Psychosocial    No documentation.                Abuse/Neglect    No documentation.                Legal    No documentation.                Substance Abuse    No documentation.                Patient Forms    No documentation.                   Veronika Araiza RN

## 2023-03-09 NOTE — PROGRESS NOTES
"                    Clinical Nutrition     Patient Name: Yumi Crooks  YOB: 1945  MRN: [id number]  Date of Encounter: 03/09/23 10:09 EST  Admission date: 3/8/2023    Reason for Visit   Unintended wt loss, Reduced oral intake    EMR Reviewed: Yes     Diet Nutrition Related History:      Patient reports not being as big of an eater as she gets older and states she's never been much of a breakfast eater. Patient's weight has fluctuated between 159 lbs and 170 lbs since 11/9//2020. Currently, patient weighed 161 lbs on her bed scale yesterday. Patient reports a smaller appetite for about week (didn't know how much less she was eating) when she was put on a medication that made her nauseated but she is no longer taking it and her appetite is back to normal. Patient declined offer of ONS. NKFA, no issues with chew/swallow and no recent N/V/D. Last reported BM was yesterday, 3/8/23.      Anthropometrics   Height: Height: 167.6 cm (66\")  Admission Weight: 161 lbs, MD visit  Flowsheet Rows    Flowsheet Row First Filed Value   Admission Height 167.6 cm (66\") Documented at 03/08/2023 1327   Admission Weight 73.2 kg (161 lb 6.4 oz) Documented at 03/08/2023 1327        Last Filed Weight:Weight: 73.2 kg (161 lb 6.4 oz) (03/08/23 1327)  Weight Method: Bed scale  BMI: BMI (Calculated): 26.1  BMI classification: Overweight: 25.0-29.9kg/m2    IBW: Ideal Body Weight (IBW) (kg): 59.58    UBW: 168 lbs at MD visit on 12/20/22  Weight change:  - 7 lbs    % change:  4%  Time frame: 2.5 months     3/8/2023 73.211 kg 161 lb 6.4 oz Bed scale -   3/2/2023 73.392 kg 161 lb 12.8 oz - Report   2/17/2023 76.839 kg 169 lb 6.4 oz Standing scale -   12/20/2022 76.204 kg 168 lb - Report   9/27/2022 72.4 kg 159 lb 9.8 oz Standing scale -   8/24/2022 73.029 kg 161 lb - Report   7/22/2022 73.029 kg 161 lb - Report   2/28/2022 77.111 kg 170 lb - Report   8/25/2021 73.483 kg 162 lb - Report   7/23/2021 75.66 kg 166 lb 12.8 oz - Report "   2021 74.844 kg 165 lb - Report   2021 73.256 kg 161 lb 8 oz - Report   2020 73.2 kg 161 lb 6 oz Standing scale -   2020 74.844 kg 165 lb - Report   2020 74.844 kg 165 lb - -        Current Nutrition Prescription     Diet: Cardiac Diets; Healthy Heart (2-3 Na+); Texture: Regular Texture (IDDSI 7); Fluid Consistency: Thin (IDDSI 0)    Average Intake from [employer] of breakfast this morning. (Insufficient data)    Intake History:     Intake Category  Unable to determine at this time.    Actions   Appropriateness for MSA:  Criteria for further malnutrition exam not met at this time. Follow per protocol.     Interventions:   Follow treatment progress, Care plan reviewed, Advise alternate selection, Interview for preferences, Menu provided, Encourage intake, Supplement offered/refused    Ml Zheng,   Time Spent: 20 minutes

## 2023-03-10 LAB
ANION GAP SERPL CALCULATED.3IONS-SCNC: 12 MMOL/L (ref 5–15)
BUN SERPL-MCNC: 26 MG/DL (ref 8–23)
BUN/CREAT SERPL: 27.4 (ref 7–25)
CALCIUM SPEC-SCNC: 9.6 MG/DL (ref 8.6–10.5)
CHLORIDE SERPL-SCNC: 104 MMOL/L (ref 98–107)
CO2 SERPL-SCNC: 26 MMOL/L (ref 22–29)
CREAT SERPL-MCNC: 0.95 MG/DL (ref 0.57–1)
EGFRCR SERPLBLD CKD-EPI 2021: 61.5 ML/MIN/1.73
GLUCOSE SERPL-MCNC: 96 MG/DL (ref 65–99)
MAGNESIUM SERPL-MCNC: 2.9 MG/DL (ref 1.6–2.4)
POTASSIUM SERPL-SCNC: 4.2 MMOL/L (ref 3.5–5.2)
QT INTERVAL: 502 MS
QTC INTERVAL: 509 MS
SODIUM SERPL-SCNC: 142 MMOL/L (ref 136–145)

## 2023-03-10 PROCEDURE — 83735 ASSAY OF MAGNESIUM: CPT | Performed by: INTERNAL MEDICINE

## 2023-03-10 PROCEDURE — 93005 ELECTROCARDIOGRAM TRACING: CPT

## 2023-03-10 PROCEDURE — 93010 ELECTROCARDIOGRAM REPORT: CPT | Performed by: INTERNAL MEDICINE

## 2023-03-10 PROCEDURE — 93005 ELECTROCARDIOGRAM TRACING: CPT | Performed by: PHYSICIAN ASSISTANT

## 2023-03-10 PROCEDURE — 99232 SBSQ HOSP IP/OBS MODERATE 35: CPT | Performed by: INTERNAL MEDICINE

## 2023-03-10 PROCEDURE — 80048 BASIC METABOLIC PNL TOTAL CA: CPT | Performed by: PHYSICIAN ASSISTANT

## 2023-03-10 RX ORDER — DOFETILIDE 0.12 MG/1
125 CAPSULE ORAL EVERY 12 HOURS SCHEDULED
Qty: 60 CAPSULE | Refills: 5 | Status: CANCELLED | OUTPATIENT
Start: 2023-03-10

## 2023-03-10 RX ORDER — DOFETILIDE 0.12 MG/1
125 CAPSULE ORAL EVERY 12 HOURS SCHEDULED
Status: DISCONTINUED | OUTPATIENT
Start: 2023-03-10 | End: 2023-03-11 | Stop reason: HOSPADM

## 2023-03-10 RX ORDER — DOFETILIDE 0.25 MG/1
250 CAPSULE ORAL EVERY 12 HOURS SCHEDULED
Qty: 60 CAPSULE | Refills: 1 | Status: CANCELLED | OUTPATIENT
Start: 2023-03-10

## 2023-03-10 RX ADMIN — CARVEDILOL 3.12 MG: 3.12 TABLET, FILM COATED ORAL at 21:05

## 2023-03-10 RX ADMIN — LOSARTAN POTASSIUM 100 MG: 50 TABLET, FILM COATED ORAL at 09:17

## 2023-03-10 RX ADMIN — CARVEDILOL 3.12 MG: 3.12 TABLET, FILM COATED ORAL at 09:18

## 2023-03-10 RX ADMIN — AMLODIPINE BESYLATE 5 MG: 5 TABLET ORAL at 09:17

## 2023-03-10 RX ADMIN — COLESTIPOL HYDROCHLORIDE 1 G: 1 TABLET ORAL at 09:19

## 2023-03-10 RX ADMIN — ROSUVASTATIN CALCIUM 5 MG: 10 TABLET, COATED ORAL at 21:05

## 2023-03-10 RX ADMIN — DOFETILIDE 250 MCG: 0.25 CAPSULE ORAL at 09:18

## 2023-03-10 RX ADMIN — APIXABAN 5 MG: 5 TABLET, FILM COATED ORAL at 21:05

## 2023-03-10 RX ADMIN — DOFETILIDE 125 MCG: 0.12 CAPSULE ORAL at 21:05

## 2023-03-10 RX ADMIN — PRAMIPEXOLE DIHYDROCHLORIDE 0.25 MG: 0.25 TABLET ORAL at 21:05

## 2023-03-10 RX ADMIN — BUSPIRONE HYDROCHLORIDE 5 MG: 10 TABLET ORAL at 09:17

## 2023-03-10 RX ADMIN — PANTOPRAZOLE SODIUM 40 MG: 40 TABLET, DELAYED RELEASE ORAL at 06:14

## 2023-03-10 RX ADMIN — APIXABAN 5 MG: 5 TABLET, FILM COATED ORAL at 09:17

## 2023-03-10 NOTE — CASE MANAGEMENT/SOCIAL WORK
Continued Stay Note  [facility]     Patient Name: Yumi Crooks  MRN: [id number]  Today's Date: 3/10/2023    Admit Date: 3/8/2023    Plan: Home   Discharge Plan     Row Name 03/10/23 0947       Plan    Plan Home    Patient/Family in Agreement with Plan yes    Plan Comments Patient here for Tikosyn administration and will probably go home this evening.  Family will provide transportation.  Patient is [id number].  Patient denies needs at this time.               Discharge Codes    No documentation.               Expected Discharge Date and Time     Expected Discharge Date Expected Discharge Time    Mar 10, 2023             Veronika Araiza RN

## 2023-03-10 NOTE — PROGRESS NOTES
[facility]  Progress Note     LOS: 2 days   Patient Care Team:  Sofia Oquendo APRN as PCP - General (Family Medicine)  Nicanor Vega III, MD as Cardiologist (Cardiology)  Fermin Jara PA as Physician Assistant (Cardiology)  Chuck Chavez MD as Consulting Physician (Cardiac Electrophysiology)    Chief Complaint:  Atrial Fibrillation    Subjective        Doing well. No side effects to Tikosyn. Remains A paced. No CP or SOB.  Doing well without problems.      Review of Systems:   Pertinent positives in HPI, all others reviewed and negative.      Objective       Current Facility-Administered Medications:   •  ![facility] Please contact prior to discharge, , Does not apply, BID, Georgette Trevizo, [facility]  •  amLODIPine (NORVASC) tablet 5 mg, 5 mg, Oral, Daily, Fermin Hoyt PA, 5 mg at 03/09/23 1009  •  apixaban (ELIQUIS) tablet 5 mg, 5 mg, Oral, BID, Fermin Hoyt PA, 5 mg at 03/09/23 2125  •  busPIRone (BUSPAR) tablet 5 mg, 5 mg, Oral, Daily, Fermin Hoyt PA, 5 mg at 03/09/23 1008  •  Calcium Replacement - Initiate Nurse / BPA Driven Protocol, , Does not apply, PRN, Fermin Hoyt PA  •  carvedilol (COREG) tablet 3.125 mg, 3.125 mg, Oral, BID, Fermin Hoyt PA, 3.125 mg at 03/09/23 2125  •  colestipol (COLESTID) tablet 1 g *Patient Supplied*, 1 g, Oral, Daily, Herminia Leavitt, APRN, 1 g at 03/09/23 1502  •  [COMPLETED] dofetilide (TIKOSYN) capsule 250 mcg, 250 mcg, Oral, Q12H, 250 mcg at 03/08/23 1704 **FOLLOWED BY** [COMPLETED] dofetilide (TIKOSYN) capsule 250 mcg, 250 mcg, Oral, Q12H, 250 mcg at 03/09/23 0249 **FOLLOWED BY** [COMPLETED] dofetilide (TIKOSYN) capsule 250 mcg, 250 mcg, Oral, Q12H, 250 mcg at 03/09/23 1258 **FOLLOWED BY** [COMPLETED] dofetilide (TIKOSYN) capsule 250 mcg, 250 mcg, Oral, Q12H, 250 mcg at 03/09/23 2302 **FOLLOWED BY** dofetilide (TIKOSYN) capsule 250 mcg, 250 mcg, Oral,  "Q12H, Fermin Hoyt PA  •  losartan (COZAAR) tablet 100 mg, 100 mg, Oral, Daily, Fermin Hoyt PA, 100 mg at 03/09/23 1008  •  Magnesium Standard Dose Replacement - Initiate Nurse / BPA Driven Protocol, , Does not apply, PRN, Fermin Hoyt PA  •  pantoprazole (PROTONIX) EC tablet 40 mg, 40 mg, Oral, Q AM, Fermin Hoyt PA, 40 mg at 03/10/23 0614  •  Pharmacy Consult, , Does not apply, Once PRN, Fermin Hoyt PA  •  Phosphorus Replacement - Initiate Nurse / BPA Driven Protocol, , Does not apply, PRN, Fermin Hoyt PA  •  Potassium Replacement - Initiate Nurse / BPA Driven Protocol, , Does not apply, PRN, Fermin Hoyt PA  •  pramipexole (MIRAPEX) tablet 0.25 mg, 0.25 mg, Oral, Nightly, Fermin Hoyt PA, 0.25 mg at 03/09/23 2125  •  rosuvastatin (CRESTOR) tablet 5 mg, 5 mg, Oral, Nightly, Fermin Hoyt PA, 5 mg at 03/09/23 2125      Vital Sign Min/Max for last 24 hours  Temp  Min: 97.8 °F (36.6 °C)  Max: 98.5 °F (36.9 °C)   BP  Min: 122/73  Max: 150/88   Pulse  Min: 59  Max: 96   Resp  Min: 16  Max: 18   SpO2  Min: 94 %  Max: 95 %   No data recorded   No data recorded     Flowsheet Rows    Flowsheet Row First Filed Value   Admission Height 167.6 cm (66\") Documented at 03/08/2023 1327   Admission Weight 73.2 kg (161 lb 6.4 oz) Documented at 03/08/2023 1327          Physical Exam:     General Appearance:    Alert, cooperative, in no acute distress   Lungs:    Clear to auscultation bilaterally.  Respaire effort normal.    Heart:   Regular rhythm normal S1-S2.  There is an S4.   Chest Wall:    No abnormalities observed   Abdomen:     Normal bowel sounds, benign examination   Extremities:   Moves all extremities well, no edema, no cyanosis, no             redness   Pulses:   Pulses palpable and equal bilaterally   Skin:   No bleeding, bruising or rash        Results Review:       Results from last 7 days   Lab Units 03/10/23  0423 03/09/23  0334 03/08/23  1407   SODIUM " mmol/L 142 140 143   POTASSIUM mmol/L 4.2 4.0 4.2   CHLORIDE mmol/L 104 106 106   CO2 mmol/L 26.0 23.0 26.0   BUN mg/dL 26* 20 22   CREATININE mg/dL 0.95 0.74 0.92   GLUCOSE mg/dL 96 95 92                  Magnesium: 1.8 on 3/10/2023   Ionized Calcium: 1.27      Intake/Output Summary (Last 24 hours) at 3/10/2023 0720  Last data filed at 3/9/2023 2000  Gross per 24 hour   Intake 740 ml   Output --   Net 740 ml       I personally viewed and interpreted the patient's EKG/Telemetry data      EKG: Atrial paced, HR 62, QTc is 440 ms     Telemetry: A-paced, HR 59-96 bpm      Present on Admission:  • Atrial fibrillation (HCC)    Assessment & Plan   1) Atrial Fibrillation: Admitted for Tikosyn on 3/8/2023. Tikosyn initiated at 250mg BID 3/8/23 evening. In atrial paced rhythm this morning.  QTC is acceptable and will continue current dose of Tikosyn. EKG at 3 pm this afternoon.  2) Anticoagulation: Continue Eliquis  3)HTN : well controlled on home regimen, continue   4) SSS: with [facility]. Will clear counters today.       Plan for disposition: EKG at 3 pm. Continue to monitor QTc. Possibly home later today. Will need EKG as outpatient on Monday3/13/2023. Follow up with Dr. Chavez in 3 months with [facility] check.     Addendum: QTc at 3:00 EKG today measured at approximately 480 to 500 ms.  We will keep her overnight and decrease Tikosyn 125 mics p.o. twice daily.  Repeat EKG in a.m. tomorrow.  If QTc is at baseline (440 to 460 ms, then okay for discharge tomorrow.      Electronically signed by YOSHI Bernal, 03/10/23, 7:20 AM Chuck FIELDS .I, MD, personally performed the services described in this documentation as scribed by the above named individual in my presence, and it is both accurate and complete.  3/10/2023  15:29 EST

## 2023-03-11 VITALS
HEART RATE: 68 BPM | SYSTOLIC BLOOD PRESSURE: 146 MMHG | WEIGHT: 161.4 LBS | DIASTOLIC BLOOD PRESSURE: 93 MMHG | HEIGHT: 66 IN | TEMPERATURE: 97.9 F | OXYGEN SATURATION: 94 % | RESPIRATION RATE: 20 BRPM | BODY MASS INDEX: 25.94 KG/M2

## 2023-03-11 PROCEDURE — 93010 ELECTROCARDIOGRAM REPORT: CPT | Performed by: INTERNAL MEDICINE

## 2023-03-11 PROCEDURE — 99238 HOSP IP/OBS DSCHRG MGMT 30/<: CPT | Performed by: INTERNAL MEDICINE

## 2023-03-11 PROCEDURE — 93005 ELECTROCARDIOGRAM TRACING: CPT | Performed by: INTERNAL MEDICINE

## 2023-03-11 RX ORDER — DOFETILIDE 0.12 MG/1
125 CAPSULE ORAL EVERY 12 HOURS SCHEDULED
Qty: 60 CAPSULE | Refills: 3 | Status: SHIPPED | OUTPATIENT
Start: 2023-03-11

## 2023-03-11 RX ADMIN — CARVEDILOL 3.12 MG: 3.12 TABLET, FILM COATED ORAL at 08:11

## 2023-03-11 RX ADMIN — AMLODIPINE BESYLATE 5 MG: 5 TABLET ORAL at 08:11

## 2023-03-11 RX ADMIN — COLESTIPOL HYDROCHLORIDE 1 G: 1 TABLET ORAL at 08:17

## 2023-03-11 RX ADMIN — DOFETILIDE 125 MCG: 0.12 CAPSULE ORAL at 08:10

## 2023-03-11 RX ADMIN — PANTOPRAZOLE SODIUM 40 MG: 40 TABLET, DELAYED RELEASE ORAL at 06:41

## 2023-03-11 RX ADMIN — BUSPIRONE HYDROCHLORIDE 5 MG: 10 TABLET ORAL at 08:10

## 2023-03-11 RX ADMIN — LOSARTAN POTASSIUM 100 MG: 50 TABLET, FILM COATED ORAL at 08:10

## 2023-03-11 RX ADMIN — APIXABAN 5 MG: 5 TABLET, FILM COATED ORAL at 08:11

## 2023-03-11 NOTE — PROGRESS NOTES
[facility]  Progress Note     LOS: 3 days   Patient Care Team:  Sofia Oquendo APRN as PCP - General (Family Medicine)  Nicanor Vega III, MD as Cardiologist (Cardiology)  Fermin Jara PA as Physician Assistant (Cardiology)  Chuck Chavez MD as Consulting Physician (Cardiac Electrophysiology)    Chief Complaint:  Atrial Fibrillation/Tikosyn therapy.    Subjective   Feels very well, no side effects of Tikosyn, QT interval has improved.  Ambulating in the room without difficulty.  Wishes to go home.  Denies chest pain or shortness of breath.    Review of Systems:   Negative other than what is mentioned above.      Objective       Current Facility-Administered Medications:   •  ![facility]. Please contact prior to discharge, , Does not apply, BID, Georgette Trevizo, [facility]  •  amLODIPine (NORVASC) tablet 5 mg, 5 mg, Oral, Daily, Fermin Hoyt PA, 5 mg at 03/11/23 0811  •  apixaban (ELIQUIS) tablet 5 mg, 5 mg, Oral, BID, Fermin Hoyt PA, 5 mg at 03/11/23 0811  •  busPIRone (BUSPAR) tablet 5 mg, 5 mg, Oral, Daily, Fermin Hoyt PA, 5 mg at 03/11/23 0810  •  Calcium Replacement - Initiate Nurse / BPA Driven Protocol, , Does not apply, PRN, Fermin Hoyt PA  •  carvedilol (COREG) tablet 3.125 mg, 3.125 mg, Oral, BID, Fermin Hoyt PA, 3.125 mg at 03/11/23 0811  •  colestipol (COLESTID) tablet 1 g *Patient Supplied*, 1 g, Oral, Daily, Herminia Leavitt, APRN, 1 g at 03/11/23 0817  •  [COMPLETED] dofetilide (TIKOSYN) capsule 250 mcg, 250 mcg, Oral, Q12H, 250 mcg at 03/08/23 1704 **FOLLOWED BY** [COMPLETED] dofetilide (TIKOSYN) capsule 250 mcg, 250 mcg, Oral, Q12H, 250 mcg at 03/09/23 0249 **FOLLOWED BY** [COMPLETED] dofetilide (TIKOSYN) capsule 250 mcg, 250 mcg, Oral, Q12H, 250 mcg at 03/09/23 1258 **FOLLOWED BY** [COMPLETED] dofetilide (TIKOSYN) capsule 250 mcg, 250 mcg, Oral, Q12H, 250 mcg at 03/09/23 0578  "**FOLLOWED BY** dofetilide (TIKOSYN) capsule 125 mcg, 125 mcg, Oral, Q12H, Chuck Chavez MD, 125 mcg at 03/11/23 0810  •  losartan (COZAAR) tablet 100 mg, 100 mg, Oral, Daily, Fermin Hoyt PA, 100 mg at 03/11/23 0810  •  Magnesium Standard Dose Replacement - Initiate Nurse / BPA Driven Protocol, , Does not apply, PRN, Fermin Hoyt PA  •  pantoprazole (PROTONIX) EC tablet 40 mg, 40 mg, Oral, Q AM, Fermin Hoyt PA, 40 mg at 03/11/23 0641  •  Pharmacy Consult, , Does not apply, Once PRN, Fermin Hoyt PA  •  Phosphorus Replacement - Initiate Nurse / BPA Driven Protocol, , Does not apply, PRN, Fermin Hoyt PA  •  Potassium Replacement - Initiate Nurse / BPA Driven Protocol, , Does not apply, PRN, Fermin Hoyt PA  •  pramipexole (MIRAPEX) tablet 0.25 mg, 0.25 mg, Oral, Nightly, Fermin Hoyt PA, 0.25 mg at 03/10/23 2105  •  rosuvastatin (CRESTOR) tablet 5 mg, 5 mg, Oral, Nightly, Fermin Hoyt PA, 5 mg at 03/10/23 2105      Vital Sign Min/Max for last 24 hours  Temp  Min: 97.7 °F (36.5 °C)  Max: 98.6 °F (37 °C)   BP  Min: 132/77  Max: 153/83   Pulse  Min: 59  Max: 68   Resp  Min: 16  Max: 20   SpO2  Min: 91 %  Max: 98 %   Flow (L/min)  Min: 1  Max: 1   No data recorded     Flowsheet Rows    Flowsheet Row First Filed Value   Admission Height 167.6 cm (66\") Documented at 03/08/2023 1327   Admission Weight 73.2 kg (161 lb 6.4 oz) Documented at 03/08/2023 1327          Physical Exam:  General: No apparent distress.  Neck: no JVD.  Chest:No respiratory distress, breath sounds are normal. No wheezes,  rhonchi or rales.  Cardiovascular: Normal S1 and S2, no murmur, gallop or rub.    Extremities: No edema.     Results Review:       Results from last 7 days   Lab Units 03/10/23  0423 03/09/23  0334 03/08/23  1407   SODIUM mmol/L 142 140 143   POTASSIUM mmol/L 4.2 4.0 4.2   CHLORIDE mmol/L 104 106 106   CO2 mmol/L 26.0 23.0 26.0   BUN mg/dL 26* 20 22   CREATININE mg/dL 0.95 " 0.74 0.92   GLUCOSE mg/dL 96 95 92                  Magnesium: 1.8 on 3/10/2023   Ionized Calcium: 1.27      Intake/Output Summary (Last 24 hours) at 3/11/2023 0845  Last data filed at 3/10/2023 2000  Gross per 24 hour   Intake 660 ml   Output --   Net 660 ml       I personally viewed and interpreted the patient's EKG/Telemetry data      EKG: Atrial paced, , QTc 451    Assessment & Plan    Atrial fibrillation, admitted for Tikosyn initiation, initial dose was 250 which was subsequently lowered due to increased QTc.  QTc has now normalized.    Sick sinus syndrome, status post [employer] pacemaker implant normal pacemaker function.    Hypertension, well controlled on home medications    Patient is stable, will discharge to home on Tikosyn 125 mcg twice daily and usual home medications.  She will follow-up with Dr. Chavez as scheduled.    Layo Hassan MD, FACC, [facility]

## 2023-03-13 ENCOUNTER — TELEPHONE (OUTPATIENT)
Dept: CARDIOLOGY | Facility: CLINIC | Age: 78
End: 2023-03-13
Payer: MEDICARE

## 2023-03-13 ENCOUNTER — NURSE TRIAGE (OUTPATIENT)
Dept: CALL CENTER | Facility: HOSPITAL | Age: 78
End: 2023-03-13
Payer: MEDICARE

## 2023-03-13 NOTE — TELEPHONE ENCOUNTER
Patient called and left a message, I returned call, no answer. I left a message for a return call.

## 2023-03-13 NOTE — TELEPHONE ENCOUNTER
Caller reported started on dofetilide for a-fib, stated hasn't been able to sleep, has no appetite, fatigued since starting. Instructed to call Dr. Chavez. Asked if should stop taking medication, instructed to call office and speak with provider before stopping medication  Reason for Disposition  • [1] Caller has URGENT medicine question about med that PCP or specialist prescribed AND [2] triager unable to answer question    Additional Information  • Negative: [1] Intentional drug overdose AND [2] suicidal thoughts or ideas  • Negative: Drug overdose and triager unable to answer question  • Negative: Caller requesting information unrelated to medicine  • Negative: Caller requesting information about COVID-19 Vaccine  • Negative: Caller requesting information about Emergency Contraception  • Negative: Caller requesting information about Combined Birth Control Pills  • Negative: Caller requesting information about Progestin Birth Control Pills  • Negative: Caller requesting information about Post-Op pain or medicines  • Negative: Caller requesting a prescription antibiotic (such as Penicillin) for Strep throat and has a positive culture result  • Negative: Caller requesting a prescription anti-viral med (such as Tamiflu) and has influenza (flu)  symptoms  • Negative: Immunization reaction suspected  • Negative: Rash while taking a medicine or within 3 days of stopping it  • Negative: [1] Asthma and [2] having symptoms of asthma (cough, wheezing, etc.)  • Negative: [1] Symptom of illness (e.g., headache, abdominal pain, earache, vomiting) AND [2] more than mild  • Negative: Breastfeeding questions about mother's medicines and diet  • Negative: MORE THAN A DOUBLE DOSE of a prescription or over-the-counter (OTC) drug  • Negative: [1] DOUBLE DOSE (an extra dose or lesser amount) of prescription drug AND [2] any symptoms (e.g., dizziness, nausea, pain, sleepiness)  • Negative: [1] DOUBLE DOSE (an extra dose or lesser  "amount) of over-the-counter (OTC) drug AND [2] any symptoms (e.g., dizziness, nausea, pain, sleepiness)  • Negative: Took another person's prescription drug  • Negative: [1] DOUBLE DOSE (an extra dose or lesser amount) of prescription drug AND [2] NO symptoms (Exception: a double dose of antibiotics)  • Negative: Diabetes drug error or overdose (e.g., took wrong type of insulin or took extra dose)  • Negative: [1] Prescription refill request for ESSENTIAL medicine (i.e., likelihood of harm to patient if not taken) AND [2] triager unable to refill per department policy  • Negative: [1] Prescription not at pharmacy AND [2] was prescribed by PCP recently (Exception: triager has access to EMR and prescription is recorded there. Go to Home Care and confirm for pharmacy.)  • Negative: [1] Pharmacy calling with prescription question AND [2] triager unable to answer question    Answer Assessment - Initial Assessment Questions  1. NAME of MEDICATION: \"What medicine are you calling about?\"      dofetilide  2. QUESTION: \"What is your question?\" (e.g., medication refill, side effect)      Wanting to know if should stop taking  3. PRESCRIBING HCP: \"Who prescribed it?\" Reason: if prescribed by specialist, call should be referred to that group.      Dr. Chavez  4. SYMPTOMS: \"Do you have any symptoms?\"      Reported not sleeping, no appetite, fatigue  5. SEVERITY: If symptoms are present, ask \"Are they mild, moderate or severe?\"      Mod-severe  6. PREGNANCY:  \"Is there any chance that you are pregnant?\" \"When was your last menstrual period?\"      n/a    Protocols used: MEDICATION QUESTION CALL-ADULT-AH    "

## 2023-03-15 LAB
QT INTERVAL: 472 MS
QTC INTERVAL: 475 MS

## 2023-03-17 LAB
QT INTERVAL: 478 MS
QT INTERVAL: 490 MS
QT INTERVAL: 492 MS
QT INTERVAL: 492 MS
QTC INTERVAL: 478 MS
QTC INTERVAL: 492 MS
QTC INTERVAL: 501 MS
QTC INTERVAL: 503 MS

## 2023-03-28 NOTE — DISCHARGE SUMMARY
[facility]iology [facility]  DISCHARGE SUMMARY    Date of Discharge:  3/11/2023    Discharge Diagnosis: AFIB     Presenting Problem/History of Present Illness  Atrial fibrillation (HCC) [I48.91]    PROBLEM LIST:   1. Sick Sinus Syndrome  a. Loop recorder interrogation 7/21/2022 demonstrating episodes of sinus bradycardia during waking hours, consistent with symptoms of fatigue.   b. Placement of [employer] pacemaker dual chamber.  2. Paroxysmal atrial fibrillation  a. CHADSVasc = 6  on Eliquis  b. Diagnosed on loop recorder interrogation April 2021  c. Previous CVA 2020   d. Tikosyn initiation   3. Hypertension  4. Hyperlipidemia  5. CVA 10/2020  a. MRI evidence of multiple small infarcts in the left splenium of the corpus callosum and medial left occipital lobe  b. EEG unremarkable  c. VIDAL 10/16/2020: EF 61 to 65%, no evidence of left atrial appendage thrombus, saline test negative for right to left atrial level shunt  d. Loop recorder implant 12/17/2020-later found to have diagnosis of atrial fibrillation April 2021  6. History of breast cancer status post right mastectomy on Arimidex  7. Anxiety  8. Depression    Hospital Course  The patient was admitted to the telemetry floor for careful monitoring. On admission, the patient's  labs were reviewed, pharmacy was consulted and the patient's dose was calculated. Tikosyn was initiated and the QTc interval was followed closely.  After 4 doses the patient converted to NSR   During admission the patient and family received education about Tikosyn and potential medication interactions and QT prolonging meds. With regards to anticoagulation pt has done well continuing on Eliquis. At the time of discharge the patient is stable and appropriate for discharge to home.       Procedures Performed         Consults:   Consults     No orders found from 2/7/2023 to 3/9/2023.              Condition on Discharge:  Stable    Discharge Disposition:  HOME    Discharge Diet: Cardiac heart healthy diet    Activity at Discharge: As tolerated    Follow-up Appointments  Future Appointments   Date Time Provider Department Center   5/18/2023  3:45 PM Berna Araiza MD [facility]   6/13/2023 11:00 AM Fermin Jara PA [facility]   7/5/2023 10:30 AM Nicanor Vega III, MD [facility]   3/13/2024  3:15 PM Chuck Chavez MD [facility]     Additional Instructions for the Follow-ups that You Need to Schedule     Discharge Follow-up with Specialty: Dr. Chavez; 3 Months   As directed      Specialty: Dr. Chavez    Follow Up: 3 Months    Follow Up Details: s/p tikosyn, with [facility] PM check               Discharge Medications     Discharge Medications      New Medications      Instructions Start Date   dofetilide 125 MCG capsule  Commonly known as: TIKOSYN   125 mcg, Oral, Every 12 Hours Scheduled         Continue These Medications      Instructions Start Date   amLODIPine 5 MG tablet  Commonly known as: NORVASC   5 mg, Oral, Daily      apixaban 5 MG tablet tablet  Commonly known as: Eliquis   5 mg, Oral, 2 Times Daily, First dose Thursday 9/29/2022      busPIRone 5 MG tablet  Commonly known as: BUSPAR   5 mg, Oral, 2 times daily      carvedilol 3.125 MG tablet  Commonly known as: COREG   3.125 mg, Oral, 2 Times Daily      cetirizine 10 MG tablet  Commonly known as: zyrTEC   10 mg, Oral, Daily      colestipol 1 g tablet  Commonly known as: Colestid   1 g, Oral, Daily      COQ10 PO   1 tablet, Oral, Daily      esomeprazole 40 MG capsule  Commonly known as: nexIUM   TAKE 1 CAPSULE BY MOUTH IN THE MORNING BEFORE BREAKFAST      losartan 100 MG tablet  Commonly known as: COZAAR   100 mg, Oral, Daily      Magnesium 250 MG tablet   Oral, Daily      multivitamin (eye vitamin) tablet tablet  Generic drug: multivitamin with minerals   1 tablet, Oral, Daily      multivitamin with minerals tablet tablet   1 tablet, Oral, Daily      pramipexole 0.125 MG  tablet  Commonly known as: MIRAPEX   0.25 mg, Oral, Nightly      rosuvastatin 5 MG tablet  Commonly known as: CRESTOR   TAKE 1 TABLET EVERY NIGHT           Electronically signed by YOSHI Bernal, 03/28/23, 11:47 AM EDT.

## 2023-04-01 PROCEDURE — 93296 REM INTERROG EVL PM/IDS: CPT | Performed by: INTERNAL MEDICINE

## 2023-04-01 PROCEDURE — 93294 REM INTERROG EVL PM/LDLS PM: CPT | Performed by: INTERNAL MEDICINE

## 2023-05-04 NOTE — H&P
"Cardiology H&P     Yumi Crooks  1945  [phone]  There is no work phone number on file.    09/27/22    DATE OF ADMISSION: 9/27/2022  [facility]    Azra, Sofia HARRISON, APRN  [address] / [facility] [id number]  Referring Provider: Chuck Chavez MD     CC: sick sinus syndrome    Problem List:  1. Sick Sinus Syndrome  a. Loop recorder interrogation 7/21/2022 demonstrating episodes of sinus bradycardia during waking hours, consistent with symptoms of fatigue.   2. Paroxysmal atrial fibrillation  a. CHADSVasc = 6  on Eliquis  b. Diagnosed on loop recorder interrogation April 2021  c. Previous CVA 2020   3. Hypertension  4. Hyperlipidemia  5. CVA 10/2020  a. MRI evidence of multiple small infarcts in the left splenium of the corpus callosum and medial left occipital lobe  b. EEG unremarkable  c. VIDAL 10/16/2020: EF 61 to 65%, no evidence of left atrial appendage thrombus, saline test negative for right to left atrial level shunt  d. Loop recorder implant 12/17/2020-later found to have diagnosis of atrial fibrillation April 2021  6. History of breast cancer status post right mastectomy on Arimidex  7. Anxiety  8. Depression    History of Present Illness:   Yumi Crooks is a 77 year old female with above noted PMHx presenting today for LOOP recorder removal and dual chamber pacemaker placement. She had a loop recorder implanted December 2020, which she was found to have atrial fibrillation in April 2021 and was started on Eliquis as well as coreg. Recent interrogation of her loop recorder in July showed episodes of bradycardia in the 40s and intermittent sinus pauses up to 3 seconds during sleep. She has been feeling fatigued, sob and dizzy. HR at home have been running 45-50s. BP stable at home. Her last dose of eliquis was Saturday 9/24/22.       Allergies   Allergen Reactions   • Lortab [Hydrocodone-Acetaminophen] Other (See Comments)     \"hyperventilate\"   • Lipitor [Atorvastatin] " "Myalgia   • Penicillins Other (See Comments)     Had too much as a kid   • Sulfa Antibiotics Other (See Comments)      \"too much as a kid...and, it ruined my teeth\"       Prior to Admission Medications     Prescriptions Last Dose Informant Patient Reported? Taking?    amLODIPine (NORVASC) 10 MG tablet   No No    Take 1 tablet by mouth Every Morning.    anastrozole (ARIMIDEX) 1 MG tablet   Yes No    Take 1 mg by mouth Daily.    apixaban (Eliquis) 5 MG tablet tablet   No No    Take 1 tablet by mouth 2 (Two) Times a Day.    busPIRone (BUSPAR) 5 MG tablet   Yes No    Take 5 mg by mouth 2 (two) times a day.    carvedilol (COREG) 3.125 MG tablet   Yes No    Take 3.125 mg by mouth 2 (Two) Times a Day With Meals.    clopidogrel (PLAVIX) 75 MG tablet   No No    Take 1 tablet by mouth Daily.    Coenzyme Q10 (COQ10 PO)   Yes No    Take 1 tablet by mouth Daily.    esomeprazole (nexIUM) 40 MG capsule   No No    TAKE 1 CAPSULE BY MOUTH IN THE MORNING BEFORE BREAKFAST    ibuprofen (ADVIL,MOTRIN) 200 MG tablet   Yes No    Take 200 mg by mouth 2 (Two) Times a Day.    losartan (COZAAR) 100 MG tablet   No No    Take 0.5 tablets by mouth every night at bedtime.    Patient taking differently:  Take 100 mg by mouth every night at bedtime.    Magnesium 250 MG tablet   Yes No    Take  by mouth Daily.    MELATONIN PO   Yes No    Take 20 mg by mouth Every Night.    Multiple Vitamins-Minerals (multivitamin, eye vitamin,) tablet tablet   Yes No    Take 1 tablet by mouth Daily.    multivitamin with minerals tablet tablet   Yes No    Take 1 tablet by mouth Daily.    pramipexole (MIRAPEX) 0.125 MG tablet   Yes No    Take 0.25 mg by mouth Every Night.    rosuvastatin (CRESTOR) 5 MG tablet   No No    TAKE 1 TABLET EVERY NIGHT            Current Facility-Administered Medications:   •  acetaminophen (TYLENOL) tablet 650 mg, 650 mg, Oral, Q4H PRN, Beverly Monsivais APRN  •  ceFAZolin in dextrose (ANCEF) IVPB solution 2 g, 2 g, Intravenous, Once, " Beverly Monsivais APRN  •  nitroglycerin (NITROSTAT) SL tablet 0.4 mg, 0.4 mg, Sublingual, Q5 Min PRN, Beverly Monsivais APRN  •  sodium chloride 0.9 % flush 10 mL, 10 mL, Intravenous, PRN, Beverly Monsivais APRN  •  sodium chloride 0.9 % flush 3 mL, 3 mL, Intravenous, Q12H, Beverly Monsivais APRN  •  sodium chloride 0.9 % infusion, 1 mL/kg/hr (Order-Specific), Intravenous, Continuous, Beverly Monsivais APRN    Social History     Socioeconomic History   • Marital status:    Tobacco Use   • Smoking status: Never Smoker   • Smokeless tobacco: Never Used   Vaping Use   • Vaping Use: Never used   Substance and Sexual Activity   • Alcohol use: Not Currently   • Drug use: Never   • Sexual activity: Defer       Family History   Problem Relation Age of Onset   • Breast cancer Neg Hx    • Ovarian cancer Neg Hx        REVIEW OF SYSTEMS:   CONSTITUTIONAL:         No weight loss, fever, chills, +weakness, +fatigue.   HEENT:                            No visual loss, blurred vision, double vision, yellow sclerae.                                             No hearing loss, congestion, sore throat.   SKIN:                                No rashes, urticaria, ulcers, sores.     RESPIRATORY:               +shortness of breath, -hemoptysis, cough, sputum.   GI:                                     No anorexia, nausea, vomiting, diarrhea. No abdominal pain, melena.   :                                   No burning on urination, hematuria or increased frequency.  ENDOCRINE:                   No diaphoresis, cold or heat intolerance. No polyuria or polydipsia.   NEURO:                            No headache, +dizziness, -syncope, paralysis, ataxia, or parasthesias.                                            No change in bowel or bladder control. No history of CVA/TIA  MUSCULOSKELETAL:    No muscle, back pain, joint pain or stiffness.   HEMATOLOGY:               No anemia, bleeding, bruising. No history  "of DVT/PE.  PSYCH:                            No history of depression, anxiety    Vitals:    09/27/22 1401 09/27/22 1402 09/27/22 1404 09/27/22 1415   BP:    131/75   BP Location:    Left arm   Patient Position:    Lying   Pulse:   54 51   Resp:   18    Temp:   98.3 °F (36.8 °C)    TempSrc:   Temporal    SpO2: 98% 98% 99% 96%   Weight:   72.4 kg (159 lb 9.8 oz)    Height:   167.6 cm (66\")          Vital Sign Min/Max for last 24 hours  Temp  Min: 98.3 °F (36.8 °C)  Max: 98.3 °F (36.8 °C)   BP  Min: 131/75  Max: 160/82   Pulse  Min: 51  Max: 54   Resp  Min: 18  Max: 18   SpO2  Min: 96 %  Max: 99 %   No data recorded    No intake or output data in the 24 hours ending 09/27/22 1456          Physical Exam:  GEN: Well nourished, Well- developed  No acute distress  HEENT: Normocephalic, Atraumatic, PERRLA, moist mucous membranes  NECK: supple, NO JVD, no thyromegaly, no lymphadenopathy  CARDIAC: S1S2  Regular, bradycardic, no murmur, gallop, rub  LUNGS: Clear to ausculation, normal respiratory effort  ABDOMEN: Soft, nontender, normal bowel sounds  EXTREMITIES:No gross deformities,  No clubbing, cyanosis, or edema  SKIN: Warm, dry  NEURO: No focal deficits  PSYCHIATRIC: Normal affect and mood      I personally viewed and interpreted the patient's EKG/Telemetry/lab data    Data:   Results from last 7 days   Lab Units 09/27/22  1403   WBC 10*3/mm3 7.06   HEMOGLOBIN g/dL 12.6   HEMATOCRIT % 37.1   PLATELETS 10*3/mm3 213     Results from last 7 days   Lab Units 09/27/22  1403   SODIUM mmol/L 141   POTASSIUM mmol/L 4.0   CHLORIDE mmol/L 107   CO2 mmol/L 22.0   BUN mg/dL 19   CREATININE mg/dL 0.80   GLUCOSE mg/dL 96                                  No intake or output data in the 24 hours ending 09/27/22 1456  Lab Results   Component Value Date    WBC 7.06 09/27/2022    HGB 12.6 09/27/2022    HCT 37.1 09/27/2022    MCV 93.2 09/27/2022     09/27/2022     Lab Results   Component Value Date    GLUCOSE 96 09/27/2022    BUN 19 " 09/27/2022    CREATININE 0.80 09/27/2022    EGFRIFNONA 62 12/17/2020    EGFRIFAFRI 57 (L) 11/13/2020    BCR 23.8 09/27/2022    K 4.0 09/27/2022    CO2 22.0 09/27/2022    CALCIUM 9.6 09/27/2022    PROTENTOTREF 6.9 11/13/2020    ALBUMIN 4.80 11/13/2020    LABIL2 2.3 11/13/2020    AST 24 11/13/2020    ALT 30 11/13/2020       Telemetry:  Sinus bradycardia, HR 55      Assessment and Plan:     1. Symptomatic SSS  - Documented bradycardia during waking hours on Loop recorder interrogation 7/21/2022 demonstrating episodes of sinus bradycardia, consistent with symptoms of fatigue, sob, dizziness.   -plan for DC PPM implant today. The risks, benefits, and alternatives of the procedure have been reviewed and the patient wishes to proceed.   -Last dose of Eliquis 9/24/22     2. PAF   -CHADSVasc = 6  on Eliquis (Held x 2 days prior to PM implant)   -Diagnosed on loop recorder interrogation April 2021.   -Continue coreg.      3. HTN  -well controlled at home, continue present medications      4. HLD   -Continue statin     Electronically signed by DIONY Jean, 09/27/22, 2:30 PM EDT.                 room air

## 2023-05-18 ENCOUNTER — OFFICE VISIT (OUTPATIENT)
Dept: NEUROLOGY | Facility: CLINIC | Age: 78
End: 2023-05-18
Payer: MEDICARE

## 2023-05-18 VITALS
DIASTOLIC BLOOD PRESSURE: 80 MMHG | BODY MASS INDEX: 26.84 KG/M2 | HEIGHT: 66 IN | HEART RATE: 66 BPM | SYSTOLIC BLOOD PRESSURE: 132 MMHG | WEIGHT: 167 LBS | OXYGEN SATURATION: 99 %

## 2023-05-18 DIAGNOSIS — I69.30 SEQUELAE, POST-STROKE: Primary | ICD-10-CM

## 2023-05-18 PROCEDURE — 1160F RVW MEDS BY RX/DR IN RCRD: CPT | Performed by: PSYCHIATRY & NEUROLOGY

## 2023-05-18 PROCEDURE — 3075F SYST BP GE 130 - 139MM HG: CPT | Performed by: PSYCHIATRY & NEUROLOGY

## 2023-05-18 PROCEDURE — 1159F MED LIST DOCD IN RCRD: CPT | Performed by: PSYCHIATRY & NEUROLOGY

## 2023-05-18 PROCEDURE — 99214 OFFICE O/P EST MOD 30 MIN: CPT | Performed by: PSYCHIATRY & NEUROLOGY

## 2023-05-18 PROCEDURE — 3079F DIAST BP 80-89 MM HG: CPT | Performed by: PSYCHIATRY & NEUROLOGY

## 2023-05-18 NOTE — PROGRESS NOTES
Subjective:    CC: Yumi Crooks is seen today  for stroke    HPI:  Current visit- patient denies any strokelike symptoms since she last saw me.  She had a pacemaker placed for bradycardia.  Also went to the hospital in March with persistent atrial fibrillation for which she was started on Tikosyn.  Continues to take Plavix in addition to Eliquis 5 mg twice daily and Crestor 5 mg daily.  Her last lipid panel was as follows-, , LDL 99, HDL 48.  A1c was 5.7.  Her last CK was 227.  She states that she is having a lot of leg cramps from her waist downwards all the way to her feet that prevent her from sleeping well at night.     Last visit-patient denies any new strokelike symptoms however her last loop recorder tracings showed several episodes of bradycardia with the longest being 42 seconds with a heart rate dropping down to 40 bpm.  She has an upcoming appointment with cardiology to see if she would be a candidate for pacemaker.  Reports to being tired all day long.  She also sustained a major fall in May when she slipped in the bathroom at night and had a big hematoma across her left hip.  Since then she has had right shoulder pain and left hip pain.  Has had several x-rays that did not show any evidence of fracture.  She continues to take Plavix along with Eliquis and Crestor 5 mg daily.  Her blood pressure is running within normal limits.  Addendum- last lipid panel done on 11/29 was as follows-, , , HDL 47. Glucose was 99, normal sodium, normal LFTs and GFR of 57.    Last visit-patient denies having any new strokelike symptoms.  She continues to have slightly blurred vision in her right eye.  Her loop recorder showed an episode of atrial fibrillation.  After that her cardiologist Dr. Vega switched her from aspirin to Eliquis 5 mg twice a day that she started taking in addition to Plavix.  Is also taking Crestor 5 mg daily.  Her myalgias have improved but she still has some  leg cramps on walking.  She feels that she has a short-term memory problems however as per her daughter she has too much on her mind as she also takes care of her .      Last visit-patient states she has not had any new strokelike symptoms since her last visit.  Her previous symptoms of blurred vision and right arm numbness have now subsided.  She continues to take dual antiplatelets along with Crestor 10 mg.  She tried cutting down her pill to 5 mg but was unable to.  Is tolerating it better now.  Her last lipid panel was as follows-, , LDL 78, HDL 45.  CK was 210.  B12 and vitamin D levels were normal.  She also had a Holter monitor that showed PVCs and PACs but no atrial fibrillation.  Has a loop recorder in place now.    Initial [id number]-old female accompanied by her daughter with a history of hypertension, hyperlipidemia, breast cancer status post mastectomy, anxiety, depression, arthritis presents as a hospital follow-up for stroke.  As per patient she had symptoms of blurred vision along with right arm numbness and a headache on 10/14.  She was initially taken to an outside hospital and subsequently transferred here.  Her initial blood pressure was 195/104..  Initial CT scan of the head showed arachnoid cyst in the left temporal lobe but no any acute abnormalities.  She had a CT angiogram of the head and neck that showed right distal vertebral occlusion as well as left P1/P2 occlusion with no significant intracarotid stenosis.  MRI brain showed acute infarcts in the left medial occipital lobe as well as an splenium of corpus callosum.  EEG was normal. VIDAL showed a normal EF with left ventricular concentric hypertrophy but normal left atrial appendage thrombus or PFO.  She was started on dual antiplatelets as well as Lipitor 80 mg daily.  Her lipid panel was as follows-, , , HDL 30.  A1c was 5.7.  Patient could not tolerate Lipitor and she was recently switched to  Crestor 10 mg daily by her cardiologist but is unable to tolerate that as well as she is having pain in her legs.  She also feels tired at all times.  Had a Holter monitor after discharge the results of which are still pending.  Her systolic blood pressure at home is still running high in the 140s and 150s.  Of note-I personally reviewed her MRI brain and Dr. Jim's's notes.    The following portions of the patient's history were reviewed today and updated as of 11/12/2020  : allergies, current medications, past family history, past medical history, past social history, past surgical history and problem list  These document will be scanned to patient's chart.      Current Outpatient Medications:   •  amLODIPine (NORVASC) 5 MG tablet, Take 1 tablet by mouth Daily., Disp: 30 tablet, Rfl: 6  •  apixaban (Eliquis) 5 MG tablet tablet, Take 1 tablet by mouth 2 (Two) Times a Day. First dose Thursday 9/29/2022, Disp: 60 tablet, Rfl: 2  •  busPIRone (BUSPAR) 5 MG tablet, Take 1 tablet by mouth 2 (two) times a day., Disp: , Rfl:   •  carvedilol (COREG) 3.125 MG tablet, Take 1 tablet by mouth 2 (Two) Times a Day., Disp: 180 tablet, Rfl: 1  •  cetirizine (zyrTEC) 10 MG tablet, Take 1 tablet by mouth Daily., Disp: , Rfl:   •  Coenzyme Q10 (COQ10 PO), Take 1 tablet by mouth Daily., Disp: , Rfl:   •  colestipol (Colestid) 1 g tablet, Take 1 tablet by mouth Daily., Disp: 30 tablet, Rfl: 6  •  dofetilide (TIKOSYN) 125 MCG capsule, Take 1 capsule by mouth Every 12 (Twelve) Hours., Disp: 60 capsule, Rfl: 3  •  esomeprazole (nexIUM) 40 MG capsule, TAKE 1 CAPSULE BY MOUTH IN THE MORNING BEFORE BREAKFAST, Disp: 30 capsule, Rfl: 1  •  losartan (COZAAR) 100 MG tablet, Take 1 tablet by mouth Daily., Disp: , Rfl:   •  Magnesium 250 MG tablet, Take  by mouth Daily., Disp: , Rfl:   •  Multiple Vitamins-Minerals (multivitamin, eye vitamin,) tablet tablet, Take 1 tablet by mouth Daily., Disp: , Rfl:   •  multivitamin with minerals tablet  "tablet, Take 1 tablet by mouth Daily., Disp: , Rfl:   •  pramipexole (MIRAPEX) 0.125 MG tablet, Take 2 tablets by mouth Every Night., Disp: , Rfl:    Past Medical History:   Diagnosis Date   • Asthma ???   • Atrial fibrillation Don't know   • Breast cancer    • Coronary artery disease Don't  know   • Dyslipidemia    • Hyperlipidemia Yrs ago   • Hypertension       Past Surgical History:   Procedure Laterality Date   • BREAST BIOPSY Right     2015   • CARDIAC ELECTROPHYSIOLOGY PROCEDURE N/A 12/17/2020    Procedure: Loop insertion;  Surgeon: Nicanor Vega III, MD;  Location:  [facility];  Service: Cardiovascular;  Laterality: N/A;   • CARDIAC ELECTROPHYSIOLOGY PROCEDURE N/A 09/27/2022    Procedure: Pacemaker DC new and loop removal. Hold Eliquis 2 days prior.;  Surgeon: Chuck Chavez MD;  Location: [facility] EP INVASIVE LOCATION;  Service: Cardiology;  Laterality: N/A;   • CHOLECYSTECTOMY     • INSERT / REPLACE / REMOVE PACEMAKER  10 2022   • PARATHYROIDECTOMY     • REPLACEMENT TOTAL KNEE Bilateral       Family History   Problem Relation Age of Onset   • Breast cancer Neg Hx    • Ovarian cancer Neg Hx       Social History     Socioeconomic History   • Marital status:    Tobacco Use   • Smoking status: Never     Passive exposure: Never   • Smokeless tobacco: Never   Vaping Use   • Vaping Use: Never used   Substance and Sexual Activity   • Alcohol use: Never   • Drug use: Never   • Sexual activity: Not Currently     Partners: Male     Birth control/protection: Surgical     Review of Systems   All other systems reviewed and are negative.      Objective:    /80   Pulse 66   Ht 167.6 cm (66\")   Wt 75.8 kg (167 lb)   SpO2 99%   BMI 26.95 kg/m²     Neurology Exam:    General apperance: NAD.     Mental status: Alert, awake and oriented to time place and person.  Could tell me the month, year, her daughter's date of birth and her ZIP Code    Recent and Remote memory: Intact.    Attention " span and Concentration: Normal.     Language and Speech: Intact- No dysarthria.    Fluency, Naming , Repitition and Comprehension:  Intact    Cranial Nerves:   CN II: Visual fields are full. Intact. Fundi - Normal, No papillederma, Pupils - CRISTAL  CN III, IV and VI: Extraocular movements are intact. Normal saccades.   CN V: Facial sensation is intact.   CN VII: Muscles of facial expression reveal no asymmetry. Intact.   CN VIII: Hearing is intact. Whispered voice intact.   CN IX and X: Palate elevates symmetrically. Intact  CN XI: Shoulder shrug is intact.   CN XII: Tongue is midline without evidence of atrophy or fasciculation.     Ophthalmoscopic exam of optic disc- deferred    Motor:  Right UE muscle strength 5/5. Normal tone.     Left UE muscle strength 5/5. Normal tone.      Right LE muscle strength5/5. Normal tone.     Left LE muscle strength 5/5. Normal tone.      Sensory: Normal light touch, vibration and pinprick sensation bilaterally.    DTRs: 2+ bilaterally in upper and lower extremities.    Babinski: Negative bilaterally.    Co-ordination: Normal finger-to-nose, heel to shin B/L.    Rhomberg: Negative.    Gait: Mildly antalgic gait due to left hip pain    Cardiovascular: Regular rate and rhythm without murmur, gallop or rub.    Assessment and Plan:  1. Cerebrovascular accident (CVA), unspecified mechanism (CMS/HCC)  Patient had acute infarcts in the left PCA territory along with left P1/P2 occlusion and distal right vertebral occlusion.  Holter monitor showed PACs and PVCs but no A. fib.  Her loop recorder showed paroxysmal atrial fibrillation.  Last loop recorder showed bradycardia.  She now has a pacemaker.  Has also been started on Tikosyn for A-fib  She is now on Plavix 75 mg in addition to Eliquis 5 mg twice a day  I have told her to stop Crestor as she is having severe myalgias.  Last LDL was 99  Goal blood pressure less than 130/90.  Blood pressure is well controlled now  I have also counseled her  to carry out dietary modifications for her prediabetes.  A1c of 5.7  She should also keep herself well-hydrated.  If she still has difficulty sleeping at night after stopping Crestor she can take low doses of melatonin           Return in about 6 months (around 11/18/2023).     I spent 25 minutes with the patient out of the 20 minutes was spent face-to-face.  Berna Araiza MD

## 2023-09-18 RX ORDER — DOFETILIDE 0.12 MG/1
125 CAPSULE ORAL EVERY 12 HOURS
Qty: 60 CAPSULE | Refills: 0 | Status: SHIPPED | OUTPATIENT
Start: 2023-09-18

## 2023-09-30 PROCEDURE — 93294 REM INTERROG EVL PM/LDLS PM: CPT | Performed by: INTERNAL MEDICINE

## 2023-09-30 PROCEDURE — 93296 REM INTERROG EVL PM/IDS: CPT | Performed by: INTERNAL MEDICINE

## 2023-10-20 RX ORDER — DOFETILIDE 0.12 MG/1
125 CAPSULE ORAL EVERY 12 HOURS
Qty: 90 CAPSULE | Refills: 2 | Status: SHIPPED | OUTPATIENT
Start: 2023-10-20

## 2023-11-20 ENCOUNTER — OFFICE VISIT (OUTPATIENT)
Dept: NEUROLOGY | Facility: CLINIC | Age: 78
End: 2023-11-20
Payer: MEDICARE

## 2023-11-20 VITALS
WEIGHT: 173 LBS | BODY MASS INDEX: 27.8 KG/M2 | HEART RATE: 62 BPM | SYSTOLIC BLOOD PRESSURE: 122 MMHG | OXYGEN SATURATION: 97 % | DIASTOLIC BLOOD PRESSURE: 84 MMHG | HEIGHT: 66 IN

## 2023-11-20 DIAGNOSIS — I69.30 SEQUELAE, POST-STROKE: Primary | ICD-10-CM

## 2023-11-20 DIAGNOSIS — R73.03 PREDIABETES: ICD-10-CM

## 2023-11-20 PROCEDURE — 3074F SYST BP LT 130 MM HG: CPT | Performed by: PSYCHIATRY & NEUROLOGY

## 2023-11-20 PROCEDURE — 1160F RVW MEDS BY RX/DR IN RCRD: CPT | Performed by: PSYCHIATRY & NEUROLOGY

## 2023-11-20 PROCEDURE — 99213 OFFICE O/P EST LOW 20 MIN: CPT | Performed by: PSYCHIATRY & NEUROLOGY

## 2023-11-20 PROCEDURE — 3079F DIAST BP 80-89 MM HG: CPT | Performed by: PSYCHIATRY & NEUROLOGY

## 2023-11-20 PROCEDURE — 1159F MED LIST DOCD IN RCRD: CPT | Performed by: PSYCHIATRY & NEUROLOGY

## 2023-11-20 RX ORDER — AMLODIPINE BESYLATE 10 MG/1
TABLET ORAL
COMMUNITY
Start: 2023-09-05

## 2023-11-20 NOTE — PROGRESS NOTES
Subjective:    CC: Yumi Crooks is seen today  for stroke    HPI:  Current visit-patient denies any strokelike symptoms.  Her pacemaker did show more episodes of A-fib in May.  She continues to take Eliquis and Tikosyn.  Has stopped Plavix now.  Also stopped Crestor which did help with her leg cramps however she still has some muscle spasms at times.  Reports to drinking very little water during the day.  Her PCP had repeated a lipid panel which was slightly elevated (we do not have the results).    Last visit-patient denies any strokelike symptoms since she last saw me.  She had a pacemaker placed for bradycardia.  Also went to the hospital in March with persistent atrial fibrillation for which she was started on Tikosyn.  Continues to take Plavix in addition to Eliquis 5 mg twice daily and Crestor 5 mg daily.  Her last lipid panel was as follows-, , LDL 99, HDL 48.  A1c was 5.7.  Her last CK was 227.  She states that she is having a lot of leg cramps from her waist downwards all the way to her feet that prevent her from sleeping well at night.     Last visit-patient denies any new strokelike symptoms however her last loop recorder tracings showed several episodes of bradycardia with the longest being 42 seconds with a heart rate dropping down to 40 bpm.  She has an upcoming appointment with cardiology to see if she would be a candidate for pacemaker.  Reports to being tired all day long.  She also sustained a major fall in May when she slipped in the bathroom at night and had a big hematoma across her left hip.  Since then she has had right shoulder pain and left hip pain.  Has had several x-rays that did not show any evidence of fracture.  She continues to take Plavix along with Eliquis and Crestor 5 mg daily.  Her blood pressure is running within normal limits.  Addendum- last lipid panel done on 11/29 was as follows-, , , HDL 47. Glucose was 99, normal sodium, normal LFTs  and GFR of 57.    Last visit-patient denies having any new strokelike symptoms.  She continues to have slightly blurred vision in her right eye.  Her loop recorder showed an episode of atrial fibrillation.  After that her cardiologist Dr. Vega switched her from aspirin to Eliquis 5 mg twice a day that she started taking in addition to Plavix.  Is also taking Crestor 5 mg daily.  Her myalgias have improved but she still has some leg cramps on walking.  She feels that she has a short-term memory problems however as per her daughter she has too much on her mind as she also takes care of her .      Last visit-patient states she has not had any new strokelike symptoms since her last visit.  Her previous symptoms of blurred vision and right arm numbness have now subsided.  She continues to take dual antiplatelets along with Crestor 10 mg.  She tried cutting down her pill to 5 mg but was unable to.  Is tolerating it better now.  Her last lipid panel was as follows-, , LDL 78, HDL 45.  CK was 210.  B12 and vitamin D levels were normal.  She also had a Holter monitor that showed PVCs and PACs but no atrial fibrillation.  Has a loop recorder in place now.    Initial [id number]-old female accompanied by her daughter with a history of hypertension, hyperlipidemia, breast cancer status post mastectomy, anxiety, depression, arthritis presents as a hospital follow-up for stroke.  As per patient she had symptoms of blurred vision along with right arm numbness and a headache on 10/14.  She was initially taken to an outside hospital and subsequently transferred here.  Her initial blood pressure was 195/104..  Initial CT scan of the head showed arachnoid cyst in the left temporal lobe but no any acute abnormalities.  She had a CT angiogram of the head and neck that showed right distal vertebral occlusion as well as left P1/P2 occlusion with no significant intracarotid stenosis.  MRI brain showed acute infarcts  in the left medial occipital lobe as well as an splenium of corpus callosum.  EEG was normal. VIDAL showed a normal EF with left ventricular concentric hypertrophy but normal left atrial appendage thrombus or PFO.  She was started on dual antiplatelets as well as Lipitor 80 mg daily.  Her lipid panel was as follows-, , , HDL 30.  A1c was 5.7.  Patient could not tolerate Lipitor and she was recently switched to Crestor 10 mg daily by her cardiologist but is unable to tolerate that as well as she is having pain in her legs.  She also feels tired at all times.  Had a Holter monitor after discharge the results of which are still pending.  Her systolic blood pressure at home is still running high in the 140s and 150s.  Of note-I personally reviewed her MRI brain and Dr. Jim's's notes.    The following portions of the patient's history were reviewed today and updated as of 11/12/2020  : allergies, current medications, past family history, past medical history, past social history, past surgical history and problem list  These document will be scanned to patient's chart.      Current Outpatient Medications:     amLODIPine (NORVASC) 10 MG tablet, , Disp: , Rfl:     apixaban (Eliquis) 5 MG tablet tablet, Take 1 tablet by mouth 2 (Two) Times a Day. First dose Thursday 9/29/2022, Disp: 60 tablet, Rfl: 2    busPIRone (BUSPAR) 5 MG tablet, Take 1 tablet by mouth 2 (two) times a day., Disp: , Rfl:     carvedilol (COREG) 3.125 MG tablet, Take 1 tablet by mouth 2 (Two) Times a Day., Disp: 180 tablet, Rfl: 1    Coenzyme Q10 (COQ10 PO), Take 1 tablet by mouth Daily., Disp: , Rfl:     colestipol (Colestid) 1 g tablet, Take 1 tablet by mouth Daily., Disp: 30 tablet, Rfl: 6    dofetilide (TIKOSYN) 125 MCG capsule, TAKE 1 CAPSULE BY MOUTH EVERY 12 HOURS, Disp: 90 capsule, Rfl: 2    esomeprazole (nexIUM) 40 MG capsule, TAKE 1 CAPSULE BY MOUTH IN THE MORNING BEFORE BREAKFAST, Disp: 30 capsule, Rfl: 1    losartan  "(COZAAR) 100 MG tablet, Take 1 tablet by mouth Daily., Disp: , Rfl:     Multiple Vitamins-Minerals (multivitamin, eye vitamin,) tablet tablet, Take 1 tablet by mouth Daily., Disp: , Rfl:     pramipexole (MIRAPEX) 0.125 MG tablet, Take 2 tablets by mouth Every Night., Disp: , Rfl:    Past Medical History:   Diagnosis Date    Asthma ???    Atrial fibrillation Don't know    Breast cancer     Coronary artery disease Don't  know    Dyslipidemia     Hyperlipidemia Yrs ago    Hypertension       Past Surgical History:   Procedure Laterality Date    BREAST BIOPSY Right     2015    CARDIAC ELECTROPHYSIOLOGY PROCEDURE N/A 12/17/2020    Procedure: Loop insertion;  Surgeon: Nicanor Vega III, MD;  Location:  [facility];  Service: Cardiovascular;  Laterality: N/A;    CARDIAC ELECTROPHYSIOLOGY PROCEDURE N/A 09/27/2022    Procedure: Pacemaker DC new and loop removal. Hold Eliquis 2 days prior.;  Surgeon: Chuck Chavez MD;  Location:  [facility];  Service: Cardiology;  Laterality: N/A;    CHOLECYSTECTOMY      INSERT / REPLACE / REMOVE PACEMAKER  10 2022    PARATHYROIDECTOMY      REPLACEMENT TOTAL KNEE Bilateral       Family History   Problem Relation Age of Onset    Breast cancer Neg Hx     Ovarian cancer Neg Hx       Social History     Socioeconomic History    Marital status:    Tobacco Use    Smoking status: Never     Passive exposure: Never    Smokeless tobacco: Never   Vaping Use    Vaping Use: Never used   Substance and Sexual Activity    Alcohol use: Never    Drug use: Never    Sexual activity: Not Currently     Partners: Male     Birth control/protection: Surgical     Review of Systems   All other systems reviewed and are negative.      Objective:    /84   Pulse 62   Ht 167.6 cm (66\")   Wt 78.5 kg (173 lb)   SpO2 97%   BMI 27.92 kg/m²     Neurology Exam:    General apperance: NAD.     Mental status: Alert, awake and oriented to time place and person.  Could tell me the " month, year, her daughter's date of birth and her ZIP Code    Recent and Remote memory: Intact.    Attention span and Concentration: Normal.     Language and Speech: Intact- No dysarthria.    Fluency, Naming , Repitition and Comprehension:  Intact    Cranial Nerves:   CN II: Visual fields are full. Intact. Fundi - Normal, No papillederma, Pupils - CRISTAL  CN III, IV and VI: Extraocular movements are intact. Normal saccades.   CN V: Facial sensation is intact.   CN VII: Muscles of facial expression reveal no asymmetry. Intact.   CN VIII: Hearing is intact. Whispered voice intact.   CN IX and X: Palate elevates symmetrically. Intact  CN XI: Shoulder shrug is intact.   CN XII: Tongue is midline without evidence of atrophy or fasciculation.     Ophthalmoscopic exam of optic disc- deferred    Motor:  Right UE muscle strength 5/5. Normal tone.     Left UE muscle strength 5/5. Normal tone.      Right LE muscle strength5/5. Normal tone.     Left LE muscle strength 5/5. Normal tone.      Sensory: Normal light touch, vibration and pinprick sensation bilaterally.    DTRs: 2+ bilaterally in upper and lower extremities.    Babinski: Negative bilaterally.    Co-ordination: Normal finger-to-nose, heel to shin B/L.    Rhomberg: Negative.    Gait: normal    Cardiovascular: Regular rate and rhythm without murmur, gallop or rub.    Assessment and Plan:  1. Cerebrovascular accident (CVA), unspecified mechanism (CMS/HCC)  Patient had acute infarcts in the left PCA territory along with left P1/P2 occlusion and distal right vertebral occlusion.  Holter monitor showed PACs and PVCs but no A. fib.  Her loop recorder showed paroxysmal atrial fibrillation.  Last loop recorder showed bradycardia.  She now has a pacemaker.  Has also been started on Tikosyn for A-fib  She should continue Eliquis 5 mg twice a day  I will get a repeat lipid panel and if it is elevated I will restart her on low doses of Crestor 5 to 10 mg  Goal blood pressure less  than 130/90.  Blood pressure is well controlled now  Last A1c of 5.7.  I will recheck her A1c today.  I have once again counseled her to keep herself well-hydrated she complains of mild postural lightheadedness and is currently on multiple blood pressure medications           Return in about 1 year (around 11/20/2024).       Berna Araiza MD

## 2024-02-22 RX ORDER — DOFETILIDE 0.12 MG/1
125 CAPSULE ORAL EVERY 12 HOURS
Qty: 90 CAPSULE | Refills: 1 | Status: SHIPPED | OUTPATIENT
Start: 2024-02-22

## 2024-03-13 ENCOUNTER — OFFICE VISIT (OUTPATIENT)
Dept: CARDIOLOGY | Facility: CLINIC | Age: 79
End: 2024-03-13
Payer: MEDICARE

## 2024-03-13 VITALS
SYSTOLIC BLOOD PRESSURE: 156 MMHG | HEIGHT: 66 IN | WEIGHT: 176.2 LBS | HEART RATE: 60 BPM | OXYGEN SATURATION: 96 % | BODY MASS INDEX: 28.32 KG/M2 | DIASTOLIC BLOOD PRESSURE: 78 MMHG

## 2024-03-13 DIAGNOSIS — R19.7 DIARRHEA, UNSPECIFIED TYPE: ICD-10-CM

## 2024-03-13 DIAGNOSIS — I49.5 SSS (SICK SINUS SYNDROME): ICD-10-CM

## 2024-03-13 DIAGNOSIS — I48.0 PAROXYSMAL ATRIAL FIBRILLATION: Primary | ICD-10-CM

## 2024-03-13 DIAGNOSIS — I10 ESSENTIAL HYPERTENSION: ICD-10-CM

## 2024-03-13 DIAGNOSIS — I48.0 PAROXYSMAL ATRIAL FIBRILLATION: ICD-10-CM

## 2024-03-13 DIAGNOSIS — E78.2 MIXED HYPERLIPIDEMIA: Primary | ICD-10-CM

## 2024-03-13 RX ORDER — MONTELUKAST SODIUM 4 MG/1
1 TABLET, CHEWABLE ORAL DAILY
Qty: 30 TABLET | Refills: 5 | Status: SHIPPED | OUTPATIENT
Start: 2024-03-13

## 2024-03-13 RX ORDER — CHOLECALCIFEROL (VITAMIN D3) 125 MCG
10 CAPSULE ORAL NIGHTLY
COMMUNITY

## 2024-03-13 NOTE — PROGRESS NOTES
"Yumi Crooks  1945  [phone]    03/13/2024    [facility] CARDIOLOGY     Referring Provider: No ref. provider found     Sofia Oquendo, APRN  [address]  [facility] [address]    Chief Complaint   Patient presents with    SSS (sick sinus syndrome)     Problem List:  Sick Sinus Syndrome  Loop recorder interrogation 7/21/2022 demonstrating episodes of sinus bradycardia during waking hours, consistent with symptoms of fatigue.   9/27/22 Placement of [employer] pacemaker dual chamber.  Paroxysmal atrial fibrillation  CHADSVasc = 6  on Eliquis  Diagnosed on loop recorder interrogation April 2021  Previous CVA 2020   Successful ECV 2/17/23  Failed Flecainide  Initiation of Tikosyn March 2023  Hypertension  Hyperlipidemia  CVA 10/2020  MRI evidence of multiple small infarcts in the left splenium of the corpus callosum and medial left occipital lobe  EEG unremarkable  VIDAL 10/16/2020: EF 61 to 65%, no evidence of left atrial appendage thrombus, saline test negative for right to left atrial level shunt  Loop recorder implant 12/17/2020-later found to have diagnosis of atrial fibrillation April 2021  History of breast cancer status post right mastectomy on Arimidex  Anxiety  Depression    Allergies  Allergies   Allergen Reactions    Lortab [Hydrocodone-Acetaminophen] Other (See Comments)     \"hyperventilate\"    Lipitor [Atorvastatin] Myalgia    Penicillins Unknown - Low Severity     Had too much as a kid    Sulfa Antibiotics Unknown - Low Severity      \"too much as a kid...and, it ruined my teeth\"       Current Medications    Current Outpatient Medications:     amLODIPine (NORVASC) 10 MG tablet, Take 1 tablet by mouth Daily., Disp: , Rfl:     apixaban (Eliquis) 5 MG tablet tablet, Take 1 tablet by mouth 2 (Two) Times a Day. First dose Thursday 9/29/2022, Disp: 60 tablet, Rfl: 2    busPIRone (BUSPAR) 5 MG tablet, Take 1 tablet by mouth 2 (two) times a day., Disp: , Rfl:     carvedilol " "(COREG) 3.125 MG tablet, Take 1 tablet by mouth 2 (Two) Times a Day., Disp: 180 tablet, Rfl: 1    Coenzyme Q10 (COQ10 PO), Take 1 tablet by mouth Daily., Disp: , Rfl:     dofetilide (TIKOSYN) 125 MCG capsule, TAKE 1 CAPSULE BY MOUTH EVERY 12 HOURS, Disp: 90 capsule, Rfl: 1    esomeprazole (nexIUM) 40 MG capsule, TAKE 1 CAPSULE BY MOUTH IN THE MORNING BEFORE BREAKFAST, Disp: 30 capsule, Rfl: 1    losartan (COZAAR) 100 MG tablet, Take 1 tablet by mouth Daily., Disp: , Rfl:     melatonin 5 MG tablet tablet, Take 2 tablets by mouth Every Night., Disp: , Rfl:     Multiple Vitamins-Minerals (multivitamin, eye vitamin,) tablet tablet, Take 1 tablet by mouth Daily., Disp: , Rfl:     pramipexole (MIRAPEX) 0.125 MG tablet, Take 2 tablets by mouth Every Night., Disp: , Rfl:     colestipol (Colestid) 1 g tablet, Take 1 tablet by mouth Daily., Disp: 30 tablet, Rfl: 5    History of Present Illness     Pt presents for follow up status post pacemaker placement for sick sinus syndrome, hypertension, afib. Since we last saw the pt, pt reports increased SOB with exertion and fatigue that has been going on for a few months. She has to rest often in between activity. She has intermittent rare palpitations now, better since initiating Tikosyn. Denies any hospitalizations, ER visits, bleeding, or TIA/CVA symptoms. She does not check BP at home, it is slightly elevated today.     ROS:  General:  Denies fatigue, weight gain or loss  Cardiovascular:  Denies CP, PND, syncope, near syncope, edema or palpitations.  Pulmonary:  Denies MARTINEZ, cough, or wheezing      Vitals:    03/13/24 1515   BP: 156/78   BP Location: Left arm   Patient Position: Sitting   Pulse: 60   SpO2: 96%   Weight: 79.9 kg (176 lb 3.2 oz)   Height: 167.6 cm (66\")     Body mass index is 28.44 kg/m².  PE:  General: NAD  Neck: no JVD, no carotid bruits, no TM  Heart RRR, NL S1, S2, S4 present, no rubs, murmurs  Lungs: CTA, no wheezes, rhonchi, or rales  Abd: soft, non-tender, NL " BS  Ext: No musculoskeletal deformities, no edema, cyanosis, or clubbing  Psych: normal mood and affect    Diagnostic Data:    [employer] pacemaker.  [employer] dual-chamber pacemaker rate 60.  A paced 91%.  RV paced <1%.  Normal P wave R wave thresholds impedances.  Battery voltage 6 years.  Sinus bradycardia.  <1% afib, longest episode 20 hours.       ECG 12 Lead    Date/Time: 3/13/2024 4:05 PM  Performed by: Herminia Leavitt APRN    Authorized by: Herminia Leavitt APRN  Comparison: compared with previous ECG from 6/26/2023  Similar to previous ECG  Rhythm: sinus rhythm and paced  Rate: normal  BPM: 62          Advance Care Planning   ACP discussion was held with the patient during this visit. Patient has an advance directive in EMR which is still valid.        1. Paroxysmal atrial fibrillation    2. SSS (sick sinus syndrome)    3. Essential hypertension        Plan:  1. PAF   -CHADSVasc = 7 on Eliquis  -Diagnosed on loop recorder interrogation April 2021. ECV 2/17/23, failed flecainide therapy  -Continue coreg.   -Initiation of Tikosyn March 2023.  EKG today stable.  Acceptable QTc.         2. Symptomatic SSS:   S/p BSC DC PM, Normal device interrogation today     3. HTN:   -Norvasc decreased at last office visit due to hypotension. Will monitor at home, continue present medications      4. HLD   -Statin. PCP following labs.      5. CVA: Lifelong eliquis    6. Dyspnea   -increased SOB over the last few months, moderate in nature with any activity, will repeat echo to reassess LVEF and valve function     Follow up in 6 months.     Electronically signed by DIONY Jean, 03/13/24, 3:59 PM EDT.

## 2024-03-27 ENCOUNTER — HOSPITAL ENCOUNTER (OUTPATIENT)
Dept: CARDIOLOGY | Facility: HOSPITAL | Age: 79
Discharge: HOME | End: 2024-03-27
Admitting: INTERNAL MEDICINE
Payer: MEDICARE

## 2024-03-27 DIAGNOSIS — I48.0 PAROXYSMAL ATRIAL FIBRILLATION: ICD-10-CM

## 2024-03-27 DIAGNOSIS — I49.5 SSS (SICK SINUS SYNDROME): ICD-10-CM

## 2024-03-27 LAB
ASCENDING AORTA: 4.4 CM
BH CV ECHO MEAS - AO MAX PG: 13.2 MMHG
BH CV ECHO MEAS - AO MEAN PG: 7 MMHG
BH CV ECHO MEAS - AO ROOT DIAM: 3.2 CM
BH CV ECHO MEAS - AO V2 MAX: 182 CM/SEC
BH CV ECHO MEAS - AO V2 VTI: 36.2 CM
BH CV ECHO MEAS - AVA(I,D): 2.7 CM2
BH CV ECHO MEAS - EDV(CUBED): 102.5 ML
BH CV ECHO MEAS - EDV(MOD-SP2): 56 ML
BH CV ECHO MEAS - EDV(MOD-SP4): 83 ML
BH CV ECHO MEAS - EF(MOD-BP): 56 %
BH CV ECHO MEAS - EF(MOD-SP2): 48.2 %
BH CV ECHO MEAS - EF(MOD-SP4): 59 %
BH CV ECHO MEAS - ESV(CUBED): 18.8 ML
BH CV ECHO MEAS - ESV(MOD-SP2): 29 ML
BH CV ECHO MEAS - ESV(MOD-SP4): 34 ML
BH CV ECHO MEAS - FS: 43.2 %
BH CV ECHO MEAS - IVS/LVPW: 0.89 CM
BH CV ECHO MEAS - IVSD: 0.98 CM
BH CV ECHO MEAS - LA DIMENSION: 3.2 CM
BH CV ECHO MEAS - LV DIASTOLIC VOL/BSA (35-75): 43.8 CM2
BH CV ECHO MEAS - LV MASS(C)D: 173.9 GRAMS
BH CV ECHO MEAS - LV MAX PG: 6.3 MMHG
BH CV ECHO MEAS - LV MEAN PG: 3 MMHG
BH CV ECHO MEAS - LV SYSTOLIC VOL/BSA (12-30): 17.9 CM2
BH CV ECHO MEAS - LV V1 MAX: 125 CM/SEC
BH CV ECHO MEAS - LV V1 VTI: 31.2 CM
BH CV ECHO MEAS - LVIDD: 4.7 CM
BH CV ECHO MEAS - LVIDS: 2.7 CM
BH CV ECHO MEAS - LVOT AREA: 3.1 CM2
BH CV ECHO MEAS - LVOT DIAM: 2 CM
BH CV ECHO MEAS - LVPWD: 1.11 CM
BH CV ECHO MEAS - MV A MAX VEL: 116 CM/SEC
BH CV ECHO MEAS - MV DEC SLOPE: 287 CM/SEC2
BH CV ECHO MEAS - MV DEC TIME: 0.3 SEC
BH CV ECHO MEAS - MV E MAX VEL: 99.2 CM/SEC
BH CV ECHO MEAS - MV E/A: 0.86
BH CV ECHO MEAS - MV MAX PG: 6.1 MMHG
BH CV ECHO MEAS - MV MEAN PG: 3 MMHG
BH CV ECHO MEAS - MV P1/2T: 111.2 MSEC
BH CV ECHO MEAS - MV V2 VTI: 40.6 CM
BH CV ECHO MEAS - MVA(P1/2T): 1.98 CM2
BH CV ECHO MEAS - MVA(VTI): 2.41 CM2
BH CV ECHO MEAS - PA ACC SLOPE: 760 CM/SEC2
BH CV ECHO MEAS - PA ACC TIME: 0.11 SEC
BH CV ECHO MEAS - RAP SYSTOLE: 8 MMHG
BH CV ECHO MEAS - RVSP: 33 MMHG
BH CV ECHO MEAS - SI(MOD-SP2): 14.3 ML/M2
BH CV ECHO MEAS - SI(MOD-SP4): 25.9 ML/M2
BH CV ECHO MEAS - SV(LVOT): 98 ML
BH CV ECHO MEAS - SV(MOD-SP2): 27 ML
BH CV ECHO MEAS - SV(MOD-SP4): 49 ML
BH CV ECHO MEAS - TAPSE (>1.6): 1.6 CM
BH CV ECHO MEAS - TR MAX PG: 24.7 MMHG
BH CV ECHO MEAS - TR MAX VEL: 247.8 CM/SEC
BH CV VAS BP LEFT ARM: NORMAL MMHG
BH CV XLRA - RV BASE: 3.9 CM
BH CV XLRA - RV LENGTH: 7.5 CM
BH CV XLRA - RV MID: 3 CM
BH CV XLRA - TDI S': 12.4 CM/SEC
LEFT ATRIUM VOLUME INDEX: 25.4 ML/M2

## 2024-03-27 PROCEDURE — 93306 TTE W/DOPPLER COMPLETE: CPT

## 2024-03-29 ENCOUNTER — DOCUMENTATION (OUTPATIENT)
Dept: CARDIOLOGY | Facility: CLINIC | Age: 79
End: 2024-03-29
Payer: MEDICARE

## 2024-03-29 NOTE — PROGRESS NOTES
Called patient to let her know that her echocardiogram was WNL, except for mild ascending aorta dilation of 4.4 cm, which we will monitor. She is feeling better after her accelerometer on her PM was adjusted at her last office visit. No new recommendations at this time.     Electronically signed by YOSHI Bernal, 03/29/24, 10:52 AM EDT.

## 2024-05-09 ENCOUNTER — PATIENT ROUNDING (BHMG ONLY) (OUTPATIENT)
Dept: GASTROENTEROLOGY | Facility: CLINIC | Age: 79
End: 2024-05-09
Payer: MEDICARE

## 2024-05-09 NOTE — PROGRESS NOTES
May 9, 2024    Hello, may I speak with Yumi Crooks?    My name is ***    Maryuri Blackman  I am  with [facility]  [facility]  [address] [address] [phone].    Before we get started may I verify your date of birth? 1945    I am calling to officially welcome you to our practice and ask about your recent visit. Is this a good time to talk? yes    Tell me about your visit with us. What things went well?  ***  People were nice and helped her resolve her issues     We're always looking for ways to make our patients' experiences even better. Do you have recommendations on ways we may improve?  no    Overall were you satisfied with your first visit to our practice? yes       I appreciate you taking the time to speak with me today. Is there anything else I can do for you? no      Thank you, and have a great day.

## 2024-05-20 ENCOUNTER — OFFICE VISIT (OUTPATIENT)
Dept: GASTROENTEROLOGY | Facility: CLINIC | Age: 79
End: 2024-05-20
Payer: MEDICARE

## 2024-05-20 ENCOUNTER — LAB (OUTPATIENT)
Dept: LAB | Facility: HOSPITAL | Age: 79
End: 2024-05-20
Payer: MEDICARE

## 2024-05-20 VITALS
TEMPERATURE: 98.2 F | SYSTOLIC BLOOD PRESSURE: 132 MMHG | WEIGHT: 173 LBS | BODY MASS INDEX: 27.8 KG/M2 | OXYGEN SATURATION: 98 % | DIASTOLIC BLOOD PRESSURE: 78 MMHG | HEIGHT: 66 IN | HEART RATE: 75 BPM

## 2024-05-20 DIAGNOSIS — R19.7 DIARRHEA, UNSPECIFIED TYPE: Primary | ICD-10-CM

## 2024-05-20 PROCEDURE — 99203 OFFICE O/P NEW LOW 30 MIN: CPT | Performed by: INTERNAL MEDICINE

## 2024-05-20 PROCEDURE — 1160F RVW MEDS BY RX/DR IN RCRD: CPT | Performed by: INTERNAL MEDICINE

## 2024-05-20 PROCEDURE — 3075F SYST BP GE 130 - 139MM HG: CPT | Performed by: INTERNAL MEDICINE

## 2024-05-20 PROCEDURE — 1159F MED LIST DOCD IN RCRD: CPT | Performed by: INTERNAL MEDICINE

## 2024-05-20 PROCEDURE — 86258 DGP ANTIBODY EACH IG CLASS: CPT | Performed by: INTERNAL MEDICINE

## 2024-05-20 PROCEDURE — 86364 TISS TRNSGLTMNASE EA IG CLAS: CPT | Performed by: INTERNAL MEDICINE

## 2024-05-20 PROCEDURE — 82784 ASSAY IGA/IGD/IGG/IGM EACH: CPT | Performed by: INTERNAL MEDICINE

## 2024-05-20 PROCEDURE — 3078F DIAST BP <80 MM HG: CPT | Performed by: INTERNAL MEDICINE

## 2024-05-20 PROCEDURE — 36415 COLL VENOUS BLD VENIPUNCTURE: CPT | Performed by: INTERNAL MEDICINE

## 2024-05-20 PROCEDURE — 86231 EMA EACH IG CLASS: CPT | Performed by: INTERNAL MEDICINE

## 2024-05-20 RX ORDER — ANASTROZOLE 1 MG/1
TABLET ORAL
COMMUNITY

## 2024-05-20 RX ORDER — CLOPIDOGREL BISULFATE 75 MG/1
1 TABLET ORAL DAILY
COMMUNITY

## 2024-05-20 RX ORDER — ROSUVASTATIN CALCIUM 10 MG/1
1 TABLET, COATED ORAL DAILY
COMMUNITY

## 2024-05-20 NOTE — PROGRESS NOTES
New Patient Consultation     Patient Name: Yumi Crooks  : 1945   MRN: [id number]     Chief Complaint   Patient presents with    Diarrhea     Intermittent,        History of Present Illness: Yumi Crooks is a 79 y.o. female, PMH includes sick sinus syndrome on plavix and eliquis with pacemaker,  CVA who is here today for a Gastroenterology Consultation for chronic diarrhea,.  She reports has had diarrhea since .  Patient has had her gallbladder out.  Patient reports immodium helps.  Patient never gets constipated.  Patient has more symptoms in the morning.  She never knows when it will hit her.  Patient reports has urgency and does not go anywhere if it starts in the morning.  Patient has had accident    Most of the time bowels act after she eats breakfast.  Then she will have a bowel movement type 2 then one hour later and has type 7 then burns and has acid    Patient taking immodium only PRN;      Patient gallbladder out prior to  (inflammed)    Sausage, mexican foods, lettuce affects her; Beans bother her    Patient reports daughter wanted her to be seen    Patient denies weight loss  Patient denies any abdominal pain    Patient denies nocturnal symptoms.      Has had acid reflux for years    Patient denies personal or FHx of PUD, H Pylori, gastritis, pancreatitis, colitis, Celiac disease, UC, Crohn's disease, IBS, colon or gastric cancers. Pt denies EtOH, tobacco, illicit substance or NSAID use.    Last EGD: negative  Last Colon: [facility] and polyps    Subjective      Review of Systems   Constitutional: Negative.    HENT: Negative.     Eyes: Negative.    Respiratory: Negative.     Cardiovascular: Negative.    Gastrointestinal:  Positive for diarrhea and nausea.   Endocrine: Negative.    Genitourinary: Negative.    Musculoskeletal: Negative.    Skin: Negative.    Allergic/Immunologic: Negative.    Neurological: Negative.    Hematological: Negative.     Psychiatric/Behavioral: Negative.         Past Medical History:   Diagnosis Date    Asthma ???    Atrial fibrillation Don't know    Breast cancer     Coronary artery disease Don't  know    Dyslipidemia     Hyperlipidemia Yrs ago    Hypertension        Past Surgical History:   Procedure Laterality Date    BREAST BIOPSY Right     2015    CARDIAC ELECTROPHYSIOLOGY PROCEDURE N/A 12/17/2020    Procedure: Loop insertion;  Surgeon: Nicanor Vega III, MD;  Location:  [facility];  Service: Cardiovascular;  Laterality: N/A;    CARDIAC ELECTROPHYSIOLOGY PROCEDURE N/A 09/27/2022    Procedure: Pacemaker DC new and loop removal. Hold Eliquis 2 days prior.;  Surgeon: Chuck Chavez MD;  Location:  [facility];  Service: Cardiology;  Laterality: N/A;    CHOLECYSTECTOMY      COLONOSCOPY      INSERT / REPLACE / REMOVE PACEMAKER  10 2022    PARATHYROIDECTOMY      REPLACEMENT TOTAL KNEE Bilateral        Family History   Problem Relation Age of Onset    Breast cancer Neg Hx     Ovarian cancer Neg Hx     Colon cancer Neg Hx     Colon polyps Neg Hx     Esophageal cancer Neg Hx        Social History     Socioeconomic History    Marital status:    Tobacco Use    Smoking status: Never     Passive exposure: Never    Smokeless tobacco: Never   Vaping Use    Vaping status: Never Used   Substance and Sexual Activity    Alcohol use: Never    Drug use: Never    Sexual activity: Not Currently     Partners: Male     Birth control/protection: Surgical       Social History     Substance and Sexual Activity   Alcohol Use Never     Social History     Tobacco Use   Smoking Status Never    Passive exposure: Never   Smokeless Tobacco Never         Current Outpatient Medications:     amLODIPine (NORVASC) 10 MG tablet, Take 1 tablet by mouth Daily., Disp: , Rfl:     anastrozole (ARIMIDEX) 1 MG tablet, , Disp: , Rfl:     apixaban (Eliquis) 5 MG tablet tablet, Take 1 tablet by mouth 2 (Two) Times a Day. First dose  "Thursday 9/29/2022, Disp: 60 tablet, Rfl: 2    busPIRone (BUSPAR) 5 MG tablet, Take 1 tablet by mouth 2 (two) times a day., Disp: , Rfl:     carvedilol (COREG) 3.125 MG tablet, Take 1 tablet by mouth 2 (Two) Times a Day., Disp: 180 tablet, Rfl: 1    clopidogrel (PLAVIX) 75 MG tablet, Take 1 tablet by mouth Daily., Disp: , Rfl:     Coenzyme Q10 (COQ10 PO), Take 1 tablet by mouth Daily., Disp: , Rfl:     colestipol (Colestid) 1 g tablet, Take 1 tablet by mouth Daily., Disp: 30 tablet, Rfl: 5    dofetilide (TIKOSYN) 125 MCG capsule, TAKE 1 CAPSULE BY MOUTH EVERY 12 HOURS, Disp: 90 capsule, Rfl: 1    esomeprazole (nexIUM) 40 MG capsule, TAKE 1 CAPSULE BY MOUTH IN THE MORNING BEFORE BREAKFAST, Disp: 30 capsule, Rfl: 1    losartan (COZAAR) 100 MG tablet, Take 1 tablet by mouth Daily., Disp: , Rfl:     melatonin 5 MG tablet tablet, Take 2 tablets by mouth Every Night., Disp: , Rfl:     Multiple Vitamins-Minerals (multivitamin, eye vitamin,) tablet tablet, Take 1 tablet by mouth Daily., Disp: , Rfl:     pramipexole (MIRAPEX) 0.125 MG tablet, Take 2 tablets by mouth Every Night., Disp: , Rfl:     rosuvastatin (CRESTOR) 10 MG tablet, Take 1 tablet by mouth Daily., Disp: , Rfl:     Allergies   Allergen Reactions    Lortab [Hydrocodone-Acetaminophen] Other (See Comments)     \"hyperventilate\"    Lipitor [Atorvastatin] Myalgia    Penicillins Unknown - Low Severity     Had too much as a kid    Sulfa Antibiotics Unknown - Low Severity      \"too much as a kid...and, it ruined my teeth\"       Objective     Physical Exam:  Vitals:    05/20/24 1447   BP: 132/78   Pulse: 75   Temp: 98.2 °F (36.8 °C)   SpO2: 98%   Weight: 78.5 kg (173 lb)   Height: 167.6 cm (65.98\")     Body mass index is 27.94 kg/m².     Physical Exam  Constitutional:       Appearance: Normal appearance.   Pulmonary:      Effort: Pulmonary effort is normal.      Breath sounds: Normal breath sounds.   Abdominal:      General: Abdomen is flat. Bowel sounds are normal.     "  Palpations: Abdomen is soft.   Skin:     General: Skin is warm.      Comments: Knee scars   Neurological:      General: No focal deficit present.      Mental Status: She is alert and oriented to person, place, and time.       Assessment / Plan      1. Diarrhea, unspecified type  Differential likely IBS, bile salt diarrhea. Could still be microscopic colitis    - Celiac Comprehensive Panel  - Calprotectin, Fecal - Stool, Per Rectum; Future  - Pancreatic Elastase, Fecal - Stool, Per Rectum; Future   Scheduled immodium at night  Samples of IBD guard given  Discussed risk/benefit of colonoscopy and at this time patient would like to try noninvasive measures first since she has had symptoms for 10 years without anemia or weight loss.  Daughter concerned about stroke risk with holding any anticoagulation and overall heart status with a colonoscopy        Follow Up:   3 months  Could go back to colestipol but at this time trial schedule immodium first and await studies    Plan of care reviewed with the patient at the conclusion of today's visit.  Education was provided regarding diagnosis, management, and any prescribed or recommended OTC medications.  Patient verbalized understanding of and agreement with management plan.     NOTE TO PATIENT: The 21st Century Cures Act makes medical notes like these available to patients in the interest of transparency. However, be advised this is a medical document. It is intended as peer to peer communication. It is written in medical language and may contain abbreviations or verbiage that are unfamiliar. It may appear blunt or direct. Medical documents are intended to carry relevant information, facts as evident, and the clinical opinion of the practitioner.     Ara Tafoya MD   [facility]    Part of this note may be an electronic transcription/translation of spoken language to printed text using the Dragon Dictation System.

## 2024-05-21 RX ORDER — DOFETILIDE 0.12 MG/1
125 CAPSULE ORAL EVERY 12 HOURS
Qty: 90 CAPSULE | Refills: 0 | Status: SHIPPED | OUTPATIENT
Start: 2024-05-21

## 2024-05-22 LAB
ENDOMYSIUM IGA SER QL: NEGATIVE
GLIADIN PEPTIDE IGA SER-ACNC: 5 UNITS (ref 0–19)
GLIADIN PEPTIDE IGG SER-ACNC: 2 UNITS (ref 0–19)
IGA SERPL-MCNC: 125 MG/DL (ref 64–422)
TTG IGA SER-ACNC: <2 U/ML (ref 0–3)
TTG IGG SER-ACNC: 6 U/ML (ref 0–5)

## 2024-06-12 ENCOUNTER — PATIENT ROUNDING (BHMG ONLY) (OUTPATIENT)
Dept: GASTROENTEROLOGY | Facility: CLINIC | Age: 79
End: 2024-06-12
Payer: MEDICARE

## 2024-06-12 NOTE — LETTER
Yumi Crooks  [address] [place] [id number]          June 12, 2024      Dear Celestine Marsh, my name is Vilma, Practice Lead      I am with __________________ [facility] and want to officially welcome you to our practice and ask about your recent visit.       Tell me about your visit with us. What things went well?        We're always looking for ways to make our patients' experiences even better. Do you have recommendations on ways we may improve?        Overall were you satisfied with your first visit to our practice?        I appreciate you taking the time to respond to me today, if there is anything else our practice can do for you please contact us at [phone]      Thank you,     Vilma

## 2024-06-12 NOTE — PROGRESS NOTES
[employer] MESSAGE HAS BEEN SENT TO THE PATIENT FOR PATIENT ROUNDING WITH [facility] [place], [place]

## 2024-06-20 RX ORDER — DOFETILIDE 0.12 MG/1
125 CAPSULE ORAL EVERY 12 HOURS
Qty: 60 CAPSULE | Refills: 0 | Status: SHIPPED | OUTPATIENT
Start: 2024-06-20

## 2024-07-11 ENCOUNTER — TELEPHONE (OUTPATIENT)
Dept: CARDIOLOGY | Facility: CLINIC | Age: 79
End: 2024-07-11
Payer: MEDICARE

## 2024-07-11 NOTE — TELEPHONE ENCOUNTER
Pt has a dual chamber [employer] pacemaker. Pt's home monitor hasn't communicated with the [employer] web site since 4/4/2024.     Called pt, left message for pt to call the device clinic, call back phone number provided.

## 2024-07-11 NOTE — TELEPHONE ENCOUNTER
Caller: MonsivaisSofia (POA)    Relationship: Emergency Contact    Best call back number: [phone]    What is the best time to reach you: ANY    Who are you requesting to speak with (clinical staff, provider,  specific staff member): CLINICAL      What was the call regarding: MS MONSIVAIS CALLED TO ADVISE THAT THE PATIENT 'S HEART RATE SEEMS TO CONTINUE TO BEAT SLOWLY FOR THE PAST MONTH. PLEASE CONTACT MS MONSIVAIS IN REGARDS TO THIS ISSUE.    Is it okay if the provider responds through [employer]: PLEASE CALL

## 2024-07-11 NOTE — TELEPHONE ENCOUNTER
Pt called back, the monitor was unplugged. Assisted pt with manual pacemaker transmission.     Pt reports she's easily fatigued, has low energy & shortness of breath with any exertion.     Normal device function, no alerts.   AP 99% since 3/13/2024   5% since 3/13/2024  Zero Afib    Two very brief episodes of AF w/RVR  Longest episode: 5 seconds.  V-rates: 168 bpm.

## 2024-07-19 RX ORDER — DOFETILIDE 0.12 MG/1
125 CAPSULE ORAL EVERY 12 HOURS
Qty: 60 CAPSULE | Refills: 5 | OUTPATIENT
Start: 2024-07-19

## 2024-11-25 ENCOUNTER — OFFICE VISIT (OUTPATIENT)
Dept: NEUROLOGY | Facility: CLINIC | Age: 79
End: 2024-11-25
Payer: MEDICARE

## 2024-11-25 VITALS
HEIGHT: 66 IN | OXYGEN SATURATION: 95 % | HEART RATE: 65 BPM | SYSTOLIC BLOOD PRESSURE: 138 MMHG | DIASTOLIC BLOOD PRESSURE: 84 MMHG | WEIGHT: 171 LBS | BODY MASS INDEX: 27.48 KG/M2

## 2024-11-25 DIAGNOSIS — I69.30 SEQUELAE, POST-STROKE: Primary | ICD-10-CM

## 2024-11-25 PROCEDURE — 3075F SYST BP GE 130 - 139MM HG: CPT | Performed by: PSYCHIATRY & NEUROLOGY

## 2024-11-25 PROCEDURE — 99213 OFFICE O/P EST LOW 20 MIN: CPT | Performed by: PSYCHIATRY & NEUROLOGY

## 2024-11-25 PROCEDURE — 1159F MED LIST DOCD IN RCRD: CPT | Performed by: PSYCHIATRY & NEUROLOGY

## 2024-11-25 PROCEDURE — 1160F RVW MEDS BY RX/DR IN RCRD: CPT | Performed by: PSYCHIATRY & NEUROLOGY

## 2024-11-25 PROCEDURE — 3079F DIAST BP 80-89 MM HG: CPT | Performed by: PSYCHIATRY & NEUROLOGY

## 2024-11-25 RX ORDER — ROSUVASTATIN CALCIUM 10 MG/1
10 TABLET, COATED ORAL DAILY
Qty: 90 TABLET | Refills: 3 | Status: SHIPPED | OUTPATIENT
Start: 2024-11-25

## 2024-11-25 NOTE — PROGRESS NOTES
Subjective:    CC: Yumi Crooks is seen today  for stroke    HPI:  Current visit-patient states she is doing well in the last year with no new strokelike symptoms.  She also denies having any recurrent episodes of atrial fibrillation.  Continues to be on Eliquis and Tikosyn.  Has stopped Plavix now.  Has also started to take Crestor 5 mg daily.  Her last lipid panel recently was as follows-, , , HDL 45.  A1c was 5.8 and GFR was 46.  She states that she keeps herself extremely active by doing things in and outside the house.  Also takes care of her medications and her 's medications who has diabetic retinopathy.  She has been having low back pain especially while standing up doing the dishes.  Denies any radicular pain down her legs.      Last visit-patient denies any strokelike symptoms.  Her pacemaker did show more episodes of A-fib in May.  She continues to take Eliquis and Tikosyn.  Has stopped Plavix now.  Also stopped Crestor which did help with her leg cramps however she still has some muscle spasms at times.  Reports to drinking very little water during the day.  Her PCP had repeated a lipid panel which was slightly elevated (we do not have the results).    Last visit-patient denies any strokelike symptoms since she last saw me.  She had a pacemaker placed for bradycardia.  Also went to the hospital in March with persistent atrial fibrillation for which she was started on Tikosyn.  Continues to take Plavix in addition to Eliquis 5 mg twice daily and Crestor 5 mg daily.  Her last lipid panel was as follows-, , LDL 99, HDL 48.  A1c was 5.7.  Her last CK was 227.  She states that she is having a lot of leg cramps from her waist downwards all the way to her feet that prevent her from sleeping well at night.     Last visit-patient denies any new strokelike symptoms however her last loop recorder tracings showed several episodes of bradycardia with the longest being 42  seconds with a heart rate dropping down to 40 bpm.  She has an upcoming appointment with cardiology to see if she would be a candidate for pacemaker.  Reports to being tired all day long.  She also sustained a major fall in May when she slipped in the bathroom at night and had a big hematoma across her left hip.  Since then she has had right shoulder pain and left hip pain.  Has had several x-rays that did not show any evidence of fracture.  She continues to take Plavix along with Eliquis and Crestor 5 mg daily.  Her blood pressure is running within normal limits.  Addendum- last lipid panel done on 11/29 was as follows-, , , HDL 47. Glucose was 99, normal sodium, normal LFTs and GFR of 57.    Last visit-patient denies having any new strokelike symptoms.  She continues to have slightly blurred vision in her right eye.  Her loop recorder showed an episode of atrial fibrillation.  After that her cardiologist Dr. Vega switched her from aspirin to Eliquis 5 mg twice a day that she started taking in addition to Plavix.  Is also taking Crestor 5 mg daily.  Her myalgias have improved but she still has some leg cramps on walking.  She feels that she has a short-term memory problems however as per her daughter she has too much on her mind as she also takes care of her .      Last visit-patient states she has not had any new strokelike symptoms since her last visit.  Her previous symptoms of blurred vision and right arm numbness have now subsided.  She continues to take dual antiplatelets along with Crestor 10 mg.  She tried cutting down her pill to 5 mg but was unable to.  Is tolerating it better now.  Her last lipid panel was as follows-, , LDL 78, HDL 45.  CK was 210.  B12 and vitamin D levels were normal.  She also had a Holter monitor that showed PVCs and PACs but no atrial fibrillation.  Has a loop recorder in place now.    Initial [id number] female accompanied by her  daughter with a history of hypertension, hyperlipidemia, breast cancer status post mastectomy, anxiety, depression, arthritis presents as a hospital follow-up for stroke.  As per patient she had symptoms of blurred vision along with right arm numbness and a headache on 10/14.  She was initially taken to an outside hospital and subsequently transferred here.  Her initial blood pressure was 195/104..  Initial CT scan of the head showed arachnoid cyst in the left temporal lobe but no any acute abnormalities.  She had a CT angiogram of the head and neck that showed right distal vertebral occlusion as well as left P1/P2 occlusion with no significant intracarotid stenosis.  MRI brain showed acute infarcts in the left medial occipital lobe as well as an splenium of corpus callosum.  EEG was normal. VIDAL showed a normal EF with left ventricular concentric hypertrophy but normal left atrial appendage thrombus or PFO.  She was started on dual antiplatelets as well as Lipitor 80 mg daily.  Her lipid panel was as follows-, , , HDL 30.  A1c was 5.7.  Patient could not tolerate Lipitor and she was recently switched to Crestor 10 mg daily by her cardiologist but is unable to tolerate that as well as she is having pain in her legs.  She also feels tired at all times.  Had a Holter monitor after discharge the results of which are still pending.  Her systolic blood pressure at home is still running high in the 140s and 150s.  Of note-I personally reviewed her MRI brain and Dr. Jim's's notes.    The following portions of the patient's history were reviewed today and updated as of 11/12/2020  : allergies, current medications, past family history, past medical history, past social history, past surgical history and problem list  These document will be scanned to patient's chart.      Current Outpatient Medications:     amLODIPine (NORVASC) 10 MG tablet, Take 1 tablet by mouth Daily., Disp: , Rfl:     anastrozole  (ARIMIDEX) 1 MG tablet, , Disp: , Rfl:     apixaban (Eliquis) 5 MG tablet tablet, Take 1 tablet by mouth 2 (Two) Times a Day. First dose Thursday 9/29/2022, Disp: 60 tablet, Rfl: 2    busPIRone (BUSPAR) 5 MG tablet, Take 1 tablet by mouth 2 (two) times a day., Disp: , Rfl:     carvedilol (COREG) 3.125 MG tablet, Take 1 tablet by mouth 2 (Two) Times a Day., Disp: 180 tablet, Rfl: 1    Coenzyme Q10 (COQ10 PO), Take 1 tablet by mouth Daily., Disp: , Rfl:     colestipol (Colestid) 1 g tablet, Take 1 tablet by mouth Daily., Disp: 30 tablet, Rfl: 5    dofetilide (TIKOSYN) 125 MCG capsule, TAKE 1 CAPSULE BY MOUTH EVERY 12 HOURS, Disp: 60 capsule, Rfl: 5    esomeprazole (nexIUM) 40 MG capsule, TAKE 1 CAPSULE BY MOUTH IN THE MORNING BEFORE BREAKFAST, Disp: 30 capsule, Rfl: 1    losartan (COZAAR) 100 MG tablet, Take 1 tablet by mouth Daily., Disp: , Rfl:     melatonin 5 MG tablet tablet, Take 2 tablets by mouth Every Night., Disp: , Rfl:     Multiple Vitamins-Minerals (multivitamin, eye vitamin,) tablet tablet, Take 1 tablet by mouth Daily., Disp: , Rfl:     pramipexole (MIRAPEX) 0.125 MG tablet, Take 2 tablets by mouth Every Night., Disp: , Rfl:     rosuvastatin (CRESTOR) 10 MG tablet, Take 1 tablet by mouth Daily., Disp: 90 tablet, Rfl: 3   Past Medical History:   Diagnosis Date    Asthma ???    Atrial fibrillation Don't know    Breast cancer     Coronary artery disease Don't  know    Dyslipidemia     Hyperlipidemia Yrs ago    Hypertension       Past Surgical History:   Procedure Laterality Date    BREAST BIOPSY Right     2015    CARDIAC ELECTROPHYSIOLOGY PROCEDURE N/A 12/17/2020    Procedure: Loop insertion;  Surgeon: Nicanor Vega III, MD;  Location: [facility] CATH INVASIVE LOCATION;  Service: Cardiovascular;  Laterality: N/A;    CARDIAC ELECTROPHYSIOLOGY PROCEDURE N/A 09/27/2022    Procedure: Pacemaker DC new and loop removal. Hold Eliquis 2 days prior.;  Surgeon: Chuck Chavez MD;  Location: [facility] EP INVASIVE  "LOCATION;  Service: Cardiology;  Laterality: N/A;    CHOLECYSTECTOMY      COLONOSCOPY      INSERT / REPLACE / REMOVE PACEMAKER  10 2022    PARATHYROIDECTOMY      REPLACEMENT TOTAL KNEE Bilateral       Family History   Problem Relation Age of Onset    Breast cancer Neg Hx     Ovarian cancer Neg Hx     Colon cancer Neg Hx     Colon polyps Neg Hx     Esophageal cancer Neg Hx       Social History     Socioeconomic History    Marital status:    Tobacco Use    Smoking status: Never     Passive exposure: Never    Smokeless tobacco: Never   Vaping Use    Vaping status: Never Used   Substance and Sexual Activity    Alcohol use: Never    Drug use: Never    Sexual activity: Not Currently     Partners: Male     Birth control/protection: Surgical     Review of Systems   All other systems reviewed and are negative.      Objective:    /84   Pulse 65   Ht 167.6 cm (66\")   Wt 77.6 kg (171 lb)   SpO2 95%   BMI 27.60 kg/m²     Neurology Exam:    General apperance: NAD.     Mental status: Alert, awake and oriented to time place and person.  Could tell me the month, year, her daughter's date of birth and her ZIP Code    Recent and Remote memory: Intact.    Attention span and Concentration: Normal.     Language and Speech: Intact- No dysarthria.    Fluency, Naming , Repitition and Comprehension:  Intact    Cranial Nerves:   CN II: Visual fields are full. Intact. Fundi - Normal, No papillederma, Pupils - CRISTAL  CN III, IV and VI: Extraocular movements are intact. Normal saccades.   CN V: Facial sensation is intact.   CN VII: Muscles of facial expression reveal no asymmetry. Intact.   CN VIII: Hearing is intact. Whispered voice intact.   CN IX and X: Palate elevates symmetrically. Intact  CN XI: Shoulder shrug is intact.   CN XII: Tongue is midline without evidence of atrophy or fasciculation.     Ophthalmoscopic exam of optic disc- deferred    Motor:  Right UE muscle strength 5/5. Normal tone.     Left UE muscle strength " 5/5. Normal tone.      Right LE muscle strength5/5. Normal tone.     Left LE muscle strength 5/5. Normal tone.      Sensory: Normal light touch, vibration and pinprick sensation bilaterally.    DTRs: 2+ bilaterally in upper and lower extremities.    Babinski: Negative bilaterally.    Co-ordination: Normal finger-to-nose, heel to shin B/L.    Rhomberg: Negative.    Gait: normal    Cardiovascular: Regular rate and rhythm without murmur, gallop or rub.    Assessment and Plan:  1. Cerebrovascular accident (CVA), unspecified mechanism (CMS/HCC)  Patient had acute infarcts in the left PCA territory along with left P1/P2 occlusion and distal right vertebral occlusion.  Holter monitor showed PACs and PVCs but no A. fib.  Her loop recorder showed paroxysmal atrial fibrillation.  Last loop recorder showed bradycardia.  She now has a pacemaker.    She should continue Eliquis 5 mg twice a day.  Also on Tikosyn  I have told her to increase her Crestor to 10 mg every other day alternating with 5 mg.  Her last LDL was 100  Goal blood pressure less than 130/90.  Blood pressure is well controlled now  Last A1c of 5.8.  I have told her to make dietary modifications  I have once again counseled her to keep herself well-hydrated she complains of mild postural lightheadedness and is currently on multiple blood pressure medications    Of note-she will let me know if she needs a PT referral for her low back pain      Return in about 1 year (around 11/25/2025).       Berna Araiza MD

## 2025-02-03 RX ORDER — DOFETILIDE 0.12 MG/1
125 CAPSULE ORAL EVERY 12 HOURS
Qty: 60 CAPSULE | Refills: 4 | Status: SHIPPED | OUTPATIENT
Start: 2025-02-03

## 2025-02-05 ENCOUNTER — OFFICE VISIT (OUTPATIENT)
Dept: CARDIOLOGY | Facility: CLINIC | Age: 80
End: 2025-02-05
Payer: MEDICARE

## 2025-02-05 VITALS
OXYGEN SATURATION: 96 % | SYSTOLIC BLOOD PRESSURE: 142 MMHG | DIASTOLIC BLOOD PRESSURE: 88 MMHG | BODY MASS INDEX: 27.74 KG/M2 | HEIGHT: 66 IN | HEART RATE: 68 BPM | WEIGHT: 172.6 LBS

## 2025-02-05 DIAGNOSIS — I10 ESSENTIAL HYPERTENSION: ICD-10-CM

## 2025-02-05 DIAGNOSIS — I48.0 PAROXYSMAL ATRIAL FIBRILLATION: Primary | ICD-10-CM

## 2025-02-05 DIAGNOSIS — I49.5 SSS (SICK SINUS SYNDROME): ICD-10-CM

## 2025-02-05 NOTE — PROGRESS NOTES
"Yumi Crooks  1945  [phone]    02/05/2025    [facility] CARDIOLOGY     Referring Provider: No ref. provider found     Sofia Oquendo, APRN  [address]  [facility] [address]    Chief Complaint   Patient presents with    Paroxysmal atrial fibrillation       Problem List:   Sick Sinus Syndrome  Loop recorder interrogation 7/21/2022 demonstrating episodes of sinus bradycardia during waking hours, consistent with symptoms of fatigue.   9/27/22 Placement of [employer] pacemaker dual chamber.  Paroxysmal atrial fibrillation  CHADSVasc = 6  on Eliquis  Diagnosed on loop recorder interrogation April 2021  Previous CVA 2020   Successful ECV 2/17/23  Failed Flecainide  Initiation of Tikosyn March 2023  Echocardiogram 3/27/2024 LVEF 55% moderate dilatation ascending aorta at 4.4 cm, mild TR  Hypertension  Hyperlipidemia  CVA 10/2020  MRI evidence of multiple small infarcts in the left splenium of the corpus callosum and medial left occipital lobe  EEG unremarkable  VIDAL 10/16/2020: EF 61 to 65%, no evidence of left atrial appendage thrombus, saline test negative for right to left atrial level shunt  Loop recorder implant 12/17/2020-later found to have diagnosis of atrial fibrillation April 2021  History of breast cancer status post right mastectomy on Arimidex  Anxiety  Depression  Allergies  Allergies   Allergen Reactions    Lortab [Hydrocodone-Acetaminophen] Other (See Comments)     \"hyperventilate\"    Lipitor [Atorvastatin] Myalgia    Penicillins Unknown - Low Severity     Had too much as a kid    Sulfa Antibiotics Unknown - Low Severity      \"too much as a kid...and, it ruined my teeth\"       Current Medications    Current Outpatient Medications:     anastrozole (ARIMIDEX) 1 MG tablet, , Disp: , Rfl:     apixaban (Eliquis) 5 MG tablet tablet, Take 1 tablet by mouth 2 (Two) Times a Day. First dose Thursday 9/29/2022, Disp: 60 tablet, Rfl: 2    busPIRone (BUSPAR) 5 MG tablet, Take " "1 tablet by mouth 2 (two) times a day., Disp: , Rfl:     carvedilol (COREG) 3.125 MG tablet, Take 1 tablet by mouth 2 (Two) Times a Day., Disp: 180 tablet, Rfl: 1    Coenzyme Q10 (COQ10 PO), Take 1 tablet by mouth Daily., Disp: , Rfl:     colestipol (Colestid) 1 g tablet, Take 1 tablet by mouth Daily., Disp: 30 tablet, Rfl: 5    dofetilide (TIKOSYN) 125 MCG capsule, TAKE 1 CAPSULE BY MOUTH EVERY 12 HOURS, Disp: 60 capsule, Rfl: 4    esomeprazole (nexIUM) 40 MG capsule, TAKE 1 CAPSULE BY MOUTH IN THE MORNING BEFORE BREAKFAST, Disp: 30 capsule, Rfl: 1    losartan (COZAAR) 100 MG tablet, Take 1 tablet by mouth Daily., Disp: , Rfl:     melatonin 5 MG tablet tablet, Take 2 tablets by mouth Every Night., Disp: , Rfl:     Multiple Vitamins-Minerals (multivitamin, eye vitamin,) tablet tablet, Take 1 tablet by mouth Daily., Disp: , Rfl:     pramipexole (MIRAPEX) 0.125 MG tablet, Take 2 tablets by mouth Every Night., Disp: , Rfl:     rosuvastatin (CRESTOR) 10 MG tablet, Take 1 tablet by mouth Daily., Disp: 90 tablet, Rfl: 3    History of Present Illness     Pt presents for follow up of PAF/SSS/HTN/CVA. Since we last saw the pt, pt denies any AF episodes, CP, LH, and dizziness.  Does have chronic dyspnea on exertion which has been unchanged.  Has been having visual loss in the left eye and may have to undergo surgery.  She is asking about whether she can come off of her Eliquis for the surgery.  Denies any hospitalizations, ER visits, bleeding, or TIA/CVA symptoms. Overall feels well.  Blood pressure is much better at home than what is here today      Vitals:    02/05/25 1540   BP: 142/88   BP Location: Right arm   Patient Position: Sitting   Cuff Size: Adult   Pulse: 68   SpO2: 96%   Weight: 78.3 kg (172 lb 9.6 oz)   Height: 167.6 cm (66\")     Body mass index is 27.86 kg/m².  PE:  General: NAD  Neck: no JVD, no carotid bruits, no TM  Heart RRR, NL S1, S2, S4 present, no rubs, murmurs  Lungs: CTA, no wheezes, rhonchi, or " rales  Abd: soft, non-tender, NL BS  Ext: No musculoskeletal deformities, no edema, cyanosis, or clubbing  Psych: normal mood and affect    Diagnostic Data:        ECG 12 Lead    Date/Time: 2/5/2025 3:56 PM  Performed by: Chuck Chavez MD    Authorized by: Chuck Chavez MD  Comparison: compared with previous ECG from 3/13/2024  Similar to previous ECG  Rhythm: sinus rhythm and paced  BPM: 70          [employer] pacemaker.  [employer] dual-chamber pacemaker rate 60.  A paced 98%.  RV paced 5%.  Normal P wave R wave thresholds impedances.  Battery voltage 5.5 years.  Sinus bradycardia.  <1% afib, longest episode 11 hours.          1. Paroxysmal atrial fibrillation    2. SSS (sick sinus syndrome)    3. Essential hypertension          Plan:    1. PAF   -CHADSVasc = 7 on Eliquis  -Diagnosed on loop recorder interrogation April 2021. ECV 2/17/23, failed flecainide therapy  -Continue coreg.   -Initiation of Tikosyn March 2023.  EKG today stable.  Acceptable QTc.       2. Symptomatic SSS:   S/p BSC DC PM, Normal device interrogation today     3. HTN:   Better at home continue to monitor.     4. CVA: Lifelong eliquis     Okay for coming off Eliquis 48 hours prior to the surgery of the left eye if necessary.      F/up in 6 months

## 2025-02-13 ENCOUNTER — TELEPHONE (OUTPATIENT)
Dept: CARDIOLOGY | Facility: CLINIC | Age: 80
End: 2025-02-13
Payer: MEDICARE

## 2025-02-13 NOTE — TELEPHONE ENCOUNTER
" called today stating her heart rates are so low she is dizzy and walking \"around like she is drunk.\" I had her send a manual reading and her heart rates are not low. She was just seen and checked in office on 2/5/2025 and the reading from today is unchanged. in under a tremendous amount of stress taking care of her . He is a diabetic and has lost his hearing and most of his sight and just had surgery on both legs to \"clean them out.\" I recommended she see her PCP. She has an appointment scheduled for 3/17/2025 but I recommended she call to ask for a sooner appointment. She agreed with suggestion.  "

## 2025-05-09 ENCOUNTER — TELEPHONE (OUTPATIENT)
Dept: CARDIOLOGY | Facility: CLINIC | Age: 80
End: 2025-05-09
Payer: MEDICARE

## 2025-05-09 NOTE — TELEPHONE ENCOUNTER
LVM OF APPT DATE, TIME, LOCATION & WHO SHE WILL BE SEEING FOR HER 6 MO FU W RAMAKRISHNA & 1 YR FU W KEVIN. MAILED OUT BOTH APPT REMINDER PAPERS.

## 2025-07-07 RX ORDER — DOFETILIDE 0.12 MG/1
125 CAPSULE ORAL EVERY 12 HOURS SCHEDULED
Qty: 60 CAPSULE | Refills: 0 | Status: SHIPPED | OUTPATIENT
Start: 2025-07-07

## 2025-07-17 LAB
MC_CV_MDC_IDC_RATE_1: 160
MC_CV_MDC_IDC_ZONE_ID: 1
MDC_IDC_MSMT_BATTERY_REMAINING_LONGEVITY: 66 MO
MDC_IDC_MSMT_BATTERY_REMAINING_PERCENTAGE: 100 %
MDC_IDC_MSMT_BATTERY_STATUS: NORMAL
MDC_IDC_MSMT_LEADCHNL_RA_DTM: NORMAL
MDC_IDC_MSMT_LEADCHNL_RA_IMPEDANCE_VALUE: 558
MDC_IDC_MSMT_LEADCHNL_RA_PACING_THRESHOLD_POLARITY: NORMAL
MDC_IDC_MSMT_LEADCHNL_RV_DTM: NORMAL
MDC_IDC_MSMT_LEADCHNL_RV_IMPEDANCE_VALUE: 616
MDC_IDC_MSMT_LEADCHNL_RV_PACING_THRESHOLD_POLARITY: NORMAL
MDC_IDC_MSMT_LEADCHNL_RV_SENSING_INTR_AMPL: 6.7
MDC_IDC_PG_IMPLANT_DTM: NORMAL
MDC_IDC_PG_MFG: NORMAL
MDC_IDC_PG_MODEL: NORMAL
MDC_IDC_PG_SERIAL: NORMAL
MDC_IDC_PG_TYPE: NORMAL
MDC_IDC_SESS_DTM: NORMAL
MDC_IDC_SESS_TYPE: NORMAL
MDC_IDC_SET_BRADY_AT_MODE_SWITCH_RATE: 170
MDC_IDC_SET_BRADY_LOWRATE: 60
MDC_IDC_SET_BRADY_MAX_SENSOR_RATE: 120
MDC_IDC_SET_BRADY_MAX_TRACKING_RATE: 120
MDC_IDC_SET_BRADY_MODE: NORMAL
MDC_IDC_SET_BRADY_PAV_DELAY: 220
MDC_IDC_SET_BRADY_SAV_DELAY: 220
MDC_IDC_SET_LEADCHNL_RA_PACING_AMPLITUDE: 2.6
MDC_IDC_SET_LEADCHNL_RA_PACING_POLARITY: NORMAL
MDC_IDC_SET_LEADCHNL_RA_PACING_PULSEWIDTH: 0.5
MDC_IDC_SET_LEADCHNL_RA_SENSING_POLARITY: NORMAL
MDC_IDC_SET_LEADCHNL_RA_SENSING_SENSITIVITY: 0.15
MDC_IDC_SET_LEADCHNL_RV_PACING_AMPLITUDE: 3
MDC_IDC_SET_LEADCHNL_RV_PACING_POLARITY: NORMAL
MDC_IDC_SET_LEADCHNL_RV_PACING_PULSEWIDTH: 0.5
MDC_IDC_SET_LEADCHNL_RV_SENSING_POLARITY: NORMAL
MDC_IDC_SET_LEADCHNL_RV_SENSING_SENSITIVITY: 0.6
MDC_IDC_SET_ZONE_STATUS: NORMAL
MDC_IDC_SET_ZONE_TYPE: NORMAL
MDC_IDC_STAT_AT_BURDEN_PERCENT: 1
MDC_IDC_STAT_BRADY_RA_PERCENT_PACED: 98
MDC_IDC_STAT_BRADY_RV_PERCENT_PACED: 4

## 2025-08-04 RX ORDER — DOFETILIDE 0.12 MG/1
125 CAPSULE ORAL EVERY 12 HOURS SCHEDULED
Qty: 60 CAPSULE | Refills: 6 | Status: SHIPPED | OUTPATIENT
Start: 2025-08-04

## 2025-08-06 ENCOUNTER — OFFICE VISIT (OUTPATIENT)
Dept: CARDIOLOGY | Facility: CLINIC | Age: 80
End: 2025-08-06
Payer: MEDICARE

## 2025-08-06 VITALS
BODY MASS INDEX: 26.39 KG/M2 | HEART RATE: 61 BPM | DIASTOLIC BLOOD PRESSURE: 76 MMHG | OXYGEN SATURATION: 95 % | HEIGHT: 66 IN | WEIGHT: 164.2 LBS | SYSTOLIC BLOOD PRESSURE: 116 MMHG

## 2025-08-06 DIAGNOSIS — I49.5 SSS (SICK SINUS SYNDROME): ICD-10-CM

## 2025-08-06 DIAGNOSIS — I10 ESSENTIAL HYPERTENSION: ICD-10-CM

## 2025-08-06 DIAGNOSIS — Z86.73 HISTORY OF CVA (CEREBROVASCULAR ACCIDENT): ICD-10-CM

## 2025-08-06 DIAGNOSIS — I48.0 PAROXYSMAL ATRIAL FIBRILLATION: Primary | ICD-10-CM

## 2025-08-06 RX ORDER — AMLODIPINE BESYLATE 10 MG/1
5 TABLET ORAL
COMMUNITY
Start: 2025-05-28

## (undated) DEVICE — SOL NACL 0.9PCT 1000ML

## (undated) DEVICE — CANN NASL CO2 DIVIDED A/

## (undated) DEVICE — ST INF PRI SMRTSTE 20DRP 2VLV 24ML 117

## (undated) DEVICE — 3M™ STERI-STRIP™ REINFORCED ADHESIVE SKIN CLOSURES, R1547, 1/2 IN X 4 IN (12 MM X 100 MM), 6 STRIPS/ENVELOPE: Brand: 3M™ STERI-STRIP™

## (undated) DEVICE — ADULT, W/LG. BACK PAD, RADIOTRANSPARENT ELEMENT AND LEAD WIRE: Brand: DEFIBRILLATION ELECTRODES

## (undated) DEVICE — ST EXT IV SMARTSITE 2VLV SP M LL 5ML IV1

## (undated) DEVICE — MEDI-VAC YANKAUER SUCTION HANDLE W/BULBOUS TIP: Brand: CARDINAL HEALTH

## (undated) DEVICE — DRSNG SURESITE123 4X4.8IN

## (undated) DEVICE — TBG PENCL TELESCP MEGADYNE SMOKE EVAC 10FT

## (undated) DEVICE — INTRO TEAR AWAY/LVD W/SD PRT 7F 13CM

## (undated) DEVICE — TRAP FLD MINIVAC MEGADYNE 100ML

## (undated) DEVICE — LIMB HOLDER, WRIST/ANKLE: Brand: DEROYAL

## (undated) DEVICE — IRRIGATOR BULB ASEPTO 60CC STRL

## (undated) DEVICE — DECANT BG O JET

## (undated) DEVICE — DEBRIDEMENT KIT: Brand: MEDLINE INDUSTRIES, INC.

## (undated) DEVICE — CAUTERY TIP POLISHER: Brand: DEVON

## (undated) DEVICE — ELECTRD RETRN/GRND MEGADYNE SGL/PLT W/CORD 9FT DISP

## (undated) DEVICE — SET PRIMARY GRVTY 10DP MALE LL 104IN

## (undated) DEVICE — TUBING, SUCTION, 1/4" X 10', STRAIGHT: Brand: MEDLINE

## (undated) DEVICE — LEX ELECTRO PHYSIOLOGY: Brand: MEDLINE INDUSTRIES, INC.